# Patient Record
Sex: FEMALE | Race: WHITE | NOT HISPANIC OR LATINO | Employment: UNEMPLOYED | URBAN - METROPOLITAN AREA
[De-identification: names, ages, dates, MRNs, and addresses within clinical notes are randomized per-mention and may not be internally consistent; named-entity substitution may affect disease eponyms.]

---

## 2017-01-27 ENCOUNTER — CONVERSION ENCOUNTER (OUTPATIENT)
Dept: MAMMOGRAPHY | Facility: HOSPITAL | Age: 47
End: 2017-01-27

## 2018-06-06 ENCOUNTER — OFFICE VISIT CONVERTED (OUTPATIENT)
Dept: FAMILY MEDICINE CLINIC | Facility: CLINIC | Age: 48
End: 2018-06-06
Attending: NURSE PRACTITIONER

## 2018-10-17 ENCOUNTER — OFFICE VISIT CONVERTED (OUTPATIENT)
Dept: FAMILY MEDICINE CLINIC | Facility: CLINIC | Age: 48
End: 2018-10-17
Attending: NURSE PRACTITIONER

## 2018-10-24 ENCOUNTER — CONVERSION ENCOUNTER (OUTPATIENT)
Dept: GENERAL RADIOLOGY | Facility: HOSPITAL | Age: 48
End: 2018-10-24

## 2018-10-24 ENCOUNTER — OFFICE VISIT CONVERTED (OUTPATIENT)
Dept: FAMILY MEDICINE CLINIC | Facility: CLINIC | Age: 48
End: 2018-10-24
Attending: NURSE PRACTITIONER

## 2018-10-24 ENCOUNTER — OFFICE VISIT CONVERTED (OUTPATIENT)
Dept: UROLOGY | Facility: CLINIC | Age: 48
End: 2018-10-24
Attending: UROLOGY

## 2018-11-07 ENCOUNTER — OFFICE VISIT CONVERTED (OUTPATIENT)
Dept: FAMILY MEDICINE CLINIC | Facility: CLINIC | Age: 48
End: 2018-11-07
Attending: NURSE PRACTITIONER

## 2018-12-04 ENCOUNTER — OFFICE VISIT CONVERTED (OUTPATIENT)
Dept: FAMILY MEDICINE CLINIC | Facility: CLINIC | Age: 48
End: 2018-12-04
Attending: NURSE PRACTITIONER

## 2018-12-10 ENCOUNTER — CONVERSION ENCOUNTER (OUTPATIENT)
Dept: ULTRASOUND IMAGING | Facility: HOSPITAL | Age: 48
End: 2018-12-10

## 2019-01-03 ENCOUNTER — CONVERSION ENCOUNTER (OUTPATIENT)
Dept: SURGERY | Facility: CLINIC | Age: 49
End: 2019-01-03

## 2019-01-03 ENCOUNTER — OFFICE VISIT CONVERTED (OUTPATIENT)
Dept: UROLOGY | Facility: CLINIC | Age: 49
End: 2019-01-03
Attending: UROLOGY

## 2019-01-04 ENCOUNTER — HOSPITAL ENCOUNTER (OUTPATIENT)
Dept: FAMILY MEDICINE CLINIC | Facility: CLINIC | Age: 49
Discharge: HOME | End: 2019-01-04
Attending: NURSE PRACTITIONER

## 2019-01-04 ENCOUNTER — OFFICE VISIT CONVERTED (OUTPATIENT)
Dept: FAMILY MEDICINE CLINIC | Facility: CLINIC | Age: 49
End: 2019-01-04
Attending: NURSE PRACTITIONER

## 2019-01-04 LAB
ALBUMIN SERPL-MCNC: 4.2 G/DL (ref 3.5–5)
ALBUMIN/GLOB SERPL: 1.4 {RATIO} (ref 1.4–2.6)
ALP SERPL-CCNC: 125 U/L (ref 42–98)
ALT SERPL-CCNC: 31 U/L (ref 10–40)
ANION GAP SERPL CALC-SCNC: 19 MMOL/L (ref 8–19)
AST SERPL-CCNC: 27 U/L (ref 15–50)
BILIRUB SERPL-MCNC: 0.26 MG/DL (ref 0.2–1.3)
BUN SERPL-MCNC: 13 MG/DL (ref 5–25)
BUN/CREAT SERPL: 21 {RATIO} (ref 6–20)
CALCIUM SERPL-MCNC: 9 MG/DL (ref 8.7–10.4)
CHLORIDE SERPL-SCNC: 101 MMOL/L (ref 99–111)
CHOLEST SERPL-MCNC: 222 MG/DL (ref 107–200)
CHOLEST/HDLC SERPL: 6.3 {RATIO} (ref 3–6)
CONV CO2: 25 MMOL/L (ref 22–32)
CONV TOTAL PROTEIN: 7.2 G/DL (ref 6.3–8.2)
CREAT UR-MCNC: 0.63 MG/DL (ref 0.5–0.9)
GFR SERPLBLD BASED ON 1.73 SQ M-ARVRAT: >60 ML/MIN/{1.73_M2}
GLOBULIN UR ELPH-MCNC: 3 G/DL (ref 2–3.5)
GLUCOSE SERPL-MCNC: 122 MG/DL (ref 65–99)
HDLC SERPL-MCNC: 35 MG/DL (ref 40–60)
LDLC SERPL CALC-MCNC: 114 MG/DL (ref 70–100)
OSMOLALITY SERPL CALC.SUM OF ELEC: 293 MOSM/KG (ref 273–304)
POTASSIUM SERPL-SCNC: 3.9 MMOL/L (ref 3.5–5.3)
SODIUM SERPL-SCNC: 141 MMOL/L (ref 135–147)
T4 FREE SERPL-MCNC: 1.1 NG/DL (ref 0.9–1.8)
TRIGL SERPL-MCNC: 364 MG/DL (ref 40–150)
TSH SERPL-ACNC: 2.85 M[IU]/L (ref 0.27–4.2)
VLDLC SERPL-MCNC: 73 MG/DL (ref 5–37)

## 2019-01-14 ENCOUNTER — HOSPITAL ENCOUNTER (OUTPATIENT)
Dept: SLEEP MEDICINE | Facility: HOSPITAL | Age: 49
Discharge: HOME | End: 2019-01-14
Attending: INTERNAL MEDICINE

## 2019-01-18 ENCOUNTER — HOSPITAL ENCOUNTER (OUTPATIENT)
Dept: FAMILY MEDICINE CLINIC | Facility: CLINIC | Age: 49
Discharge: HOME | End: 2019-01-18
Attending: NURSE PRACTITIONER

## 2019-01-18 LAB
ALBUMIN SERPL-MCNC: 4.1 G/DL (ref 3.5–5)
ALBUMIN/GLOB SERPL: 1.2 {RATIO} (ref 1.4–2.6)
ALP SERPL-CCNC: 116 U/L (ref 42–98)
ALT SERPL-CCNC: 42 U/L (ref 10–40)
ANION GAP SERPL CALC-SCNC: 18 MMOL/L (ref 8–19)
AST SERPL-CCNC: 42 U/L (ref 15–50)
BILIRUB SERPL-MCNC: 0.31 MG/DL (ref 0.2–1.3)
BUN SERPL-MCNC: 13 MG/DL (ref 5–25)
BUN/CREAT SERPL: 20 {RATIO} (ref 6–20)
CALCIUM SERPL-MCNC: 9.2 MG/DL (ref 8.7–10.4)
CHLORIDE SERPL-SCNC: 99 MMOL/L (ref 99–111)
CONV CO2: 27 MMOL/L (ref 22–32)
CONV TOTAL PROTEIN: 7.6 G/DL (ref 6.3–8.2)
CREAT UR-MCNC: 0.66 MG/DL (ref 0.5–0.9)
GFR SERPLBLD BASED ON 1.73 SQ M-ARVRAT: >60 ML/MIN/{1.73_M2}
GLOBULIN UR ELPH-MCNC: 3.5 G/DL (ref 2–3.5)
GLUCOSE SERPL-MCNC: 143 MG/DL (ref 65–99)
OSMOLALITY SERPL CALC.SUM OF ELEC: 293 MOSM/KG (ref 273–304)
POTASSIUM SERPL-SCNC: 3.8 MMOL/L (ref 3.5–5.3)
SODIUM SERPL-SCNC: 140 MMOL/L (ref 135–147)

## 2019-01-20 LAB — BACTERIA UR CULT: NORMAL

## 2019-01-29 ENCOUNTER — HOSPITAL ENCOUNTER (OUTPATIENT)
Dept: SLEEP MEDICINE | Facility: HOSPITAL | Age: 49
Discharge: HOME | End: 2019-01-29
Attending: INTERNAL MEDICINE

## 2019-02-01 ENCOUNTER — PROCEDURE VISIT CONVERTED (OUTPATIENT)
Dept: UROLOGY | Facility: CLINIC | Age: 49
End: 2019-02-01
Attending: UROLOGY

## 2019-02-05 ENCOUNTER — CONVERSION ENCOUNTER (OUTPATIENT)
Dept: CARDIOLOGY | Facility: CLINIC | Age: 49
End: 2019-02-05

## 2019-02-05 ENCOUNTER — OFFICE VISIT CONVERTED (OUTPATIENT)
Dept: CARDIOLOGY | Facility: CLINIC | Age: 49
End: 2019-02-05
Attending: SPECIALIST

## 2019-02-20 ENCOUNTER — CONVERSION ENCOUNTER (OUTPATIENT)
Dept: SURGERY | Facility: CLINIC | Age: 49
End: 2019-02-20

## 2019-02-20 ENCOUNTER — OFFICE VISIT CONVERTED (OUTPATIENT)
Dept: UROLOGY | Facility: CLINIC | Age: 49
End: 2019-02-20
Attending: UROLOGY

## 2019-02-28 ENCOUNTER — OFFICE VISIT CONVERTED (OUTPATIENT)
Dept: CARDIOLOGY | Facility: CLINIC | Age: 49
End: 2019-02-28
Attending: SPECIALIST

## 2019-05-14 ENCOUNTER — HOSPITAL ENCOUNTER (OUTPATIENT)
Dept: SLEEP MEDICINE | Facility: HOSPITAL | Age: 49
Discharge: HOME | End: 2019-05-14
Attending: PSYCHIATRY & NEUROLOGY

## 2019-05-24 ENCOUNTER — CONVERSION ENCOUNTER (OUTPATIENT)
Dept: CARDIOLOGY | Facility: CLINIC | Age: 49
End: 2019-05-24
Attending: SPECIALIST

## 2019-08-05 ENCOUNTER — OFFICE VISIT CONVERTED (OUTPATIENT)
Dept: FAMILY MEDICINE CLINIC | Facility: CLINIC | Age: 49
End: 2019-08-05
Attending: NURSE PRACTITIONER

## 2019-08-05 ENCOUNTER — HOSPITAL ENCOUNTER (OUTPATIENT)
Dept: FAMILY MEDICINE CLINIC | Facility: CLINIC | Age: 49
Discharge: HOME | End: 2019-08-05
Attending: NURSE PRACTITIONER

## 2019-08-05 LAB
ALBUMIN SERPL-MCNC: 4.3 G/DL (ref 3.5–5)
ALBUMIN/GLOB SERPL: 1.5 {RATIO} (ref 1.4–2.6)
ALP SERPL-CCNC: 131 U/L (ref 42–98)
ALT SERPL-CCNC: 23 U/L (ref 10–40)
ANION GAP SERPL CALC-SCNC: 18 MMOL/L (ref 8–19)
AST SERPL-CCNC: 18 U/L (ref 15–50)
BASOPHILS # BLD AUTO: 0.04 10*3/UL (ref 0–0.2)
BASOPHILS NFR BLD AUTO: 0.7 % (ref 0–3)
BILIRUB SERPL-MCNC: 0.26 MG/DL (ref 0.2–1.3)
BUN SERPL-MCNC: 12 MG/DL (ref 5–25)
BUN/CREAT SERPL: 20 {RATIO} (ref 6–20)
CALCIUM SERPL-MCNC: 9 MG/DL (ref 8.7–10.4)
CHLORIDE SERPL-SCNC: 102 MMOL/L (ref 99–111)
CONV ABS IMM GRAN: 0.04 10*3/UL (ref 0–0.2)
CONV CO2: 25 MMOL/L (ref 22–32)
CONV IMMATURE GRAN: 0.7 % (ref 0–1.8)
CONV TOTAL PROTEIN: 7.2 G/DL (ref 6.3–8.2)
CREAT UR-MCNC: 0.6 MG/DL (ref 0.5–0.9)
DEPRECATED RDW RBC AUTO: 43.5 FL (ref 36.4–46.3)
EOSINOPHIL # BLD AUTO: 0.13 10*3/UL (ref 0–0.7)
EOSINOPHIL # BLD AUTO: 2.3 % (ref 0–7)
ERYTHROCYTE [DISTWIDTH] IN BLOOD BY AUTOMATED COUNT: 14.3 % (ref 11.7–14.4)
FOLATE SERPL-MCNC: 9.6 NG/ML (ref 4.8–20)
GFR SERPLBLD BASED ON 1.73 SQ M-ARVRAT: >60 ML/MIN/{1.73_M2}
GLOBULIN UR ELPH-MCNC: 2.9 G/DL (ref 2–3.5)
GLUCOSE SERPL-MCNC: 134 MG/DL (ref 65–99)
HCT VFR BLD AUTO: 42.2 % (ref 37–47)
HGB BLD-MCNC: 13.8 G/DL (ref 12–16)
IRON SATN MFR SERPL: 14 % (ref 20–55)
IRON SERPL-MCNC: 54 UG/DL (ref 60–170)
LYMPHOCYTES # BLD AUTO: 1.52 10*3/UL (ref 1–5)
LYMPHOCYTES NFR BLD AUTO: 27.3 % (ref 20–45)
MCH RBC QN AUTO: 27.7 PG (ref 27–31)
MCHC RBC AUTO-ENTMCNC: 32.7 G/DL (ref 33–37)
MCV RBC AUTO: 84.7 FL (ref 81–99)
MONOCYTES # BLD AUTO: 0.85 10*3/UL (ref 0.2–1.2)
MONOCYTES NFR BLD AUTO: 15.3 % (ref 3–10)
NEUTROPHILS # BLD AUTO: 2.98 10*3/UL (ref 2–8)
NEUTROPHILS NFR BLD AUTO: 53.7 % (ref 30–85)
NRBC CBCN: 0 % (ref 0–0.7)
OSMOLALITY SERPL CALC.SUM OF ELEC: 294 MOSM/KG (ref 273–304)
PLATELET # BLD AUTO: 244 10*3/UL (ref 130–400)
PMV BLD AUTO: 9.9 FL (ref 9.4–12.3)
POTASSIUM SERPL-SCNC: 3.8 MMOL/L (ref 3.5–5.3)
RBC # BLD AUTO: 4.98 10*6/UL (ref 4.2–5.4)
SODIUM SERPL-SCNC: 141 MMOL/L (ref 135–147)
T4 FREE SERPL-MCNC: 0.9 NG/DL (ref 0.9–1.8)
TIBC SERPL-MCNC: 386 UG/DL (ref 245–450)
TRANSFERRIN SERPL-MCNC: 270 MG/DL (ref 250–380)
TSH SERPL-ACNC: 4.45 M[IU]/L (ref 0.27–4.2)
VIT B12 SERPL-MCNC: 489 PG/ML (ref 211–911)
WBC # BLD AUTO: 5.56 10*3/UL (ref 4.8–10.8)

## 2019-08-28 ENCOUNTER — OFFICE VISIT CONVERTED (OUTPATIENT)
Dept: SURGERY | Facility: CLINIC | Age: 49
End: 2019-08-28
Attending: NURSE PRACTITIONER

## 2019-10-21 ENCOUNTER — HOSPITAL ENCOUNTER (OUTPATIENT)
Dept: GASTROENTEROLOGY | Facility: HOSPITAL | Age: 49
Setting detail: HOSPITAL OUTPATIENT SURGERY
Discharge: HOME | End: 2019-10-21
Attending: SURGERY

## 2019-11-04 ENCOUNTER — OFFICE VISIT CONVERTED (OUTPATIENT)
Dept: SURGERY | Facility: CLINIC | Age: 49
End: 2019-11-04
Attending: NURSE PRACTITIONER

## 2019-11-08 ENCOUNTER — PROCEDURE VISIT CONVERTED (OUTPATIENT)
Dept: UROLOGY | Facility: CLINIC | Age: 49
End: 2019-11-08
Attending: UROLOGY

## 2019-11-21 ENCOUNTER — OFFICE VISIT CONVERTED (OUTPATIENT)
Dept: FAMILY MEDICINE CLINIC | Facility: CLINIC | Age: 49
End: 2019-11-21
Attending: NURSE PRACTITIONER

## 2019-11-21 ENCOUNTER — HOSPITAL ENCOUNTER (OUTPATIENT)
Dept: FAMILY MEDICINE CLINIC | Facility: CLINIC | Age: 49
Discharge: HOME | End: 2019-11-21
Attending: NURSE PRACTITIONER

## 2019-11-21 LAB
25(OH)D3 SERPL-MCNC: 38.3 NG/ML (ref 30–100)
ALBUMIN SERPL-MCNC: 4.3 G/DL (ref 3.5–5)
ALBUMIN/GLOB SERPL: 1.4 {RATIO} (ref 1.4–2.6)
ALP SERPL-CCNC: 123 U/L (ref 42–98)
ALT SERPL-CCNC: 26 U/L (ref 10–40)
ANION GAP SERPL CALC-SCNC: 18 MMOL/L (ref 8–19)
AST SERPL-CCNC: 26 U/L (ref 15–50)
BASOPHILS # BLD AUTO: 0.03 10*3/UL (ref 0–0.2)
BASOPHILS NFR BLD AUTO: 0.5 % (ref 0–3)
BILIRUB SERPL-MCNC: 0.39 MG/DL (ref 0.2–1.3)
BUN SERPL-MCNC: 14 MG/DL (ref 5–25)
BUN/CREAT SERPL: 23 {RATIO} (ref 6–20)
CALCIUM SERPL-MCNC: 9.7 MG/DL (ref 8.7–10.4)
CHLORIDE SERPL-SCNC: 99 MMOL/L (ref 99–111)
CHOLEST SERPL-MCNC: 222 MG/DL (ref 107–200)
CHOLEST/HDLC SERPL: 5.6 {RATIO} (ref 3–6)
CONV ABS IMM GRAN: 0.03 10*3/UL (ref 0–0.2)
CONV CO2: 24 MMOL/L (ref 22–32)
CONV IMMATURE GRAN: 0.5 % (ref 0–1.8)
CONV TOTAL PROTEIN: 7.3 G/DL (ref 6.3–8.2)
CREAT UR-MCNC: 0.6 MG/DL (ref 0.5–0.9)
DEPRECATED RDW RBC AUTO: 44.2 FL (ref 36.4–46.3)
EOSINOPHIL # BLD AUTO: 0.16 10*3/UL (ref 0–0.7)
EOSINOPHIL # BLD AUTO: 2.9 % (ref 0–7)
ERYTHROCYTE [DISTWIDTH] IN BLOOD BY AUTOMATED COUNT: 14.3 % (ref 11.7–14.4)
EST. AVERAGE GLUCOSE BLD GHB EST-MCNC: 157 MG/DL
GFR SERPLBLD BASED ON 1.73 SQ M-ARVRAT: >60 ML/MIN/{1.73_M2}
GLOBULIN UR ELPH-MCNC: 3 G/DL (ref 2–3.5)
GLUCOSE SERPL-MCNC: 122 MG/DL (ref 65–99)
HBA1C MFR BLD: 7.1 % (ref 3.5–5.7)
HCT VFR BLD AUTO: 42.3 % (ref 37–47)
HDLC SERPL-MCNC: 40 MG/DL (ref 40–60)
HGB BLD-MCNC: 13.9 G/DL (ref 12–16)
LDLC SERPL CALC-MCNC: 144 MG/DL (ref 70–100)
LYMPHOCYTES # BLD AUTO: 2.14 10*3/UL (ref 1–5)
LYMPHOCYTES NFR BLD AUTO: 38.1 % (ref 20–45)
MCH RBC QN AUTO: 28 PG (ref 27–31)
MCHC RBC AUTO-ENTMCNC: 32.9 G/DL (ref 33–37)
MCV RBC AUTO: 85.1 FL (ref 81–99)
MONOCYTES # BLD AUTO: 0.57 10*3/UL (ref 0.2–1.2)
MONOCYTES NFR BLD AUTO: 10.2 % (ref 3–10)
NEUTROPHILS # BLD AUTO: 2.68 10*3/UL (ref 2–8)
NEUTROPHILS NFR BLD AUTO: 47.8 % (ref 30–85)
NRBC CBCN: 0 % (ref 0–0.7)
OSMOLALITY SERPL CALC.SUM OF ELEC: 288 MOSM/KG (ref 273–304)
PLATELET # BLD AUTO: 260 10*3/UL (ref 130–400)
PMV BLD AUTO: 10 FL (ref 9.4–12.3)
POTASSIUM SERPL-SCNC: 3.4 MMOL/L (ref 3.5–5.3)
RBC # BLD AUTO: 4.97 10*6/UL (ref 4.2–5.4)
SODIUM SERPL-SCNC: 138 MMOL/L (ref 135–147)
T4 FREE SERPL-MCNC: 1.2 NG/DL (ref 0.9–1.8)
TRIGL SERPL-MCNC: 190 MG/DL (ref 40–150)
TSH SERPL-ACNC: 1.85 M[IU]/L (ref 0.27–4.2)
VLDLC SERPL-MCNC: 38 MG/DL (ref 5–37)
WBC # BLD AUTO: 5.61 10*3/UL (ref 4.8–10.8)

## 2019-11-23 LAB — CONV ANTI MICROSOMAL AB: 12 IU/ML (ref 0–34)

## 2020-01-29 ENCOUNTER — HOSPITAL ENCOUNTER (OUTPATIENT)
Dept: GENERAL RADIOLOGY | Facility: HOSPITAL | Age: 50
Discharge: HOME | End: 2020-01-29
Attending: NURSE PRACTITIONER

## 2020-02-25 ENCOUNTER — CONVERSION ENCOUNTER (OUTPATIENT)
Dept: FAMILY MEDICINE CLINIC | Facility: CLINIC | Age: 50
End: 2020-02-25

## 2020-02-25 ENCOUNTER — OFFICE VISIT CONVERTED (OUTPATIENT)
Dept: FAMILY MEDICINE CLINIC | Facility: CLINIC | Age: 50
End: 2020-02-25
Attending: NURSE PRACTITIONER

## 2020-04-13 ENCOUNTER — TELEMEDICINE CONVERTED (OUTPATIENT)
Dept: PLASTIC SURGERY | Facility: CLINIC | Age: 50
End: 2020-04-13
Attending: PLASTIC SURGERY

## 2020-06-08 ENCOUNTER — TELEMEDICINE CONVERTED (OUTPATIENT)
Dept: FAMILY MEDICINE CLINIC | Facility: CLINIC | Age: 50
End: 2020-06-08
Attending: NURSE PRACTITIONER

## 2020-06-16 ENCOUNTER — HOSPITAL ENCOUNTER (OUTPATIENT)
Dept: FAMILY MEDICINE CLINIC | Facility: CLINIC | Age: 50
Discharge: HOME | End: 2020-06-16
Attending: NURSE PRACTITIONER

## 2020-06-16 ENCOUNTER — CONVERSION ENCOUNTER (OUTPATIENT)
Dept: FAMILY MEDICINE CLINIC | Facility: CLINIC | Age: 50
End: 2020-06-16

## 2020-06-16 ENCOUNTER — OFFICE VISIT CONVERTED (OUTPATIENT)
Dept: FAMILY MEDICINE CLINIC | Facility: CLINIC | Age: 50
End: 2020-06-16
Attending: NURSE PRACTITIONER

## 2020-06-16 LAB
25(OH)D3 SERPL-MCNC: 38.5 NG/ML (ref 30–100)
ALBUMIN SERPL-MCNC: 4.3 G/DL (ref 3.5–5)
ALBUMIN/GLOB SERPL: 1.3 {RATIO} (ref 1.4–2.6)
ALP SERPL-CCNC: 112 U/L (ref 42–98)
ALT SERPL-CCNC: 34 U/L (ref 10–40)
ANION GAP SERPL CALC-SCNC: 18 MMOL/L (ref 8–19)
AST SERPL-CCNC: 33 U/L (ref 15–50)
BASOPHILS # BLD AUTO: 0.06 10*3/UL (ref 0–0.2)
BASOPHILS NFR BLD AUTO: 0.9 % (ref 0–3)
BILIRUB SERPL-MCNC: 0.35 MG/DL (ref 0.2–1.3)
BUN SERPL-MCNC: 14 MG/DL (ref 5–25)
BUN/CREAT SERPL: 20 {RATIO} (ref 6–20)
CALCIUM SERPL-MCNC: 9.3 MG/DL (ref 8.7–10.4)
CHLORIDE SERPL-SCNC: 102 MMOL/L (ref 99–111)
CHOLEST SERPL-MCNC: 250 MG/DL (ref 107–200)
CHOLEST/HDLC SERPL: 6.8 {RATIO} (ref 3–6)
CONV ABS IMM GRAN: 0.03 10*3/UL (ref 0–0.2)
CONV CO2: 23 MMOL/L (ref 22–32)
CONV IMMATURE GRAN: 0.5 % (ref 0–1.8)
CONV TOTAL PROTEIN: 7.6 G/DL (ref 6.3–8.2)
CREAT UR-MCNC: 0.71 MG/DL (ref 0.5–0.9)
DEPRECATED RDW RBC AUTO: 44.9 FL (ref 36.4–46.3)
EOSINOPHIL # BLD AUTO: 0.16 10*3/UL (ref 0–0.7)
EOSINOPHIL # BLD AUTO: 2.4 % (ref 0–7)
ERYTHROCYTE [DISTWIDTH] IN BLOOD BY AUTOMATED COUNT: 14.1 % (ref 11.7–14.4)
EST. AVERAGE GLUCOSE BLD GHB EST-MCNC: 183 MG/DL
FOLATE SERPL-MCNC: 15.8 NG/ML (ref 4.8–20)
GFR SERPLBLD BASED ON 1.73 SQ M-ARVRAT: >60 ML/MIN/{1.73_M2}
GLOBULIN UR ELPH-MCNC: 3.3 G/DL (ref 2–3.5)
GLUCOSE SERPL-MCNC: 134 MG/DL (ref 65–99)
HBA1C MFR BLD: 8 % (ref 3.5–5.7)
HCT VFR BLD AUTO: 46.6 % (ref 37–47)
HDLC SERPL-MCNC: 37 MG/DL (ref 40–60)
HGB BLD-MCNC: 14.9 G/DL (ref 12–16)
IRON SATN MFR SERPL: 15 % (ref 20–55)
IRON SERPL-MCNC: 67 UG/DL (ref 60–170)
LDLC SERPL CALC-MCNC: 161 MG/DL (ref 70–100)
LYMPHOCYTES # BLD AUTO: 2.36 10*3/UL (ref 1–5)
LYMPHOCYTES NFR BLD AUTO: 35.6 % (ref 20–45)
MCH RBC QN AUTO: 27.8 PG (ref 27–31)
MCHC RBC AUTO-ENTMCNC: 32 G/DL (ref 33–37)
MCV RBC AUTO: 86.9 FL (ref 81–99)
MONOCYTES # BLD AUTO: 0.65 10*3/UL (ref 0.2–1.2)
MONOCYTES NFR BLD AUTO: 9.8 % (ref 3–10)
NEUTROPHILS # BLD AUTO: 3.37 10*3/UL (ref 2–8)
NEUTROPHILS NFR BLD AUTO: 50.8 % (ref 30–85)
NRBC CBCN: 0 % (ref 0–0.7)
OSMOLALITY SERPL CALC.SUM OF ELEC: 290 MOSM/KG (ref 273–304)
PLATELET # BLD AUTO: 262 10*3/UL (ref 130–400)
PMV BLD AUTO: 10.8 FL (ref 9.4–12.3)
POTASSIUM SERPL-SCNC: 3.7 MMOL/L (ref 3.5–5.3)
RBC # BLD AUTO: 5.36 10*6/UL (ref 4.2–5.4)
SODIUM SERPL-SCNC: 139 MMOL/L (ref 135–147)
TIBC SERPL-MCNC: 449 UG/DL (ref 245–450)
TRANSFERRIN SERPL-MCNC: 314 MG/DL (ref 250–380)
TRIGL SERPL-MCNC: 261 MG/DL (ref 40–150)
TSH SERPL-ACNC: 2.79 M[IU]/L (ref 0.27–4.2)
VIT B12 SERPL-MCNC: 1124 PG/ML (ref 211–911)
VLDLC SERPL-MCNC: 52 MG/DL (ref 5–37)
WBC # BLD AUTO: 6.63 10*3/UL (ref 4.8–10.8)

## 2020-06-18 LAB — BACTERIA UR CULT: NORMAL

## 2020-12-03 ENCOUNTER — CONVERSION ENCOUNTER (OUTPATIENT)
Dept: FAMILY MEDICINE CLINIC | Facility: CLINIC | Age: 50
End: 2020-12-03

## 2020-12-03 ENCOUNTER — TELEMEDICINE CONVERTED (OUTPATIENT)
Dept: FAMILY MEDICINE CLINIC | Facility: CLINIC | Age: 50
End: 2020-12-03
Attending: NURSE PRACTITIONER

## 2020-12-14 ENCOUNTER — HOSPITAL ENCOUNTER (OUTPATIENT)
Dept: OTHER | Facility: HOSPITAL | Age: 50
Discharge: HOME | End: 2020-12-14
Attending: NURSE PRACTITIONER

## 2020-12-14 LAB
25(OH)D3 SERPL-MCNC: 32.5 NG/ML (ref 30–100)
ALBUMIN SERPL-MCNC: 4.3 G/DL (ref 3.5–5)
ALBUMIN/GLOB SERPL: 1.3 {RATIO} (ref 1.4–2.6)
ALP SERPL-CCNC: 107 U/L (ref 53–141)
ALT SERPL-CCNC: 33 U/L (ref 10–40)
ANION GAP SERPL CALC-SCNC: 16 MMOL/L (ref 8–19)
AST SERPL-CCNC: 21 U/L (ref 15–50)
BASOPHILS # BLD AUTO: 0.07 10*3/UL (ref 0–0.2)
BASOPHILS NFR BLD AUTO: 0.8 % (ref 0–3)
BILIRUB SERPL-MCNC: 0.29 MG/DL (ref 0.2–1.3)
BUN SERPL-MCNC: 13 MG/DL (ref 5–25)
BUN/CREAT SERPL: 19 {RATIO} (ref 6–20)
CALCIUM SERPL-MCNC: 9.5 MG/DL (ref 8.7–10.4)
CHLORIDE SERPL-SCNC: 100 MMOL/L (ref 99–111)
CHOLEST SERPL-MCNC: 241 MG/DL (ref 107–200)
CHOLEST/HDLC SERPL: 5.7 {RATIO} (ref 3–6)
CONV ABS IMM GRAN: 0.06 10*3/UL (ref 0–0.2)
CONV CO2: 27 MMOL/L (ref 22–32)
CONV CREATININE URINE, RANDOM: 150.2 MG/DL (ref 10–300)
CONV IMMATURE GRAN: 0.7 % (ref 0–1.8)
CONV MICROALBUM.,U,RANDOM: 22.8 MG/L (ref 0–20)
CONV TOTAL PROTEIN: 7.6 G/DL (ref 6.3–8.2)
CREAT UR-MCNC: 0.69 MG/DL (ref 0.5–0.9)
DEPRECATED RDW RBC AUTO: 43 FL (ref 36.4–46.3)
EOSINOPHIL # BLD AUTO: 0.18 10*3/UL (ref 0–0.7)
EOSINOPHIL # BLD AUTO: 2 % (ref 0–7)
ERYTHROCYTE [DISTWIDTH] IN BLOOD BY AUTOMATED COUNT: 13.5 % (ref 11.7–14.4)
EST. AVERAGE GLUCOSE BLD GHB EST-MCNC: 143 MG/DL
FOLATE SERPL-MCNC: 12.7 NG/ML (ref 4.8–20)
GFR SERPLBLD BASED ON 1.73 SQ M-ARVRAT: >60 ML/MIN/{1.73_M2}
GLOBULIN UR ELPH-MCNC: 3.3 G/DL (ref 2–3.5)
GLUCOSE SERPL-MCNC: 120 MG/DL (ref 65–99)
HBA1C MFR BLD: 6.6 % (ref 3.5–5.7)
HCT VFR BLD AUTO: 45.9 % (ref 37–47)
HDLC SERPL-MCNC: 42 MG/DL (ref 40–60)
HGB BLD-MCNC: 15.1 G/DL (ref 12–16)
IRON SATN MFR SERPL: 16 % (ref 20–55)
IRON SERPL-MCNC: 69 UG/DL (ref 60–170)
LDLC SERPL CALC-MCNC: 132 MG/DL (ref 70–100)
LYMPHOCYTES # BLD AUTO: 3.09 10*3/UL (ref 1–5)
LYMPHOCYTES NFR BLD AUTO: 35.2 % (ref 20–45)
MCH RBC QN AUTO: 28.4 PG (ref 27–31)
MCHC RBC AUTO-ENTMCNC: 32.9 G/DL (ref 33–37)
MCV RBC AUTO: 86.3 FL (ref 81–99)
MICROALBUMIN/CREAT UR: 15.2 MG/G{CRE} (ref 0–35)
MONOCYTES # BLD AUTO: 0.74 10*3/UL (ref 0.2–1.2)
MONOCYTES NFR BLD AUTO: 8.4 % (ref 3–10)
NEUTROPHILS # BLD AUTO: 4.65 10*3/UL (ref 2–8)
NEUTROPHILS NFR BLD AUTO: 52.9 % (ref 30–85)
NRBC CBCN: 0 % (ref 0–0.7)
OSMOLALITY SERPL CALC.SUM OF ELEC: 289 MOSM/KG (ref 273–304)
PLATELET # BLD AUTO: 310 10*3/UL (ref 130–400)
PMV BLD AUTO: 9.9 FL (ref 9.4–12.3)
POTASSIUM SERPL-SCNC: 3.9 MMOL/L (ref 3.5–5.3)
RBC # BLD AUTO: 5.32 10*6/UL (ref 4.2–5.4)
SODIUM SERPL-SCNC: 139 MMOL/L (ref 135–147)
TIBC SERPL-MCNC: 436 UG/DL (ref 245–450)
TRANSFERRIN SERPL-MCNC: 305 MG/DL (ref 250–380)
TRIGL SERPL-MCNC: 333 MG/DL (ref 40–150)
TSH SERPL-ACNC: 2.28 M[IU]/L (ref 0.27–4.2)
VIT B12 SERPL-MCNC: 522 PG/ML (ref 211–911)
VLDLC SERPL-MCNC: 67 MG/DL (ref 5–37)
WBC # BLD AUTO: 8.79 10*3/UL (ref 4.8–10.8)

## 2021-04-15 ENCOUNTER — HOSPITAL ENCOUNTER (OUTPATIENT)
Dept: FAMILY MEDICINE CLINIC | Facility: CLINIC | Age: 51
Discharge: HOME | End: 2021-04-15
Attending: NURSE PRACTITIONER

## 2021-04-15 ENCOUNTER — OFFICE VISIT CONVERTED (OUTPATIENT)
Dept: FAMILY MEDICINE CLINIC | Facility: CLINIC | Age: 51
End: 2021-04-15
Attending: NURSE PRACTITIONER

## 2021-04-15 LAB
25(OH)D3 SERPL-MCNC: 30.5 NG/ML (ref 30–100)
ALBUMIN SERPL-MCNC: 4.3 G/DL (ref 3.5–5)
ALBUMIN/GLOB SERPL: 1.3 {RATIO} (ref 1.4–2.6)
ALP SERPL-CCNC: 89 U/L (ref 53–141)
ALT SERPL-CCNC: 33 U/L (ref 10–40)
ANION GAP SERPL CALC-SCNC: 21 MMOL/L (ref 8–19)
AST SERPL-CCNC: 31 U/L (ref 15–50)
BASOPHILS # BLD AUTO: 0.07 10*3/UL (ref 0–0.2)
BASOPHILS NFR BLD AUTO: 1 % (ref 0–3)
BILIRUB SERPL-MCNC: 0.29 MG/DL (ref 0.2–1.3)
BUN SERPL-MCNC: 13 MG/DL (ref 5–25)
BUN/CREAT SERPL: 18 {RATIO} (ref 6–20)
CALCIUM SERPL-MCNC: 9.3 MG/DL (ref 8.7–10.4)
CHLORIDE SERPL-SCNC: 99 MMOL/L (ref 99–111)
CHOLEST SERPL-MCNC: 251 MG/DL (ref 107–200)
CHOLEST/HDLC SERPL: 6.3 {RATIO} (ref 3–6)
CONV ABS IMM GRAN: 0.03 10*3/UL (ref 0–0.2)
CONV CO2: 24 MMOL/L (ref 22–32)
CONV IMMATURE GRAN: 0.4 % (ref 0–1.8)
CONV TOTAL PROTEIN: 7.6 G/DL (ref 6.3–8.2)
CREAT UR-MCNC: 0.72 MG/DL (ref 0.5–0.9)
DEPRECATED RDW RBC AUTO: 45 FL (ref 36.4–46.3)
EOSINOPHIL # BLD AUTO: 0.14 10*3/UL (ref 0–0.7)
EOSINOPHIL # BLD AUTO: 2 % (ref 0–7)
ERYTHROCYTE [DISTWIDTH] IN BLOOD BY AUTOMATED COUNT: 14 % (ref 11.7–14.4)
EST. AVERAGE GLUCOSE BLD GHB EST-MCNC: 148 MG/DL
FOLATE SERPL-MCNC: 17.9 NG/ML (ref 4.8–20)
GFR SERPLBLD BASED ON 1.73 SQ M-ARVRAT: >60 ML/MIN/{1.73_M2}
GLOBULIN UR ELPH-MCNC: 3.3 G/DL (ref 2–3.5)
GLUCOSE SERPL-MCNC: 138 MG/DL (ref 65–99)
HBA1C MFR BLD: 6.8 % (ref 3.5–5.7)
HCT VFR BLD AUTO: 46.6 % (ref 37–47)
HDLC SERPL-MCNC: 40 MG/DL (ref 40–60)
HGB BLD-MCNC: 14.8 G/DL (ref 12–16)
IRON SATN MFR SERPL: 14 % (ref 20–55)
IRON SERPL-MCNC: 66 UG/DL (ref 60–170)
LDLC SERPL CALC-MCNC: 162 MG/DL (ref 70–100)
LYMPHOCYTES # BLD AUTO: 2.33 10*3/UL (ref 1–5)
LYMPHOCYTES NFR BLD AUTO: 33.3 % (ref 20–45)
MCH RBC QN AUTO: 27.9 PG (ref 27–31)
MCHC RBC AUTO-ENTMCNC: 31.8 G/DL (ref 33–37)
MCV RBC AUTO: 87.8 FL (ref 81–99)
MONOCYTES # BLD AUTO: 0.6 10*3/UL (ref 0.2–1.2)
MONOCYTES NFR BLD AUTO: 8.6 % (ref 3–10)
NEUTROPHILS # BLD AUTO: 3.83 10*3/UL (ref 2–8)
NEUTROPHILS NFR BLD AUTO: 54.7 % (ref 30–85)
NRBC CBCN: 0 % (ref 0–0.7)
OSMOLALITY SERPL CALC.SUM OF ELEC: 292 MOSM/KG (ref 273–304)
PLATELET # BLD AUTO: 301 10*3/UL (ref 130–400)
PMV BLD AUTO: 10.1 FL (ref 9.4–12.3)
POTASSIUM SERPL-SCNC: 4 MMOL/L (ref 3.5–5.3)
RBC # BLD AUTO: 5.31 10*6/UL (ref 4.2–5.4)
SODIUM SERPL-SCNC: 140 MMOL/L (ref 135–147)
TIBC SERPL-MCNC: 463 UG/DL (ref 245–450)
TRANSFERRIN SERPL-MCNC: 324 MG/DL (ref 250–380)
TRIGL SERPL-MCNC: 421 MG/DL (ref 40–150)
TSH SERPL-ACNC: 2.35 M[IU]/L (ref 0.27–4.2)
VIT B12 SERPL-MCNC: 464 PG/ML (ref 211–911)
WBC # BLD AUTO: 7 10*3/UL (ref 4.8–10.8)

## 2021-05-07 ENCOUNTER — HOSPITAL ENCOUNTER (OUTPATIENT)
Dept: GENERAL RADIOLOGY | Facility: HOSPITAL | Age: 51
Discharge: HOME | End: 2021-05-07
Attending: NURSE PRACTITIONER

## 2021-05-10 NOTE — H&P
"   History and Physical      Patient Name: Carlota Robin   Patient ID: [id number]   Sex: Female   YOB: 1970    Primary Care Provider: Audra MACIEL   Referring Provider: Ciarra MACIEL    Visit Date: April 13, 2020    Provider: Viktoriya Melissa MD   Location: [facility]   Location Address: [address] [place] [address]  [id number]   Location Phone: [phone]          Chief Complaint  · \"My breasts are too big\"  · \"I'm having shoulder and back pain\"  · \"My bra straps are cutting into my shoulders\"  · \"I have rashes under my breasts\"  · \"I can't exercise because of my breasts\"      History Of Present Illness  Carlota Robin is a 49 year old /White female who presents to the office today as a consult from Ciarra MACIEL.   Carlota Robin is a 49 year old /White female who presents to the office today as a consult from Ciarra MACIEL and would like to discuss breast reduction. Was seen Nov 2017 but  had heart transplant surgery. Current bra size 42 H , would like to be a C cup. Neck, shoulders, and upper back pain present for 20 years. Conservative treatments of chiropractic care and physical therapy , with little or temporary relief. Experiences rashes, treats with hydrocortisone cream and baby powder. Trouble sleeping, cannot get comfortable. Trouble exercising , cannot do ACTIVITIES that she would like to do. Last mammo was 1/29/20 and was benign.   Video Conferencing Visit  Carlota Robin is a 49 year old /White female who is presenting for evaluation via video conferencing. Verbal consent obtained before beginning visit.   The following staff were present during this visit: JON Sexton       Review of Systems  · Cardiovascular  o Denies  o : chest pain, lower extremity edema, Heart Attack, Abnormal EKG  · Respiratory  o Denies  o : shortness of breath, wheezing, " cough  · Neurologic  o Denies  o : muscular weakness, incoordination, tingling or numbness, headaches  · Psychiatric  o Denies  o : anxiety, depression, difficulty sleeping      Physical Examination  · Constitutional  o Appearance  o : well developed, well-nourished, Alert and oriented X3  · Eyes  o Sclerae  o : sclerae white  · Respiratory  o Respiratory Effort  o : breathing unlabored   · Psychiatric  o Judgement and Insight  o : judgment and insight intact  o Mood and Affect  o : mood normal, affect appropriate  · Schnur Scale  o Schnur Scale Score  o : Was 482 on Nov, 2017 visit  · BSA  o BSA  o : 1.86 per Nov. 2017 consult              Assessment  · Macromastia       Hypertrophy of breast     611.1/N62    Problems Reconciled  Plan  · Orders  o Plastics reconstructive visit ([facility]) - - 04/13/2020  · Medications  o Medications have been Reconciled  o Transition of Care or Provider Policy  · Instructions  o We discussed the procedure for bilateral breast reduction under general anesthesia as well as the postoperative recovery time and the need for limitation of activities. The risks of surgery were discussed including bleeding, infection, scarring (for which diagrams were drawn), pain, poor healing, loss of skin and or nipple, change in nipple sensation, inability to breast feed, asymmetry, no guarantee of postoperative cup size.  o Get clearance from PCP and add to list for surgery. Will call with pre-op appointment.            Electronically Signed by: Viktoriya Melissa MD -Author on April 14, 2020 10:51:09 AM

## 2021-05-13 NOTE — PROGRESS NOTES
Progress Note      Patient Name: Carlota Robin   Patient ID: [id number]   Sex: Female   YOB: 1970    Primary Care Provider: Audra MACIEL   Referring Provider: Ciarra MACIEL    Visit Date: June 8, 2020    Provider: RAHEEM Munoz   Location: [facility]   Location Address: [address] [place] [address] MARE Andersen  [id number]   Location Phone: [phone]          Chief Complaint     Med refills  Discuss weight       History Of Present Illness  Video Conferencing Visit  Carlota Robin is a 49 year old /White female who is presenting for evaluation via video conferencing via [employer] . Verbal consent obtained before beginning visit. Patient alone on call   The following staff were present during this visit: AT/CMA   Carlota Robin is a 49 year old /White female who presents for evaluation and treatment of:      HTN- has been running good, she checks it when she is at her moms    DANY- she hasn't been wearing her cpap. It slips off her face.     obesity- she says she has ballooned up recently. No weight gain by our record since feb. She had an apt with Dr Hudson that was canceled and she hasn't rescheduled    fatigue- worse than normal lately    mray d def- on weekly supplements    allergies- on antihistamines and singular, doing ok    rad- she is short of breath, coughing and wheezing, she thinks it's weight related    depression, doing good with Cymbalta and abilify    macromastia, she is needing a preop physical for surgery clearance now    gerd, on protonix       Past Medical History  Disease Name Date Onset Notes   Acne --  --    Allergic rhinitis, chronic --  --    Anxiety --  --    Depression --  --    Diabetes mellitus, type 2 06/08/2020 --    Essential hypertension 06/08/2020 --    Fibromyalgia --  --    GERD --  --    High cholesterol --  --    Hypertension --  --    Inguinal hernia --  --    Insomina --  --    Macromastia --  --     Migraine --  --    Obesity 02/25/2020 --    DANY (obstructive sleep apnea) 06/08/2020 --    Seasonal allergies --  --    Sinus trouble --  --    Stomach pain 2016 --    Stress 2016 --    Thyroid Problems --  --    Urinary incontinence --  --    Vitamin D deficiency 06/08/2020 --          Past Surgical History  Procedure Name Date Notes   Breast biopsy, right breast --  --    Cesarian Section --  --    Cholecystectomy --  --    Colonoscopy --  --    Cysto with Bladder Botox 2-1-19 2/1/19 Cystoscopy, Bladder Botox 100 units with  11/8/19 Cystoscopy, Bladder Botox 100 units with    Endoscopy --  --    Hernia --  --    Hysterectomy 05/2017 --    [place] Tooth Extraction --  --          Medication List  Name Date Started Instructions   Abilify 5 mg oral tablet 06/08/2020 take 1 tablet (5 mg) by oral route once daily for 30 days   amlodipine 10 mg oral tablet 06/08/2020 take 1 tablet (10 mg) by oral route once daily for 30 days   bilateral cock up splints 10/31/2018 wear daily as tolerated   Calcium 600 600 mg calcium (1,500 mg) oral tablet 03/30/2020 take 1 tablet by oral route daily for 30 days   Cymbalta 60 mg oral capsule,delayed release(/EC) 06/08/2020 take 1 capsule (60 mg) by oral route once daily for 30 days   estradiol 0.05 mg/24 hr transdermal patch weekly  apply 1 patch by transdermal route twice week   estradiol 10 mcg vaginal tablet  insert 1 tablet (10 mcg) by vaginal route twice weekly   famotidine 20 mg oral tablet 06/08/2020 take 1 tablet (20 mg) by oral route once daily at bedtime for 30 days   hydrochlorothiazide 12.5 mg oral tablet 06/08/2020 take 1 tablet (12.5 mg) by oral route once daily for 30 days   Iron 65 mg oral  1 QD   melatonin 5 mg oral tablet  take 1 tablet by oral route daily   meloxicam 7.5 mg oral tablet 06/08/2020 take 1 tablet (7.5 mg) by oral route once daily   metformin 500 mg oral tablet 06/08/2020 take 1 tablet by oral route daily for 90 days   ProAir RespiClick  90 mcg/actuation inhalation aerosol powdr breath activated 11/21/2019 inhale 1 - 2 puffs (90 - 180 mcg) by inhalation route every 4-6 hours as needed   Probiotic oral  50 billon daily   Protonix 20 mg oral tablet,delayed release (DR/EC) 06/08/2020 take 1 tablet (20 mg) by oral route once daily for 30 days   Singulair 10 mg oral tablet 06/08/2020 take 1 tablet (10 mg) by oral route once daily in the evening for 30 days   Synthroid 25 mcg oral tablet 06/08/2020 take 1 tablet (25 mcg) by oral route once daily   trazodone 50 mg oral tablet 06/08/2020 take 1 tablet (50 mg) by oral route once daily at bedtime   Vitamin B-12 1,000 mcg oral tablet  take 1 tablet by oral route daily   Vitamin D2 1,250 mcg (50,000 unit) oral capsule 06/08/2020 take 1 capsule by oral route Weekly   vitamin E oral  --    Zyrtec 10 mg oral tablet 06/08/2020 take 1 tablet (10 mg) by oral route once daily         Allergy List  Allergen Name Date Reaction Notes   hydrocodone-acetaminophen --  --  --    lisinopril --  cough --    pravastatin --  nausea --        Allergies Reconciled  Family Medical History  Disease Name Relative/Age Notes   Stroke Mother/   --    Diabetes, unspecified type  Uncle (maternal)   Renal Calculus Father/   Father   Family history of colon cancer Grandfather (paternal)/70s  Grandmother (maternal)/50s   Grandmother (maternal)/50s; Grandfather (paternal)/70s   Family history of breast cancer  Aunt/20s         Social History  Finding Status Start/Stop Quantity Notes   Alcohol Never 0/0 --  drinks no   Assessed for Abuse/Neglect --  --/-- --  Denies abuse   Caffeine Current some day 0/0 --  drinks occasionally; tea; 1-2 times per day    --  --/-- --  --    Other Substance Use Never --/-- --  --    Second hand smoke exposure Never 0/0 --  no   Tobacco Never 0/0 --  never a smoker         Immunizations  NameDate Admin Mfg Trade Name Lot Number Route Inj VIS Given VIS Publication   Eozdbzwnt50/25/2019 NE Not Entered  NE NE  11/25/2019    Comments: [facility]   Pwlzudjsr14/04/2019 NE Not Entered [id number] IM NE 01/04/2019    Comments: [facility]   Tdap11/25/2019 NE Not Entered  NE NE 11/25/2019    Comments: [facility]         Review of Systems  · Constitutional  o Admits  o : fatigue, weight gain  o Denies  o : fever, weight loss  · HENT  o Admits  o : nasal congestion  · Cardiovascular  o Admits  o : lower extremity edema  o Denies  o : claudication, chest pressure, palpitations  · Respiratory  o Admits  o : shortness of breath and wheezing  · Gastrointestinal  o Admits  o : acid reflux, bloating  o Denies  o : nausea, vomiting, diarrhea, constipation  · Psychiatric  o Admits  o : anxiety, depression  o Denies  o : suicidal ideation, homicidal ideation      Vitals  Date Time BP Position Site L\R Cuff Size HR RR TEMP (F) WT  HT  BMI kg/m2 BSA m2 O2 Sat HC       06/08/2020 01:42 /71 Sitting       217lbs 0oz 5'   42.38 2.04           Physical Examination  · Constitutional  o Appearance  o : well developed, well-nourished, no acute distress  · Respiratory  o Respiratory Effort  o : breathing unlabored  o Inspection of Chest  o : chest rise symmetric bilaterally  · Cardiovascular  o Peripheral Vascular System  o :   § Extremities  § : no edema  · Psychiatric  o Mood and Affect  o : mood normal, affect appropriate          Assessment  · Allergic rhinitis due to allergen     477.9/J30.9  · Diabetes mellitus, type 2     250.00/E11.9  · Essential hypertension     401.9/I10  · Hypothyroidism     244.9/E03.9  · Obesity     278.00/E66.9  · Vitamin D deficiency     268.9/E55.9  · Anxiety     300.02/F41.1  · Macromastia     611.1/N62  · Seasonal allergies     477.9/J30.2  · Fibromyalgia     729.1/M79.7  · DANY (obstructive sleep apnea)     327.23/G47.33    Problems Reconciled  Plan  · Orders  o Diabetes 2 Panel (Urine Microalbumin, CMP, Lipid, A1c, ) [facility] (14287, 77245, 82303, 49710) - 250.00/E11.9 - 06/08/2020  o CBC with Auto Diff  [facility] (92784) - - 06/08/2020  o TSH [facility] (25539) - - 06/08/2020  o Vitamin D (25-Hydroxy) Level (59467) - - 06/08/2020  o ACO-39: Current medications updated and reviewed () - - 06/08/2020  o ACO-14: Influenza immunization administered or previously received () - - 06/08/2020  · Medications  o Abilify 5 mg oral tablet   SIG: take 1 tablet (5 mg) by oral route once daily for 30 days   DISP: (30) tablets with 5 refills  Refilled on 06/08/2020     o amlodipine 10 mg oral tablet   SIG: take 1 tablet (10 mg) by oral route once daily for 30 days   DISP: (30) tablet with 5 refills  Refilled on 06/08/2020     o Cymbalta 60 mg oral capsule,delayed release(DR/EC)   SIG: take 1 capsule (60 mg) by oral route once daily for 30 days   DISP: (30) capsules with 5 refills  Refilled on 06/08/2020     o famotidine 20 mg oral tablet   SIG: take 1 tablet (20 mg) by oral route once daily at bedtime for 30 days   DISP: (30) tablets with 5 refills  Refilled on 06/08/2020     o hydrochlorothiazide 12.5 mg oral tablet   SIG: take 1 tablet (12.5 mg) by oral route once daily for 30 days   DISP: (30) tablets with 5 refills  Refilled on 06/08/2020     o meloxicam 7.5 mg oral tablet   SIG: take 1 tablet (7.5 mg) by oral route once daily   DISP: (30) tablets with 5 refills  Refilled on 06/08/2020     o metformin 500 mg oral tablet   SIG: take 1 tablet by oral route daily for 90 days   DISP: (90) tablets with 1 refills  Refilled on 06/08/2020     o Protonix 20 mg oral tablet,delayed release (DR/EC)   SIG: take 1 tablet (20 mg) by oral route once daily for 30 days   DISP: (30) tablets with 5 refills  Refilled on 06/08/2020     o Singulair 10 mg oral tablet   SIG: take 1 tablet (10 mg) by oral route once daily in the evening for 30 days   DISP: (30) tablets with 5 refills  Refilled on 06/08/2020     o Synthroid 25 mcg oral tablet   SIG: take 1 tablet (25 mcg) by oral route once daily   DISP: (30) tablets with 5 refills  Refilled on 06/08/2020      o trazodone 50 mg oral tablet   SIG: take 1 tablet (50 mg) by oral route once daily at bedtime   DISP: (30) tablets with 2 refills  Refilled on 06/08/2020     o Vitamin D2 1,250 mcg (50,000 unit) oral capsule   SIG: take 1 capsule by oral route Weekly   DISP: (13) Unspecified with 2 refills  Refilled on 06/08/2020     o Zyrtec 10 mg oral tablet   SIG: take 1 tablet (10 mg) by oral route once daily   DISP: (30) tablets with 5 refills  Refilled on 06/08/2020     o Medications have been Reconciled  o Transition of Care or Provider Policy  · Instructions  o Take all medications as prescribed/directed.  o Patient was educated/instructed on their diagnosis, treatment and medications prior to discharge from the clinic today.  o she is going to return for a preop physical with labs, ekg and ua. refilled all meds. She is going to call for the consult with dr Hudson  · Disposition  o Call or Return if symptoms worsen or persist.  o F/u appt in 6 months            Electronically Signed by: RAHEEM Munoz -Author on June 8, 2020 02:08:38 PM

## 2021-05-13 NOTE — PROGRESS NOTES
"   Progress Note      Patient Name: Carlota Robin   Patient ID: [id number]   Sex: Female   YOB: 1970    Primary Care Provider: Audra MACIEL   Referring Provider: Ciarra MACIEL    Visit Date: December 3, 2020    Provider: RAHEEM Munoz   Location: [facility]   Location Address: [address] [place] [address]  [id number]   Location Phone: [phone]          Chief Complaint  · L foot pain after sitting  · \"shakiness\" all over 5-6 times a day x 3 mths  · discuss meds      History Of Present Illness  TELEHEALTH TELEPHONE VISIT  Carlota Robin is a 50 year old /White female who is presenting for evaluation via telehealth telephone visit. Verbal consent obtained before beginning visit.   Provider spent 22 minutes with patient during telehealth visit.   The following staff were present during this visit: gm   Past Medical History/Overview of Patient Symptoms  Carlota Robin is a 50 year old /White female who presents for evaluation and treatment of:      heel pain, left foot, hurts worse when she first steps down on it and it feels like the tendons are tight.     weight loss- she is down to 203, she has been doing low carb    shakiness - happens several times a day, she can't correlate to anything. has been going on for about 3 months. She has been doing different diets for the last 3 months, but she thinks it might be one of her meds causing her symptoms    type 2 dm- She is doing low carb, not checking sugars, due for labs, on metformin    htn- BP has been \"good.\" was 130/79 for the phone visit    hlp- controlled with statin    obesity- losing weight with diet    anxiety and depression- she says it's doing really well right now, she is on abilify and cymbalta    arthritis- on mobic    anemia- taking iron, due for labs    insomnia- trazodone adn melatonin, works well    b12 def- taking a supplement    vit d def- taking a " suppleemnt    gerd- controlled with PPI       Past Medical History  Disease Name Date Onset Notes   Acne --  --    Allergic rhinitis, chronic --  --    Anxiety --  --    B12 deficiency 06/16/2020 --    Depression --  --    Diabetes mellitus, type 2 06/08/2020 --    Essential hypertension 06/08/2020 --    Fibromyalgia --  --    GERD --  --    High cholesterol --  --    Hypertension --  --    Hypothyroidism 06/16/2020 --    Inguinal hernia --  --    Insomina --  --    Macromastia --  --    Migraine --  --    Obesity 02/25/2020 --    DANY (obstructive sleep apnea) 06/08/2020 --    Right bundle branch block 06/16/2020 --    Seasonal allergies --  --    Sinus trouble --  --    Stomach pain 2016 --    Stress 2016 --    Thyroid Problems --  --    Urinary Incontinence --  --    Vitamin D deficiency 06/08/2020 --          Past Surgical History  Procedure Name Date Notes   Breast biopsy, right breast --  --    Cesarian Section --  --    Cholecystectomy --  --    Colonoscopy --  --    Cysto with Bladder Botox 2-1-19 2/1/19 Cystoscopy, Bladder Botox 100 units with  11/8/19 Cystoscopy, Bladder Botox 100 units with    Endoscopy --  --    Hernia --  --    Hysterectomy 05/2017 --    [place] Tooth Extraction --  --          Medication List  Name Date Started Instructions   Abilify 5 mg oral tablet 06/08/2020 take 1 tablet (5 mg) by oral route once daily for 30 days   amlodipine 10 mg oral tablet 06/08/2020 take 1 tablet (10 mg) by oral route once daily for 30 days   bilateral cock up splints 10/31/2018 wear daily as tolerated   Calcium 600 600 mg calcium (1,500 mg) oral tablet 03/30/2020 take 1 tablet by oral route daily for 30 days   cimetidine 200 mg oral tablet 06/09/2020 take 1 tablet (200 mg) by oral route 2 times per day 30 minutes before meals   Cymbalta 60 mg oral capsule,delayed release(/EC) 06/08/2020 take 1 capsule (60 mg) by oral route once daily for 30 days   estradiol 0.05 mg/24 hr transdermal patch  weekly  apply 1 patch by transdermal route twice week   famotidine 20 mg oral tablet 06/08/2020 take 1 tablet (20 mg) by oral route once daily at bedtime for 30 days   hydrochlorothiazide 12.5 mg oral tablet 06/08/2020 take 1 tablet (12.5 mg) by oral route once daily for 30 days   Iron 65 mg oral  1 QD   Lipitor 20 mg oral tablet 06/22/2020 take 1 tablet (20 mg) by oral route once daily for 90 days   melatonin 5 mg oral tablet  take 1 tablet by oral route daily   meloxicam 7.5 mg oral tablet 06/08/2020 take 1 tablet (7.5 mg) by oral route once daily   metformin 1,000 mg oral tablet 06/22/2020 take 1 tablet (1,000 mg) by oral route 2 times per day with morning and evening meals for 90 days   ProAir RespiClick 90 mcg/actuation inhalation aerosol powdr breath activated 11/21/2019 inhale 1 - 2 puffs (90 - 180 mcg) by inhalation route every 4-6 hours as needed   Probiotic oral  50 billon daily   Protonix 40 mg oral tablet,delayed release (DR/EC) 06/16/2020 take 1 tablet (40 mg) by oral route once daily for 30 days   Singulair 10 mg oral tablet 06/08/2020 take 1 tablet (10 mg) by oral route once daily in the evening for 30 days   Synthroid 25 mcg oral tablet 06/08/2020 take 1 tablet (25 mcg) by oral route once daily   trazodone 50 mg oral tablet 06/08/2020 take 1 tablet (50 mg) by oral route once daily at bedtime   Vitamin B-12 1,000 mcg oral tablet  take 1 tablet by oral route daily   Vitamin D2 1,250 mcg (50,000 unit) oral capsule 06/08/2020 take 1 capsule by oral route Weekly   vitamin E oral  --    Zyrtec 10 mg oral tablet 06/08/2020 take 1 tablet (10 mg) by oral route once daily         Allergy List  Allergen Name Date Reaction Notes   hydrocodone-acetaminophen --  --  --    lisinopril --  cough --    pravastatin --  nausea --          Family Medical History  Disease Name Relative/Age Notes   Stroke Mother/   --    Diabetes, unspecified type  Uncle (maternal)   Renal Calculus Father/   Father   Family history of  colon cancer Grandfather (paternal)/70s  Grandmother (maternal)/50s   Grandmother (maternal)/50s; Grandfather (paternal)/70s   Family history of breast cancer  Aunt/20s         Social History  Finding Status Start/Stop Quantity Notes   Alcohol Never 0/0 --  drinks no   Assessed for Abuse/Neglect --  --/-- --  Denies abuse   Caffeine Current some day 0/0 --  drinks occasionally; tea; 1-2 times per day    --  --/-- --  --    Other Substance Use Never --/-- --  --    Second hand smoke exposure Never 0/0 --  no   Tobacco Never 0/0 --  never a smoker         Immunizations  NameDate Admin Mfg Trade Name Lot Number Route Inj VIS Given VIS Publication   [id number]/25/2019 NE Not Entered  NE NE 11/25/2019    Comments: [facility]   Tdap11/25/2019 NE Not Entered  NE NE 11/25/2019    Comments: [facility]         Review of Systems  · Constitutional  o Admits  o : fatigue, weight loss  o Denies  o : fever, weight gain  · HENT  o Denies  o : headaches, vertigo, lightheadedness  · Cardiovascular  o Denies  o : lower extremity edema, claudication, chest pressure, palpitations  · Respiratory  o Denies  o : shortness of breath, wheezing, cough, hemoptysis, dyspnea on exertion  · Gastrointestinal  o Denies  o : nausea, vomiting, diarrhea, constipation, abdominal pain  · Musculoskeletal  o Admits  o : foot pain, heel pain  · Psychiatric  o Admits  o : anxiety, depression  o Denies  o : suicidal ideation, homicidal ideation      Vitals  Date Time BP Position Site L\R Cuff Size HR RR TEMP (F) WT  HT  BMI kg/m2 BSA m2 O2 Sat FR L/min FiO2 HC       12/03/2020 11:32 /79 Sitting    79 - R                       Assessment  · Anemia     285.9/D64.9  · Diabetes mellitus, type 2     250.00/E11.9  · Essential hypertension     401.9/I10  · Fatigue     780.79/R53.83  · GERD (gastroesophageal reflux disease)     530.81/K21.9  · Hyperlipidemia     272.4/E78.5  · Hypothyroidism     244.9/E03.9  · Major depressive  disorder     296.20/F32.2  · Obesity     278.00/E66.9  · Osteoarthritis     715.90/M19.90  · Vitamin D deficiency     268.9/E55.9  · B12 deficiency     266.2/E53.8  · DANY (obstructive sleep apnea)     327.23/G47.33  · Allergic rhinitis, chronic     477.9  · Seasonal allergies     477.9/J30.2  · Fibromyalgia     729.1/M79.7  · Plantar fasciitis of left foot     728.71/M72.2  · Shakiness     781.0/R25.1  · Insomnia     780.52/G47.00      Plan  · Orders  o TSH [facility] (89977) - 244.9/E03.9 - 01/14/2021   fax all labs to hospital please  o Hgb A1c [facility] (86398) - 268.9/E55.9 - 12/03/2020  o Physical, Primary Care Panel (CBC, CMP, Lipid, TSH) [facility] (54824, 15122, 52595, 27244) - 401.9/I10, 272.4/E78.5 - 12/03/2020  o Vitamin D (25-Hydroxy) Level (75654) - 268.9/E55.9 - 12/03/2020  o ACO-39: Current medications updated and reviewed (1159F, ) - - 12/03/2020  o Physician Telephone Evaluation, 21-30 minutes (67512) - - 12/03/2020  o Iron Profile (Iron 31319 TIBC 31684 and Transferrin 72277) (IRONP) - 285.9/D64.9 - 12/03/2020  o Urine microalbumin (33407) - 250.00/E11.9 - 12/03/2020  o B12 Folate levels (B12FO) - 266.2/E53.8 - 12/03/2020  · Medications  o Abilify 5 mg oral tablet   SIG: take 1 tablet (5 mg) by oral route once daily for 30 days   DISP: (30) Tablet with 5 refills  Refilled on 12/03/2020     o amlodipine 10 mg oral tablet   SIG: take 1 tablet (10 mg) by oral route once daily for 30 days   DISP: (30) Tablet with 5 refills  Refilled on 12/03/2020     o Calcium 600 600 mg calcium (1,500 mg) oral tablet   SIG: take 1 tablet by oral route daily for 30 days   DISP: (30) Tablet with 5 refills  Refilled on 12/03/2020     o cimetidine 200 mg oral tablet   SIG: take 1 tablet (200 mg) by oral route 2 times per day 30 minutes before meals   DISP: (60) Tablet with 5 refills  Refilled on 12/03/2020     o Cymbalta 60 mg oral capsule,delayed release(DR/EC)   SIG: take 1 capsule (60 mg) by oral route once daily for 30 days   DISP:  (30) Capsule with 5 refills  Refilled on 12/03/2020     o hydrochlorothiazide 12.5 mg oral tablet   SIG: take 1 tablet (12.5 mg) by oral route once daily for 30 days   DISP: (30) Tablet with 5 refills  Refilled on 12/03/2020     o Lipitor 20 mg oral tablet   SIG: take 1 tablet (20 mg) by oral route once daily for 90 days   DISP: (90) Tablet with 1 refills  Refilled on 12/03/2020     o meloxicam 7.5 mg oral tablet   SIG: take 1 tablet (7.5 mg) by oral route once daily   DISP: (30) Tablet with 5 refills  Refilled on 12/03/2020     o metformin 1,000 mg oral tablet   SIG: take 1 tablet (1,000 mg) by oral route 2 times per day with morning and evening meals for 90 days   DISP: (180) Tablet with 0 refills  Refilled on 12/03/2020     o Protonix 40 mg oral tablet,delayed release (DR/EC)   SIG: take 1 tablet (40 mg) by oral route once daily for 30 days   DISP: (30) Tablet with 5 refills  Refilled on 12/03/2020     o Singulair 10 mg oral tablet   SIG: take 1 tablet (10 mg) by oral route once daily in the evening for 30 days   DISP: (30) Tablet with 5 refills  Refilled on 12/03/2020     o Synthroid 25 mcg oral tablet   SIG: take 1 tablet (25 mcg) by oral route once daily   DISP: (30) Tablet with 5 refills  Refilled on 12/03/2020     o trazodone 50 mg oral tablet   SIG: take 1 tablet (50 mg) by oral route once daily at bedtime   DISP: (30) Tablet with 2 refills  Refilled on 12/03/2020     o Vitamin D2 1,250 mcg (50,000 unit) oral capsule   SIG: take 1 capsule by oral route Weekly   DISP: (13) Tablet with 2 refills  Refilled on 12/03/2020     o famotidine 20 mg oral tablet   SIG: take 1 tablet (20 mg) by oral route once daily at bedtime for 30 days   DISP: (30) tablets with 5 refills  Discontinued on 12/03/2020     o Medications have been Reconciled  o Transition of Care or Provider Policy  · Instructions  o Daily foot care. Avoid walking barefoot. Annual Dilated Eye Exam.  o Discussed with patient blood pressure monitoring,  hemoglobin A1C levels need to be below 7.0, and LDL (Lipid) goals below 70.  o Patient advised to monitor blood pressure (B/P) at home and journal readings. Patient informed that a B/P reading at home of more than 130/80 is considered hypertension. For readings greater aaof535/90 or higher patient is advised to follow up in the office with readings for management. Patient advised to limit sodium intake.  o Maintain a healthy weight. Avoid tight fitting clothes. Avoid fried, fatty foods, tomato sauce, chocolate, mint, garlic, onion, alcohol. caffeine. Eat smaller meals, dont lie down after a meal, dont smoke. Elevate the head of your bed 6-9 inches.  o Advised that cheeses and other sources of dairy fats, animal fats, fast food, and the extras (candy, pastries, pies, doughnuts and cookies) all contain LDL raising nutrients. Advised to increase fruits, vegetables, whole grains, and to monitor portion sizes.   o Plan Of Care: try eating protein or somethign sweet when she feels shaky, she may be having low blood sugar, will check labs and call with results.   o Chronic conditions reviewed and taken into consideration for today's treatment plan.  o Patient instructed to seek medical attention urgently for new or worsening symptoms.  o Patient was educated/instructed on their diagnosis, treatment and medications prior to discharge from the clinic today.  o Take all medications as prescribed/directed.  o Call the office with any concerns or questions.  o Discussed Covid-19 precautions including, but not limited to, social distancing, avoid touching your face, and hand washing.   o for lyly PF, she is going to do icing, stretches, anti inflammatories and a PF sock, let me know if she would like to try PT or podiatry consult  o keep working on weight loss, praised for her efforts  o refill meds, and she will go for labs  · Disposition  o Call or Return if symptoms worsen or persist.  o F/u appt in 6  months            Electronically Signed by: RAHEEM Munoz -Author on December 3, 2020 11:48:20 AM

## 2021-05-13 NOTE — PROGRESS NOTES
Progress Note      Patient Name: Carlota Robin   Patient ID: [id number]   Sex: Female   YOB: 1970    Primary Care Provider: Audra MACIEL   Referring Provider: Ciarra MACIEL    Visit Date: June 16, 2020    Provider: RAHEEM Munoz   Location: [facility]   Location Address: [address] [place]  [id number]   Location Phone: [phone]          Chief Complaint     Pre-op clearance       History Of Present Illness     pre op physical before breast reduction    HTN- has been running good, was elevated this morning    type 2 dm, she is on metformin needs blood checked today    DANY- she hasn't been wearing her cpap. It slips off her face.  She is going to have it adjusted, she just hasn't made the appt.     obesity- She had an apt with Dr Hudson that was canceled and she hasn't rescheduled, she hasn't been able to lose weight on her own.     mary d def- on weekly supplements    allergies- on antihistamines and singular, doing ok    rad- she is short of breath, coughing and wheezing, she thinks it's weight related    depression, doing good with Cymbalta and abilify    macromastia, she is needing a preop physical for surgery clearance now    gerd, on protonix but the 20mg dose is not helping    she has been having urinary symptoms for the last week with burning.       Past Medical History  Disease Name Date Onset Notes   Acne --  --    Allergic rhinitis, chronic --  --    Anxiety --  --    B12 deficiency 06/16/2020 --    Depression --  --    Diabetes mellitus, type 2 06/08/2020 --    Essential hypertension 06/08/2020 --    Fibromyalgia --  --    GERD --  --    High cholesterol --  --    Hypertension --  --    Hypothyroidism 06/16/2020 --    Inguinal hernia --  --    Insomina --  --    Macromastia --  --    Migraine --  --    Obesity 02/25/2020 --    DANY (obstructive sleep apnea) 06/08/2020 --    Right bundle branch block 06/16/2020 --    Seasonal  allergies --  --    Sinus trouble --  --    Stomach pain 2016 --    Stress 2016 --    Thyroid Problems --  --    Urinary incontinence --  --    Vitamin D deficiency 06/08/2020 --          Past Surgical History  Procedure Name Date Notes   Breast biopsy, right breast --  --    Cesarian Section --  --    Cholecystectomy --  --    Colonoscopy --  --    Cysto with Bladder Botox 2-1-19 2/1/19 Cystoscopy, Bladder Botox 100 units with  11/8/19 Cystoscopy, Bladder Botox 100 units with    Endoscopy --  --    Hernia --  --    Hysterectomy 05/2017 --    [place] Tooth Extraction --  --          Medication List  Name Date Started Instructions   Abilify 5 mg oral tablet 06/08/2020 take 1 tablet (5 mg) by oral route once daily for 30 days   amlodipine 10 mg oral tablet 06/08/2020 take 1 tablet (10 mg) by oral route once daily for 30 days   bilateral cock up splints 10/31/2018 wear daily as tolerated   Calcium 600 600 mg calcium (1,500 mg) oral tablet 03/30/2020 take 1 tablet by oral route daily for 30 days   cimetidine 200 mg oral tablet 06/09/2020 take 1 tablet (200 mg) by oral route 2 times per day 30 minutes before meals   Cymbalta 60 mg oral capsule,delayed release(/EC) 06/08/2020 take 1 capsule (60 mg) by oral route once daily for 30 days   estradiol 0.05 mg/24 hr transdermal patch weekly  apply 1 patch by transdermal route twice week   estradiol 10 mcg vaginal tablet  insert 1 tablet (10 mcg) by vaginal route twice weekly   famotidine 20 mg oral tablet 06/08/2020 take 1 tablet (20 mg) by oral route once daily at bedtime for 30 days   hydrochlorothiazide 12.5 mg oral tablet 06/08/2020 take 1 tablet (12.5 mg) by oral route once daily for 30 days   Iron 65 mg oral  1 QD   melatonin 5 mg oral tablet  take 1 tablet by oral route daily   meloxicam 7.5 mg oral tablet 06/08/2020 take 1 tablet (7.5 mg) by oral route once daily   metformin 500 mg oral tablet 06/08/2020 take 1 tablet by oral route daily for 90 days    ProAir RespiClick 90 mcg/actuation inhalation aerosol powdr breath activated 11/21/2019 inhale 1 - 2 puffs (90 - 180 mcg) by inhalation route every 4-6 hours as needed   Probiotic oral  50 billon daily   Protonix 20 mg oral tablet,delayed release (DR/EC) 06/08/2020 take 1 tablet (20 mg) by oral route once daily for 30 days   Singulair 10 mg oral tablet 06/08/2020 take 1 tablet (10 mg) by oral route once daily in the evening for 30 days   Synthroid 25 mcg oral tablet 06/08/2020 take 1 tablet (25 mcg) by oral route once daily   trazodone 50 mg oral tablet 06/08/2020 take 1 tablet (50 mg) by oral route once daily at bedtime   Vitamin B-12 1,000 mcg oral tablet  take 1 tablet by oral route daily   Vitamin D2 1,250 mcg (50,000 unit) oral capsule 06/08/2020 take 1 capsule by oral route Weekly   vitamin E oral  --    Zyrtec 10 mg oral tablet 06/08/2020 take 1 tablet (10 mg) by oral route once daily         Allergy List  Allergen Name Date Reaction Notes   hydrocodone-acetaminophen --  --  --    lisinopril --  cough --    pravastatin --  nausea --        Allergies Reconciled  Family Medical History  Disease Name Relative/Age Notes   Stroke Mother/   --    Diabetes, unspecified type  Uncle (maternal)   Renal Calculus Father/   Father   Family history of colon cancer Grandfather (paternal)/70s  Grandmother (maternal)/50s   Grandmother (maternal)/50s; Grandfather (paternal)/70s   Family history of breast cancer  Aunt/20s         Social History  Finding Status Start/Stop Quantity Notes   Alcohol Never 0/0 --  drinks no   Assessed for Abuse/Neglect --  --/-- --  Denies abuse   Caffeine Current some day 0/0 --  drinks occasionally; tea; 1-2 times per day    --  --/-- --  --    Other Substance Use Never --/-- --  --    Second hand smoke exposure Never 0/0 --  no   Tobacco Never 0/0 --  never a smoker         Immunizations  NameDate Admin Mfg Trade Name Lot Number Route Inj VIS Given VIS Publication   Afxetoxjk38/25/2019  NE Not Entered  NE NE 11/25/2019    Comments: [facility]   Swzhonqsy65/04/2019 NE Not Entered [id number] IM NE 01/04/2019    Comments: [facility]   Tdap11/25/2019 NE Not Entered  NE NE 11/25/2019    Comments: [facility]         Review of Systems  · Constitutional  o Admits  o : fatigue, weight gain  o Denies  o : fever, weight loss  · HENT  o Admits  o : nasal congestion  · Cardiovascular  o Admits  o : lower extremity edema  o Denies  o : claudication, chest pressure, palpitations  · Respiratory  o Admits  o : shortness of breath and wheezing  · Gastrointestinal  o Admits  o : acid reflux, bloating  o Denies  o : nausea, vomiting, diarrhea, constipation  · Genitourinary  o Admits  o : urgency, dysuria  · Neurologic  o Denies  o : altered mental status  · Psychiatric  o Admits  o : anxiety, depression  o Denies  o : suicidal ideation, homicidal ideation      Vitals  Date Time BP Position Site L\R Cuff Size HR RR TEMP (F) WT  HT  BMI kg/m2 BSA m2 O2 Sat        02/25/2020 02:20 /84 Sitting       216lbs 0oz 5'   42.18 2.04     06/08/2020 01:42 /71 Sitting       217lbs 0oz 5'   42.38 2.04     06/16/2020 09:58 /98 Sitting    88 - R 20 98.2 211lbs 7oz 5'   41.29 2.01 95 %          Physical Examination  · Constitutional  o Appearance  o : well developed, well-nourished, no acute distress  · Head and Face  o Head  o : normocephalic, atraumatic  · Ears, Nose, Mouth and Throat  o Nose  o : nasal mucosa edematous  o Throat  o : tonsils normal without exudate  · Respiratory  o Respiratory Effort  o : breathing unlabored  o Inspection of Chest  o : chest rise symmetric bilaterally  o Auscultation of Lungs  o : normal breath sounds  · Cardiovascular  o Heart  o :   § Auscultation of Heart  § : regular rate, normal rhythm, no murmurs present  o Peripheral Vascular System  o :   § Extremities  § : no edema  · Lymphatic  o Neck  o : no lymphadenopathy present, normal size  · Psychiatric  o Mood and  Affect  o : mood normal, affect appropriate     EKG shows a right bundle branch block but this is unchanged since 2017, EKG in [place]. Pt has had a f/u with cardiology. She is asymptomatic.           Results  · In-Office Procedures  o Lab procedure  § Automated Dipstick Urinalysis (Surg Spec) WITHOUT Micro [facility] (37035)   § Color Ur: Yellow   § Clarity Ur: Clear   § Glucose Ur Ql Strip: Negative   § Bilirub Ur Ql Strip: Negative   § Ketones Ur Ql Strip: Negative   § Sp Gr Ur Qn: 1.030   § Hgb Ur Ql Strip: Trace-Intact   § pH Ur-LsCnc: 5.5   § Prot Ur Ql Strip: 30 mg/dL   § Urobilinogen Ur Strip-mCnc: 0.2 E.U./dL   § Nitrite Ur Ql Strip: Negative   § WBC Est Ur Ql Strip: Small       Assessment  · Allergic rhinitis due to allergen     477.9/J30.9  · Diabetes mellitus, type 2     250.00/E11.9  · Essential hypertension     401.9/I10  · Hypothyroidism     244.9/E03.9  · Obesity     278.00/E66.9  · Vitamin D deficiency     268.9/E55.9  · Anxiety     300.02/F41.1  · Macromastia     611.1/N62  · Seasonal allergies     477.9/J30.2  · Fibromyalgia     729.1/M79.7  · DANY (obstructive sleep apnea)     327.23/G47.33  · B12 deficiency     266.2/E53.8  · UTI (urinary tract infection)     599.0/N39.0  · Right bundle branch block     426.4/I45.10      Plan  · Orders  o Hgb A1c [facility] (41316) - - 06/16/2020  o Physical, Primary Care Panel (CBC, CMP, Lipid, TSH) [facility] (58084, 10449, 96420, 30518) - - 06/16/2020  o Urine culture (45276, 80730) - 599.0/N39.0 - 06/16/2020  o Vitamin D (25-Hydroxy) Level (79870) - - 06/16/2020  o ACO-39: Current medications updated and reviewed () - - 06/16/2020  o ACO-14: Influenza immunization administered or previously received () - - 06/16/2020  o B12 Folate levels (B12FO) - - 06/16/2020  o Iron Profile (Iron 83378 TIBC 37504 and Transferrin 56882) (IRONP) - - 06/16/2020  o Estab. Pt. 15 Min Low/Moderate (31338) - - 06/16/2020  · Medications  o Macrobid 100 mg oral capsule   SIG: take 1 capsule  (100 mg) by oral route every 12 hours with food for 7 days   DISP: (14) capsules with 0 refills  Prescribed on 06/16/2020     o Protonix 40 mg oral tablet,delayed release (DR/EC)   SIG: take 1 tablet (40 mg) by oral route once daily for 30 days   DISP: (30) tablets with 5 refills  Adjusted on 06/16/2020     o Medications have been Reconciled  o Transition of Care or Provider Policy  · Instructions  o Patient advised to monitor blood pressure (B/P) at home and journal readings. Patient informed that a B/P reading at home of more than 130/80 is considered hypertension. For readings greater [id number]/90 or higher patient is advised to follow up in the office with readings for management. Patient advised to limit sodium intake.  o Take all medications as prescribed/directed.  o Patient was educated/instructed on their diagnosis, treatment and medications prior to discharge from the clinic today.  o will check labs, as long as those are unremarkable, will clear for surgery.   · Disposition  o Call or Return if symptoms worsen or persist.            Electronically Signed by: RAHEEM Munoz -Author on June 16, 2020 11:03:34 AM

## 2021-05-14 VITALS
BODY MASS INDEX: 41.46 KG/M2 | TEMPERATURE: 97.8 F | SYSTOLIC BLOOD PRESSURE: 137 MMHG | RESPIRATION RATE: 16 BRPM | HEART RATE: 96 BPM | WEIGHT: 211.19 LBS | OXYGEN SATURATION: 96 % | DIASTOLIC BLOOD PRESSURE: 86 MMHG | HEIGHT: 60 IN

## 2021-05-14 VITALS — SYSTOLIC BLOOD PRESSURE: 130 MMHG | DIASTOLIC BLOOD PRESSURE: 79 MMHG | HEART RATE: 79 BPM

## 2021-05-14 NOTE — PROGRESS NOTES
Progress Note      Patient Name: Carlota Robin   Patient ID: [id number]   Sex: Female   YOB: 1970    Primary Care Provider: Audra MACIEL   Referring Provider: Ciarra MACIEL    Visit Date: April 15, 2021    Provider: RAHEEM Munoz   Location: [facility]   Location Address: [address] [place] [address]  [id number]   Location Phone: [phone]          Chief Complaint  · discuss weight  · discuss thyroid  · labs      History Of Present Illness  Carlota Robin is a 50 year old /White female who presents for evaluation and treatment of:      physical/check up/refills    wants consult for weight loss management, she has cut way  back on her eating and carbs, stays active, feels short of breath when she lays down from the pressure of her breasts, went for a consult for breast reduction but didn't follow back up.     hypothyroid- on synthroid, can't tell a differnece, due for labs    HLP- She has been stable with Lipitor    Type 2 diabetes, on Metformin and tries to watch her sugars, she does not check her blood sugar at home.    Hypertension, doing well with amlodipine HCTZ lisinopril    MABLE/depression, she is on Cymbalta would like to go up on the dose, she also takes Abilify but really does not have anxiety any longer.  She is worried about her weight gain with the Abilify.    Arthritis, on meloxicam    bladder incontinence, was receiving botox injections but Dr Yuan and would like a referral to go back for that    DANY- non compliant wtih the CPAP    fatigue, tired all the time    Insomnia melatonin and trazodone.    Allergies Singulair and Zyrtec    Vitamin D deficiency currently on a weekly supplement    B12 deficiency on a daily supplement    GERD controlled with Protonix and cimetidine    Iron deficiency anemia, on iron supplements and tolerating well.    Due for mammogram, she reports that she had colon screening less  than 10 years ago we need to request those records.    She just had eye exam we will request those records  Has not yet had covid vaccine       Past Medical History  Disease Name Date Onset Notes   Acne --  --    Allergic rhinitis, chronic --  --    Anemia 12/03/2020 --    Anxiety --  --    B12 deficiency 06/16/2020 --    Depression --  --    Diabetes mellitus, type 2 06/08/2020 --    Essential hypertension 06/08/2020 --    Fibromyalgia --  --    GERD --  --    GERD (gastroesophageal reflux disease) 12/03/2020 --    High cholesterol --  --    Hyperlipidemia 12/03/2020 --    Hypertension --  --    Hypothyroidism 06/16/2020 --    Inguinal hernia --  --    Insomina --  --    Macromastia --  --    Migraine --  --    Obesity 02/25/2020 --    DANY (obstructive sleep apnea) 06/08/2020 --    Right bundle branch block 06/16/2020 --    Seasonal allergies --  --    Sinus trouble --  --    Stomach pain 2016 --    Stress 2016 --    Thyroid Problems --  --    Urinary incontinence --  --    Vitamin D deficiency 06/08/2020 --          Past Surgical History  Procedure Name Date Notes   Breast biopsy, right breast --  --    Cesarian Section --  --    Cholecystectomy --  --    Colonoscopy --  --    Cysto with Bladder Botox 2-1-19 2/1/19 Cystoscopy, Bladder Botox 100 units with  11/8/19 Cystoscopy, Bladder Botox 100 units with    Endoscopy --  --    Hernia --  --    Hysterectomy 05/2017 --    [place] Tooth Extraction --  --          Medication List  Name Date Started Instructions   Abilify 5 mg oral tablet 12/03/2020 take 1 tablet (5 mg) by oral route once daily for 30 days   amlodipine 10 mg oral tablet 12/03/2020 take 1 tablet (10 mg) by oral route once daily for 30 days   bilateral cock up splints 10/31/2018 wear daily as tolerated   Calcium 600 600 mg calcium (1,500 mg) oral tablet 12/03/2020 take 1 tablet by oral route daily for 30 days   cimetidine 200 mg oral tablet 12/03/2020 take 1 tablet (200 mg) by oral route  2 times per day 30 minutes before meals   Cymbalta 60 mg oral capsule,delayed release(DR/EC) 12/03/2020 take 1 capsule (60 mg) by oral route once daily for 30 days   estradiol 0.05 mg/24 hr transdermal patch weekly  apply 1 patch by transdermal route twice week   hydrochlorothiazide 12.5 mg oral tablet 12/03/2020 take 1 tablet (12.5 mg) by oral route once daily for 30 days   Iron 65 mg oral  1 QD   Lipitor 20 mg oral tablet 12/03/2020 take 1 tablet (20 mg) by oral route once daily for 90 days   lisinopril 5 mg oral tablet 12/18/2020 take 1 tablet (5 mg) by oral route once daily   melatonin 5 mg oral tablet  take 1 tablet by oral route daily   meloxicam 7.5 mg oral tablet 12/03/2020 take 1 tablet (7.5 mg) by oral route once daily   metformin 1,000 mg oral tablet 12/03/2020 take 1 tablet (1,000 mg) by oral route 2 times per day with morning and evening meals for 90 days   ProAir RespiClick 90 mcg/actuation inhalation aerosol powdr breath activated 11/21/2019 inhale 1 - 2 puffs (90 - 180 mcg) by inhalation route every 4-6 hours as needed   Probiotic oral  50 billon daily   Protonix 40 mg oral tablet,delayed release (DR/EC) 12/03/2020 take 1 tablet (40 mg) by oral route once daily for 30 days   Singulair 10 mg oral tablet 12/03/2020 take 1 tablet (10 mg) by oral route once daily in the evening for 30 days   Synthroid 25 mcg oral tablet 12/03/2020 take 1 tablet (25 mcg) by oral route once daily   trazodone 50 mg oral tablet 12/03/2020 take 1 tablet (50 mg) by oral route once daily at bedtime   Vitamin B-12 1,000 mcg oral tablet  take 1 tablet by oral route daily   Vitamin D2 1,250 mcg (50,000 unit) oral capsule 12/03/2020 take 1 capsule by oral route Weekly   vitamin E oral  --    Zyrtec 10 mg oral tablet 06/08/2020 take 1 tablet (10 mg) by oral route once daily         Allergy List  Allergen Name Date Reaction Notes   hydrocodone-acetaminophen --  --  --    lisinopril --  cough --    pravastatin --  nausea --         Allergies Reconciled  Family Medical History  Disease Name Relative/Age Notes   Stroke Mother/   --    Diabetes, unspecified type  Uncle (maternal)   Renal Calculus Father/   Father   Family history of colon cancer Grandfather (paternal)/70s  Grandmother (maternal)/50s   Grandmother (maternal)/50s; Grandfather (paternal)/70s   Family history of breast cancer  Aunt/20s         Social History  Finding Status Start/Stop Quantity Notes   Alcohol Never 0/0 --  drinks no   Assessed for Abuse/Neglect --  --/-- --  Denies abuse   Caffeine Current some day 0/0 --  drinks occasionally; tea; 1-2 times per day    --  --/-- --  --    Other Substance Use Never --/-- --  --    Second hand smoke exposure Never 0/0 --  no   Tobacco Never 0/0 --  never a smoker         Immunizations  NameDate Admin Mfg Trade Name Lot Number Route Inj VIS Given VIS Publication   Dvswhuezb36/25/2019 NE Not Entered  NE NE 11/25/2019    Comments: [facility]   Tdap11/25/2019 NE Not Entered  NE NE 11/25/2019    Comments: [facility]         Review of Systems  · Constitutional  o Admits  o : fatigue  o Denies  o : fever  · HENT  o Denies  o : headaches, vertigo, lightheadedness  · Breasts  o Denies  o : lumps, tenderness, swelling  · Cardiovascular  o Denies  o : lower extremity edema, claudication, chest pressure, palpitations  · Respiratory  o Admits  o : shortness of breath  o Denies  o : wheezing, cough, hemoptysis  · Gastrointestinal  o Admits  o : gerd  o Denies  o : nausea, vomiting, diarrhea, constipation, abdominal pain  · Genitourinary  o Admits  o : incontinence  o Denies  o : urgency, frequency  · Musculoskeletal  o Admits  o : joint pain  · Psychiatric  o Admits  o : depression  o Denies  o : anxiety, SI/HI      Vitals  Date Time BP Position Site L\R Cuff Size HR RR TEMP (F) WT  HT  BMI kg/m2 BSA m2 O2 Sat FR L/min FiO2 HC       04/15/2021 08:32 /86 Sitting    96 - R 16 97.8 211lbs 3oz 5'   41.24 2.01 96 %             Physical Examination  · Constitutional  o Appearance  o : well-nourished, well developed, alert, in no acute distress  · Neck  o Thyroid  o : gland size normal, nontender, no nodules or masses present on palpation  · Respiratory  o Respiratory Effort  o : breathing unlabored  o Auscultation of Lungs  o : normal breath sounds throughout  · Cardiovascular  o Heart  o :   § Auscultation of Heart  § : regular rate and rhythm, no murmurs, gallops or rubs  o Peripheral Vascular System  o :   § Carotid Arteries  § : normal pulses bilaterally, no bruits present  § Pedal Pulses  § : pulses 2 bilaterally  § Extremities  § : no cyanosis, clubbing or edema; less than 2 second refill noted  · Gastrointestinal  o Abdominal Examination  o : abdomen nontender to palpation, tone normal without rigidity or guarding, no masses present, abdominal contour scaphoid  o Liver and spleen  o : no hepatomegaly present, liver nontender to palpation, spleen not palpable  · Neurologic  o Mental Status Examination  o :   § Orientation  § : grossly oriented to person, place and time  o Cranial Nerves  o : cranial nerves intact and symmetric throughout  · Psychiatric  o Mood and Affect  o : mood normal, affect appropriate, denies any SI/HI              Assessment  · Annual physical exam     V70.0/Z00.00  · Allergic rhinitis due to allergen     477.9/J30.9  · Anemia     285.9/D64.9  · Anxiety disorder     300.00/F41.9  · Diabetes mellitus, type 2     250.00/E11.9  · Essential hypertension     401.9/I10  · Fatigue     780.79/R53.83  · Hyperlipidemia     272.4/E78.5  · Hypothyroidism     244.9/E03.9  · Major depressive disorder     296.20/F32.2  · Obesity     278.00/E66.9  · Vitamin D deficiency     268.9/E55.9  · Screening for depression     V79.0/Z13.89  · Visit for screening mammogram     V76.12/Z12.31  · DANY (obstructive sleep apnea)     327.23/G47.33  · B12 deficiency     266.2/E53.8  · Macromastia     611.1/N62  · Urinary  incontinence     788.30/R32  · Fibromyalgia     729.1/M79.7      Plan  · Orders  o ACO-41: Dilated Diabetic eye exam completed this year and results in chart/reviewed (2022F) - 250.00/E11.9 - 04/15/2021   vision works 2021  o Annual depression screening, 15 minutes (, 66869) - V79.0/Z13.89 - 04/15/2021  o ACO-18: Positive screen for clinical depression using a standardized tool and a follow-up plan documented () - V79.0/Z13.89 - 04/15/2021  o Screening Mammography; Bilateral 3D (51432, , 92800) - V76.12/Z12.31 - 04/15/2021  o Hgb A1c [facility] (53537) - - 04/15/2021  o Physical, Primary Care Panel (CBC, CMP, Lipid, TSH) [facility] (23805, 93053, 48852, 71318) - - 04/15/2021  o Vitamin D (25-Hydroxy) Level (40659) - - 04/15/2021  o ACO-19: Colorectal cancer screening results documented and reviewed (3017F) - - 04/15/2021   2019  o ACO-39: Current medications updated and reviewed (, 1159F) - - 04/15/2021  o B12 Folate levels (B12FO) - - 04/15/2021  o Iron Profile (Iron 57627 TIBC 34793 and Transferrin 58858) (IRONP) - - 04/15/2021  o Medical Wt Mgmt Consult (MWMCO) - 278.00/E66.9 - 04/15/2021  o UROLOGY CONSULTATION (UROLO) - 788.30/R32 - 04/15/2021   has seen dr mcknight for this, wants botox injections again  o Estab. Pt. 15 Min Low/Moderate (55917) - - 04/15/2021  · Medications  o Wellbutrin  mg oral tablet extended release 24 hr   SIG: take 1 tablet (150 mg) by oral route once daily   DISP: (30) Tablet with 1 refills  Prescribed on 04/15/2021     o amlodipine 10 mg oral tablet   SIG: take 1 tablet (10 mg) by oral route once daily for 30 days   DISP: (30) Tablet with 5 refills  Refilled on 04/15/2021     o Calcium 600 600 mg calcium (1,500 mg) oral tablet   SIG: take 1 tablet by oral route daily for 30 days   DISP: (30) Tablet with 5 refills  Refilled on 04/15/2021     o cimetidine 200 mg oral tablet   SIG: take 1 tablet (200 mg) by oral route 2 times per day 30 minutes before meals   DISP: (60) Tablet  with 5 refills  Refilled on 04/15/2021     o Cymbalta 60 mg oral capsule,delayed release(DR/EC)   SIG: take 1 capsule (60 mg) by oral route once daily for 30 days   DISP: (30) Capsule with 5 refills  Refilled on 04/15/2021     o hydrochlorothiazide 12.5 mg oral tablet   SIG: take 1 tablet (12.5 mg) by oral route once daily for 30 days   DISP: (30) Tablet with 5 refills  Refilled on 04/15/2021     o Lipitor 20 mg oral tablet   SIG: take 1 tablet (20 mg) by oral route once daily for 90 days   DISP: (90) Tablet with 1 refills  Refilled on 04/15/2021     o lisinopril 5 mg oral tablet   SIG: take 1 tablet (5 mg) by oral route once daily   DISP: (30) Tablet with 5 refills  Refilled on 04/15/2021     o meloxicam 7.5 mg oral tablet   SIG: take 1 tablet (7.5 mg) by oral route once daily   DISP: (30) Tablet with 5 refills  Refilled on 04/15/2021     o metformin 1,000 mg oral tablet   SIG: take 1 tablet (1,000 mg) by oral route 2 times per day with morning and evening meals for 90 days   DISP: (180) Tablet with 1 refills  Refilled on 04/15/2021     o Protonix 40 mg oral tablet,delayed release (DR/EC)   SIG: take 1 tablet (40 mg) by oral route once daily for 30 days   DISP: (30) Tablet with 5 refills  Refilled on 04/15/2021     o Singulair 10 mg oral tablet   SIG: take 1 tablet (10 mg) by oral route once daily in the evening for 30 days   DISP: (30) Tablet with 5 refills  Refilled on 04/15/2021     o Synthroid 25 mcg oral tablet   SIG: take 1 tablet (25 mcg) by oral route once daily   DISP: (30) Tablet with 5 refills  Refilled on 04/15/2021     o trazodone 50 mg oral tablet   SIG: take 1 tablet (50 mg) by oral route once daily at bedtime   DISP: (30) Tablet with 2 refills  Refilled on 04/15/2021     o Zyrtec 10 mg oral tablet   SIG: take 1 tablet (10 mg) by oral route once daily   DISP: (30) Tablet with 5 refills  Refilled on 04/15/2021     o Abilify 5 mg oral tablet   SIG: take 1 tablet (5 mg) by oral route once daily for 30  "days   DISP: (30) Tablet with 5 refills  Discontinued on 04/15/2021     o Medications have been Reconciled  o Transition of Care or Provider Policy  · Instructions  o Reviewed health maintenance flowsheet and updated information. Orders were placed and/or patient's response was documented.  o Daily foot care. Avoid walking barefoot. Annual Dilated Eye Exam.  o Discussed with patient blood pressure monitoring, hemoglobin A1C levels need to be below 7.0, and LDL (Lipid) goals below 70.  o Patient advised to monitor blood pressure (B/P) at home and journal readings. Patient informed that a B/P reading at home of more than 130/80 is considered hypertension. For readings greater [id number]/90 or higher patient is advised to follow up in the office with readings for management. Patient advised to limit sodium intake.  o Advised that cheeses and other sources of dairy fats, animal fats, fast food, and the extras (candy, pastries, pies, doughnuts and cookies) all contain LDL raising nutrients. Advised to increase fruits, vegetables, whole grains, and to monitor portion sizes.   o Patient agrees to a \"No Self Harm\" contract. Patient will either call , [facility], ER, [facility], [facility].  o The patient and I discussed the need for therapy, either with a certified counselor, psychologist, and/or family . If no improvement is noted or worsening of their condition, return to office or ER. But also discussed with patient that if they are non-responsive to the type of medication they may need to see a psychiatrist for further evaluation and management.   o Patient was given an SSRI/SSNRI medication and warned of possible side effects of the medication including potential for increase risk of suicidal thoughts and feelings. Patient was instructed that if they begin to exhibit any of these effects they will discontinue the medication immediately and contact our office or the ER ASAP.   o Depression " Screen completed and scanned into the EMR under the designated folder within the patient's documents.  o Today's PHQ-9 result is _20__  o The provider screening met the required time of 15 minutes.  o Take all medications as prescribed/directed.  o Patient was educated/instructed on their diagnosis, treatment and medications prior to discharge from the clinic today.  o Patient counseled to reduce calorie intake.  o Patient was instructed to exercise regularly.  o Patient instructed to seek medical attention urgently for new or worsening symptoms.  o Call the office with any concerns or questions.  o Minutes spent with patient including greater than 50% in Education/Counseling/Care Coordination.  o Time spent with the patient was minutes, more than 50% face to face.  o Chronic conditions reviewed and taken into consideration for today's treatment plan.  o For the anxiety and depression, wean off the Abilify and change to Wellbutrin, call with problems side effects were discussed. For the weight, hopefully weaning off Abilify will help some, will check labs today, and make consultation for weight loss management. We discussed different options and risks and benefits associated with both.  o f/u pulmo on the sleep apnea, would help with the fatigue  · Disposition  o Call or Return if symptoms worsen or persist.  o F/u appt in 3 months            Electronically Signed by: RAHEEM Munoz -Author on April 15, 2021 09:48:24 AM

## 2021-05-15 VITALS
BODY MASS INDEX: 41.51 KG/M2 | RESPIRATION RATE: 20 BRPM | DIASTOLIC BLOOD PRESSURE: 98 MMHG | WEIGHT: 211.44 LBS | HEIGHT: 60 IN | OXYGEN SATURATION: 95 % | HEART RATE: 88 BPM | SYSTOLIC BLOOD PRESSURE: 142 MMHG | TEMPERATURE: 98.2 F

## 2021-05-15 VITALS
HEIGHT: 60 IN | OXYGEN SATURATION: 94 % | SYSTOLIC BLOOD PRESSURE: 137 MMHG | HEART RATE: 89 BPM | RESPIRATION RATE: 12 BRPM | TEMPERATURE: 98.4 F | WEIGHT: 210.56 LBS | DIASTOLIC BLOOD PRESSURE: 89 MMHG | BODY MASS INDEX: 41.34 KG/M2

## 2021-05-15 VITALS — RESPIRATION RATE: 16 BRPM | BODY MASS INDEX: 34.66 KG/M2 | HEIGHT: 64 IN | WEIGHT: 203 LBS

## 2021-05-15 VITALS
OXYGEN SATURATION: 96 % | HEART RATE: 92 BPM | TEMPERATURE: 97.6 F | HEIGHT: 60 IN | RESPIRATION RATE: 16 BRPM | WEIGHT: 203.37 LBS | BODY MASS INDEX: 39.93 KG/M2 | DIASTOLIC BLOOD PRESSURE: 81 MMHG | SYSTOLIC BLOOD PRESSURE: 117 MMHG

## 2021-05-15 VITALS
HEIGHT: 60 IN | WEIGHT: 207 LBS | BODY MASS INDEX: 40.64 KG/M2 | SYSTOLIC BLOOD PRESSURE: 136 MMHG | DIASTOLIC BLOOD PRESSURE: 86 MMHG

## 2021-05-15 VITALS
HEIGHT: 60 IN | BODY MASS INDEX: 42.44 KG/M2 | WEIGHT: 216.19 LBS | BODY MASS INDEX: 42.41 KG/M2 | TEMPERATURE: 98.3 F | DIASTOLIC BLOOD PRESSURE: 99 MMHG | HEART RATE: 86 BPM | SYSTOLIC BLOOD PRESSURE: 126 MMHG | WEIGHT: 216 LBS | RESPIRATION RATE: 20 BRPM | OXYGEN SATURATION: 96 % | SYSTOLIC BLOOD PRESSURE: 138 MMHG | DIASTOLIC BLOOD PRESSURE: 84 MMHG | HEIGHT: 60 IN

## 2021-05-15 VITALS — WEIGHT: 204.37 LBS | HEIGHT: 60 IN | BODY MASS INDEX: 40.12 KG/M2 | RESPIRATION RATE: 16 BRPM

## 2021-05-15 VITALS — DIASTOLIC BLOOD PRESSURE: 78 MMHG | SYSTOLIC BLOOD PRESSURE: 132 MMHG

## 2021-05-15 VITALS
BODY MASS INDEX: 42.6 KG/M2 | SYSTOLIC BLOOD PRESSURE: 115 MMHG | WEIGHT: 217 LBS | DIASTOLIC BLOOD PRESSURE: 71 MMHG | HEIGHT: 60 IN

## 2021-05-16 VITALS
HEIGHT: 60 IN | HEART RATE: 90 BPM | TEMPERATURE: 98.5 F | WEIGHT: 188.06 LBS | BODY MASS INDEX: 36.92 KG/M2 | DIASTOLIC BLOOD PRESSURE: 68 MMHG | SYSTOLIC BLOOD PRESSURE: 105 MMHG | OXYGEN SATURATION: 98 % | RESPIRATION RATE: 16 BRPM

## 2021-05-16 VITALS — WEIGHT: 206 LBS | RESPIRATION RATE: 12 BRPM | HEIGHT: 60 IN | BODY MASS INDEX: 40.44 KG/M2

## 2021-05-16 VITALS
BODY MASS INDEX: 41.67 KG/M2 | WEIGHT: 212.25 LBS | SYSTOLIC BLOOD PRESSURE: 134 MMHG | HEIGHT: 60 IN | DIASTOLIC BLOOD PRESSURE: 72 MMHG

## 2021-05-16 VITALS
WEIGHT: 206 LBS | OXYGEN SATURATION: 96 % | SYSTOLIC BLOOD PRESSURE: 170 MMHG | HEIGHT: 60 IN | RESPIRATION RATE: 12 BRPM | HEART RATE: 110 BPM | DIASTOLIC BLOOD PRESSURE: 104 MMHG | TEMPERATURE: 99.2 F | BODY MASS INDEX: 40.44 KG/M2

## 2021-05-16 VITALS
SYSTOLIC BLOOD PRESSURE: 140 MMHG | WEIGHT: 211 LBS | BODY MASS INDEX: 41.43 KG/M2 | HEIGHT: 60 IN | DIASTOLIC BLOOD PRESSURE: 102 MMHG | HEART RATE: 98 BPM

## 2021-05-16 VITALS
WEIGHT: 207.12 LBS | HEIGHT: 60 IN | RESPIRATION RATE: 14 BRPM | BODY MASS INDEX: 40.66 KG/M2 | OXYGEN SATURATION: 98 % | DIASTOLIC BLOOD PRESSURE: 68 MMHG | HEART RATE: 100 BPM | TEMPERATURE: 98.6 F | SYSTOLIC BLOOD PRESSURE: 119 MMHG

## 2021-05-16 VITALS
HEIGHT: 60 IN | BODY MASS INDEX: 40.84 KG/M2 | SYSTOLIC BLOOD PRESSURE: 140 MMHG | DIASTOLIC BLOOD PRESSURE: 86 MMHG | WEIGHT: 208 LBS

## 2021-05-16 VITALS
SYSTOLIC BLOOD PRESSURE: 132 MMHG | WEIGHT: 207.25 LBS | DIASTOLIC BLOOD PRESSURE: 85 MMHG | OXYGEN SATURATION: 96 % | TEMPERATURE: 97.6 F | BODY MASS INDEX: 40.69 KG/M2 | RESPIRATION RATE: 16 BRPM | HEART RATE: 81 BPM | HEIGHT: 60 IN

## 2021-05-16 VITALS
OXYGEN SATURATION: 98 % | HEART RATE: 98 BPM | SYSTOLIC BLOOD PRESSURE: 138 MMHG | TEMPERATURE: 98 F | RESPIRATION RATE: 16 BRPM | WEIGHT: 211.5 LBS | BODY MASS INDEX: 41.52 KG/M2 | HEIGHT: 60 IN | DIASTOLIC BLOOD PRESSURE: 84 MMHG

## 2021-05-16 VITALS
HEART RATE: 103 BPM | OXYGEN SATURATION: 97 % | RESPIRATION RATE: 16 BRPM | BODY MASS INDEX: 40.85 KG/M2 | TEMPERATURE: 99 F | HEIGHT: 60 IN | WEIGHT: 208.06 LBS | DIASTOLIC BLOOD PRESSURE: 84 MMHG | SYSTOLIC BLOOD PRESSURE: 138 MMHG

## 2021-05-26 PROBLEM — E03.9 HYPOTHYROIDISM: Status: ACTIVE | Noted: 2021-05-26

## 2021-05-26 PROBLEM — E66.01 MORBID (SEVERE) OBESITY DUE TO EXCESS CALORIES: Status: ACTIVE | Noted: 2021-05-26

## 2021-05-26 PROBLEM — G43.909 MIGRAINE: Status: ACTIVE | Noted: 2021-05-26

## 2021-05-26 PROBLEM — F41.9 ANXIETY: Status: ACTIVE | Noted: 2021-05-26

## 2021-05-26 PROBLEM — E53.8 VITAMIN B 12 DEFICIENCY: Status: ACTIVE | Noted: 2021-05-26

## 2021-05-26 PROBLEM — N62 MACROMASTIA: Status: ACTIVE | Noted: 2021-05-26

## 2021-05-26 PROBLEM — G47.00 INSOMNIA: Status: ACTIVE | Noted: 2021-05-26

## 2021-05-26 PROBLEM — F32.A DEPRESSION: Status: ACTIVE | Noted: 2021-05-26

## 2021-05-26 PROBLEM — D64.9 ANEMIA: Status: ACTIVE | Noted: 2021-05-26

## 2021-05-26 PROBLEM — I45.10 BUNDLE BRANCH BLOCK, RIGHT: Status: ACTIVE | Noted: 2021-05-26

## 2021-05-26 PROBLEM — E78.5 HYPERLIPIDEMIA: Status: ACTIVE | Noted: 2021-05-26

## 2021-05-26 PROBLEM — G47.33 OSA (OBSTRUCTIVE SLEEP APNEA): Status: ACTIVE | Noted: 2021-05-26

## 2021-05-26 PROBLEM — R32 URINARY INCONTINENCE: Status: ACTIVE | Noted: 2021-05-26

## 2021-05-26 PROBLEM — J34.9 SINUS TROUBLE: Status: ACTIVE | Noted: 2021-05-26

## 2021-05-26 PROBLEM — M79.7 FIBROMYALGIA: Status: ACTIVE | Noted: 2021-05-26

## 2021-05-26 PROBLEM — E55.9 VITAMIN D DEFICIENCY: Status: ACTIVE | Noted: 2021-05-26

## 2021-05-26 PROBLEM — R53.83 FATIGUE: Status: ACTIVE | Noted: 2021-05-26

## 2021-05-26 PROBLEM — T78.40XA ALLERGIES: Status: ACTIVE | Noted: 2021-05-26

## 2021-05-26 PROBLEM — L70.9 ACNE: Status: ACTIVE | Noted: 2021-05-26

## 2021-05-26 PROBLEM — K21.9 GERD (GASTROESOPHAGEAL REFLUX DISEASE): Status: ACTIVE | Noted: 2021-05-26

## 2021-05-26 PROBLEM — I10 HYPERTENSION: Status: ACTIVE | Noted: 2021-05-26

## 2021-05-26 PROBLEM — E11.9 TYPE 2 DIABETES MELLITUS (HCC): Status: ACTIVE | Noted: 2021-05-26

## 2021-05-26 RX ORDER — PHENOL 1.4 %
600 AEROSOL, SPRAY (ML) MUCOUS MEMBRANE DAILY
COMMUNITY
End: 2021-06-28 | Stop reason: SDUPTHER

## 2021-05-26 RX ORDER — BUPROPION HYDROCHLORIDE 150 MG/1
150 TABLET ORAL DAILY
COMMUNITY

## 2021-05-26 RX ORDER — AMLODIPINE BESYLATE 10 MG/1
10 TABLET ORAL DAILY
COMMUNITY
End: 2021-11-18

## 2021-05-26 RX ORDER — DULOXETIN HYDROCHLORIDE 60 MG/1
60 CAPSULE, DELAYED RELEASE ORAL DAILY
COMMUNITY
End: 2021-11-18

## 2021-05-26 RX ORDER — TRAZODONE HYDROCHLORIDE 50 MG/1
50 TABLET ORAL NIGHTLY
COMMUNITY
End: 2021-12-02 | Stop reason: SDUPTHER

## 2021-05-26 RX ORDER — MONTELUKAST SODIUM 10 MG/1
10 TABLET ORAL NIGHTLY
COMMUNITY
End: 2021-11-18

## 2021-05-26 RX ORDER — CHOLECALCIFEROL (VITAMIN D3) 125 MCG
5 CAPSULE ORAL NIGHTLY PRN
COMMUNITY

## 2021-05-26 RX ORDER — ESTRADIOL 0.05 MG/D
1 PATCH TRANSDERMAL WEEKLY
COMMUNITY

## 2021-05-26 RX ORDER — LISINOPRIL 5 MG/1
5 TABLET ORAL DAILY
COMMUNITY
End: 2021-11-18

## 2021-05-26 RX ORDER — CETIRIZINE HYDROCHLORIDE 10 MG/1
10 TABLET ORAL DAILY
COMMUNITY

## 2021-05-26 RX ORDER — PANTOPRAZOLE SODIUM 40 MG/1
40 TABLET, DELAYED RELEASE ORAL DAILY
COMMUNITY
End: 2021-11-18

## 2021-05-26 RX ORDER — SACCHAROMYCES BOULARDII 250 MG
250 CAPSULE ORAL 2 TIMES DAILY
COMMUNITY

## 2021-05-26 RX ORDER — LEVOTHYROXINE SODIUM 0.03 MG/1
25 TABLET ORAL DAILY
COMMUNITY
End: 2021-11-18

## 2021-05-26 RX ORDER — ERGOCALCIFEROL 1.25 MG/1
50000 CAPSULE ORAL WEEKLY
COMMUNITY
End: 2021-10-21

## 2021-05-26 RX ORDER — ATORVASTATIN CALCIUM 20 MG/1
20 TABLET, FILM COATED ORAL DAILY
COMMUNITY
End: 2021-11-18

## 2021-05-26 RX ORDER — HYDROCHLOROTHIAZIDE 12.5 MG/1
12.5 TABLET ORAL DAILY
COMMUNITY
End: 2021-11-18

## 2021-05-26 RX ORDER — MELOXICAM 7.5 MG/1
7.5 TABLET ORAL DAILY
COMMUNITY
End: 2021-11-18

## 2021-06-16 ENCOUNTER — OFFICE VISIT (OUTPATIENT)
Dept: UROLOGY | Facility: CLINIC | Age: 51
End: 2021-06-16

## 2021-06-16 VITALS
SYSTOLIC BLOOD PRESSURE: 138 MMHG | DIASTOLIC BLOOD PRESSURE: 76 MMHG | HEIGHT: 60 IN | BODY MASS INDEX: 39.07 KG/M2 | WEIGHT: 199 LBS

## 2021-06-16 DIAGNOSIS — N30.00 ACUTE CYSTITIS WITHOUT HEMATURIA: ICD-10-CM

## 2021-06-16 DIAGNOSIS — N39.45 CONTINUOUS LEAKAGE OF URINE: ICD-10-CM

## 2021-06-16 DIAGNOSIS — N32.81 OAB (OVERACTIVE BLADDER): Primary | ICD-10-CM

## 2021-06-16 PROBLEM — K40.90 INGUINAL HERNIA: Status: ACTIVE | Noted: 2021-06-16

## 2021-06-16 PROBLEM — F43.9 STRESS: Status: ACTIVE | Noted: 2021-06-16

## 2021-06-16 PROBLEM — R10.9 STOMACH PAIN: Status: ACTIVE | Noted: 2021-06-16

## 2021-06-16 PROBLEM — F33.0 MILD RECURRENT MAJOR DEPRESSION (HCC): Status: ACTIVE | Noted: 2021-06-16

## 2021-06-16 PROBLEM — J30.2 SEASONAL ALLERGIC RHINITIS: Status: ACTIVE | Noted: 2021-04-15

## 2021-06-16 LAB
BILIRUB BLD-MCNC: NEGATIVE MG/DL
CLARITY, POC: CLEAR
COLOR UR: YELLOW
GLUCOSE UR STRIP-MCNC: NEGATIVE MG/DL
KETONES UR QL: NEGATIVE
LEUKOCYTE EST, POC: ABNORMAL
NITRITE UR-MCNC: NEGATIVE MG/ML
PH UR: 5.5 [PH] (ref 5–8)
PROT UR STRIP-MCNC: NEGATIVE MG/DL
RBC # UR STRIP: NEGATIVE /UL
SP GR UR: 1.02 (ref 1–1.03)
UROBILINOGEN UR QL: NORMAL

## 2021-06-16 PROCEDURE — 99213 OFFICE O/P EST LOW 20 MIN: CPT | Performed by: UROLOGY

## 2021-06-16 PROCEDURE — 87086 URINE CULTURE/COLONY COUNT: CPT | Performed by: UROLOGY

## 2021-06-16 RX ORDER — SOLIFENACIN SUCCINATE 5 MG/1
TABLET, FILM COATED ORAL
COMMUNITY

## 2021-06-16 RX ORDER — PHENTERMINE HYDROCHLORIDE 37.5 MG/1
37.5 TABLET ORAL DAILY
COMMUNITY
Start: 2021-05-28 | End: 2021-12-02

## 2021-06-16 RX ORDER — LEVOFLOXACIN 500 MG/1
500 TABLET, FILM COATED ORAL DAILY
Qty: 7 TABLET | Refills: 0 | Status: SHIPPED | OUTPATIENT
Start: 2021-06-16 | End: 2021-06-23

## 2021-06-16 RX ORDER — PRAVASTATIN SODIUM 20 MG
TABLET ORAL
COMMUNITY
End: 2021-12-02

## 2021-06-16 RX ORDER — ONDANSETRON 4 MG/1
TABLET, ORALLY DISINTEGRATING ORAL
COMMUNITY
End: 2021-12-02

## 2021-06-16 RX ORDER — FLUTICASONE FUROATE 100 UG/1
POWDER RESPIRATORY (INHALATION)
COMMUNITY
End: 2021-12-02

## 2021-06-16 RX ORDER — ACETAMINOPHEN 325 MG/1
TABLET ORAL
COMMUNITY
End: 2022-09-27

## 2021-06-16 RX ORDER — MOMETASONE FUROATE 50 UG/1
SPRAY, METERED NASAL
COMMUNITY
End: 2021-12-02

## 2021-06-16 RX ORDER — ARIPIPRAZOLE 5 MG/1
5 TABLET ORAL DAILY
COMMUNITY
Start: 2021-05-18 | End: 2021-12-02 | Stop reason: SDUPTHER

## 2021-06-16 RX ORDER — FLUOXETINE HYDROCHLORIDE 20 MG/1
CAPSULE ORAL
COMMUNITY
End: 2021-12-02

## 2021-06-16 NOTE — PROGRESS NOTES
"Chief Complaint  Advice Only (BOTOX CONSULT)    Subjective          Carlota Robin presents to [facility] UROLOG[facility]  Patient states that she is ready for another bladder Botox.  It worked well and she was ready for 1 quite a while before this but was unable to do it due to Covid.  Her last bladder Botox was 2019.  She is having increased urgency frequency and urge incontinence.      Objective   Vital Signs:   /76   Ht 152.4 cm (60\")   Wt 90.3 kg (199 lb)   BMI 38.86 kg/m²     Physical Exam  Vitals and nursing note reviewed.   Constitutional:       Appearance: Normal appearance. She is well-developed.   Pulmonary:      Effort: Pulmonary effort is normal.      Breath sounds: Normal air entry.   Neurological:      Mental Status: She is alert and oriented to person, place, and time.      Motor: Motor function is intact.   Psychiatric:         Mood and Affect: Mood normal.         Behavior: Behavior normal.        Result Review :                 Assessment and Plan    Diagnoses and all orders for this visit:    1. OAB (overactive bladder) (Primary)  Assessment & Plan:  Schedule for bladder Botox.    Patient interested in Botox procedures at this point in time.  The risks and benefits of bladder Botox was discussed with the patient in detail.  These risks include urinary retention, infection, hematuria, reaction to the Botox.  It is possible that urinary retention could result in the need for intermittent catheterization by the patient.  Patient understands these risks and is agreeable to proceed.      2. Continuous leakage of urine  -     Cancel: POC Urinalysis Dipstick  -     POC Urinalysis Dipstick, Automated      Follow Up   No follow-ups on file.  Patient was given instructions and counseling regarding her condition or for health maintenance advice. Please see specific information pulled into the AVS if appropriate.       "

## 2021-06-16 NOTE — ASSESSMENT & PLAN NOTE
Schedule for bladder Botox.    Patient interested in Botox procedures at this point in time.  The risks and benefits of bladder Botox was discussed with the patient in detail.  These risks include urinary retention, infection, hematuria, reaction to the Botox.  It is possible that urinary retention could result in the need for intermittent catheterization by the patient.  Patient understands these risks and is agreeable to proceed.

## 2021-06-17 LAB — BACTERIA SPEC AEROBE CULT: NO GROWTH

## 2021-06-18 ENCOUNTER — PREP FOR SURGERY (OUTPATIENT)
Dept: OTHER | Facility: HOSPITAL | Age: 51
End: 2021-06-18

## 2021-06-18 ENCOUNTER — OFFICE VISIT (OUTPATIENT)
Dept: PLASTIC SURGERY | Facility: CLINIC | Age: 51
End: 2021-06-18

## 2021-06-18 VITALS
HEART RATE: 100 BPM | SYSTOLIC BLOOD PRESSURE: 125 MMHG | DIASTOLIC BLOOD PRESSURE: 89 MMHG | BODY MASS INDEX: 38.72 KG/M2 | OXYGEN SATURATION: 95 % | TEMPERATURE: 97.9 F | HEIGHT: 60 IN | WEIGHT: 197.2 LBS

## 2021-06-18 DIAGNOSIS — N62 MACROMASTIA: Primary | ICD-10-CM

## 2021-06-18 PROCEDURE — 99204 OFFICE O/P NEW MOD 45 MIN: CPT | Performed by: NURSE PRACTITIONER

## 2021-06-18 RX ORDER — CEFAZOLIN SODIUM 2 G/100ML
2 INJECTION, SOLUTION INTRAVENOUS ONCE
Status: CANCELLED | OUTPATIENT
Start: 2021-06-18 | End: 2021-06-18

## 2021-06-18 NOTE — PROGRESS NOTES
"Chief Complaint  Follow-up (Breast Reduction)    Subjective          History of Present Illness  Carlota Robin is a 50 y.o. female who presents to [facility] as a consult from PCP Ciarra MACIEL and would like to discuss breast reduction.  Her current bra size is a 44, H cup.  She would like to be a C cup.  She patient does have a personal or family history of breast cancer.  Neck, shoulders, and upper back pain present for 20 years.  Conservative treatments of chiropractor, exercises, massage, tylenol with little or temporary relief.  Experiences rashes, treats with baby powder, cortisone cream.  Trouble sleeping, cannot get comfortable.  Trouble exercising, cannot do activities that she would like to do, generally has to wear many sports bras and has to special order bras.  Recommendations for today of breast reduction.    Paternal cousin had breast cancer. Last mammogram 5/2021. Came back benign. Never has been prescribed medications or creams for rashes. No history of blood clots. Non smoker. Needing PCP surgical clearance.    Allergies: Pravastatin sodium, Hydrocodone-acetaminophen, and Lisinopril  Allergies Reconciled.    Review of Systems     Objective     /89 (BP Location: Left arm, Patient Position: Sitting, Cuff Size: Large Adult)   Pulse 100   Temp 97.9 °F (36.6 °C) (Temporal)   Ht 152.4 cm (60\")   Wt 89.4 kg (197 lb 3.2 oz)   SpO2 95%   BMI 38.51 kg/m²     Body mass index is 38.51 kg/m².    Physical Exam   Cardiovascular: Normal rate.     Pulmonary/Chest  Effort normal.     Breast  Her Body surface area is 1.86 meters squared.        Masses: No masses  Nipple Discharge: No nipple discharge  Breast Symmetry:  Axillary Lymphadenopathy: No axillary lymphadenopathy  SN-N (Right Breast): 34  SN-N (Left Breast): 34  SN-IMF (Right Breast): 21  SN-IMF (Left Breast): 21  N-IMF (Right Breast): 13  N-IMF (Left Breast): 13    Bilateral large " dense breast, bilateral IMFs with moisture and hyperpigmentation, mild erythema, grade 3 nipple ptosis, bilateral shoulder grooving    No masses or tenderness. Redness and moisture underneath bilateral breasts. Irritation underneath bilateral breasts.      Schnur Scale Score:  Body surface area is 1.86 meters squared.    Schnur: 482    Result Review :   The following data was reviewed by: RAHEEM Bai on 06/18/2021:             Assessment and Plan      Diagnoses and all orders for this visit:    1. Macromastia (Primary)             Plan:  • The patient was given literature in regards to the procedure and encouraged to visit the websites of ASPS and [employer].  • Pictures obtained.  • We will have the patient obtain pre-operative clearance from PCP.  • We discussed the procedure for bilateral breast reduction under general anesthesia as well as the postoperative recover time and the need for limitation of activities.  The risks of surgery were discussed including bleeding, infection, scarring (for which diagrams were drawn), pain, poor healing, loss of skin and or nipple, change in nipple sensation, inability to breast feed, asymmetry, no guarantee of post-operative cup size.    • I think that this patient is an excellent candidate for a breast reduction.  If feel that this procedure is medically necessary to relieve her symptoms.  I would anticipate resecting at least 482 grams of breast tissue from each breast.  • We will contact the insurance company for pre-authorization.    • This patient has my office number to call with any further questions.     I spent 45 minutes caring for Carlota on this date of service. This time includes time spent by me in the following activities:performing a medically appropriate examination and/or evaluation , counseling and educating the patient/family/caregiver, documenting information in the medical record, and care coordination    Follow Up     Return Pre op.    Patient was  given instructions and counseling regarding her condition or for health maintenance advice. Please see specific information pulled into the AVS if appropriate.     Abiola Sexton, RAHEEM  06/18/2021

## 2021-06-28 ENCOUNTER — OFFICE VISIT (OUTPATIENT)
Dept: UROLOGY | Facility: CLINIC | Age: 51
End: 2021-06-28

## 2021-06-28 VITALS
BODY MASS INDEX: 38.17 KG/M2 | HEIGHT: 60 IN | DIASTOLIC BLOOD PRESSURE: 84 MMHG | SYSTOLIC BLOOD PRESSURE: 142 MMHG | WEIGHT: 194.4 LBS

## 2021-06-28 DIAGNOSIS — N32.81 OAB (OVERACTIVE BLADDER): Primary | ICD-10-CM

## 2021-06-28 DIAGNOSIS — N39.45 CONTINUOUS LEAKAGE OF URINE: ICD-10-CM

## 2021-06-28 LAB
BILIRUB BLD-MCNC: NEGATIVE MG/DL
CLARITY, POC: CLEAR
COLOR UR: YELLOW
GLUCOSE UR STRIP-MCNC: NEGATIVE MG/DL
KETONES UR QL: ABNORMAL
LEUKOCYTE EST, POC: ABNORMAL
NITRITE UR-MCNC: NEGATIVE MG/ML
PH UR: 5.5 [PH] (ref 5–8)
PROT UR STRIP-MCNC: ABNORMAL MG/DL
RBC # UR STRIP: ABNORMAL /UL
SP GR UR: 1.03 (ref 1–1.03)
UROBILINOGEN UR QL: NORMAL

## 2021-06-28 PROCEDURE — 81003 URINALYSIS AUTO W/O SCOPE: CPT | Performed by: UROLOGY

## 2021-06-28 PROCEDURE — 52287 CYSTOSCOPY CHEMODENERVATION: CPT | Performed by: UROLOGY

## 2021-06-28 NOTE — PROGRESS NOTES
Bladder Botox    Date/Time: 6/28/2021 4:44 PM  Performed by: Dyan Yuan MD  Authorized by: Dyan Yuan MD   Preparation: Patient was prepped and draped in the usual sterile fashion.  Local anesthesia used: yes    Anesthesia:  Local anesthesia used: yes  Local Anesthetic: lidocaine 1% without epinephrine  Anesthetic total: 50 mL    Sedation:  Patient sedated: no    Patient tolerance: patient tolerated the procedure well with no immediate complications  Comments: Lane catheter inserted and 30cc of lidocaine was instilled and allowed to remain for 20 minutes.  Lane catheter was removed. The flexible cystoscope was inserted. Bladder was inspected and no abnormalities seen. 20 injection sites on the posterior bladder wall were injected with 0.5cc of botox for a total of 100 units. No complications. The cystoscope was removed. The patient tolerated the procedure well with no bleeding.        Sanna Botox:  NDC 9584-7430-49, lot # [id number], exp 10/2023

## 2021-07-12 ENCOUNTER — TELEPHONE (OUTPATIENT)
Dept: PLASTIC SURGERY | Facility: CLINIC | Age: 51
End: 2021-07-12

## 2021-07-12 NOTE — TELEPHONE ENCOUNTER
Contacted patient, advised that Dr. Melissa would no longer be with [facility] as of 7/31/21. That with this new development all surgical cases are being canceled as appropriate follow up care cannot occur. Patient verbalized understanding.

## 2021-10-21 RX ORDER — ERGOCALCIFEROL 1.25 MG/1
CAPSULE ORAL
Qty: 13 CAPSULE | Refills: 0 | Status: SHIPPED | OUTPATIENT
Start: 2021-10-21 | End: 2021-12-02 | Stop reason: SDUPTHER

## 2021-11-18 RX ORDER — PANTOPRAZOLE SODIUM 40 MG/1
TABLET, DELAYED RELEASE ORAL
Qty: 30 TABLET | Refills: 0 | Status: SHIPPED | OUTPATIENT
Start: 2021-11-18 | End: 2021-12-02 | Stop reason: SDUPTHER

## 2021-11-18 RX ORDER — HYDROCHLOROTHIAZIDE 12.5 MG/1
TABLET ORAL
Qty: 30 TABLET | Refills: 0 | Status: SHIPPED | OUTPATIENT
Start: 2021-11-18 | End: 2021-12-02 | Stop reason: SDUPTHER

## 2021-11-18 RX ORDER — MONTELUKAST SODIUM 10 MG/1
TABLET ORAL
Qty: 30 TABLET | Refills: 0 | Status: SHIPPED | OUTPATIENT
Start: 2021-11-18 | End: 2021-12-02 | Stop reason: SDUPTHER

## 2021-11-18 RX ORDER — ATORVASTATIN CALCIUM 20 MG/1
TABLET, FILM COATED ORAL
Qty: 30 TABLET | Refills: 0 | Status: SHIPPED | OUTPATIENT
Start: 2021-11-18 | End: 2021-12-02 | Stop reason: SDUPTHER

## 2021-11-18 RX ORDER — MELOXICAM 7.5 MG/1
TABLET ORAL
Qty: 30 TABLET | Refills: 0 | Status: SHIPPED | OUTPATIENT
Start: 2021-11-18 | End: 2021-12-02 | Stop reason: SDUPTHER

## 2021-11-18 RX ORDER — DULOXETIN HYDROCHLORIDE 60 MG/1
CAPSULE, DELAYED RELEASE ORAL
Qty: 30 CAPSULE | Refills: 0 | Status: SHIPPED | OUTPATIENT
Start: 2021-11-18 | End: 2021-12-14

## 2021-11-18 RX ORDER — LISINOPRIL 5 MG/1
TABLET ORAL
Qty: 30 TABLET | Refills: 0 | Status: SHIPPED | OUTPATIENT
Start: 2021-11-18 | End: 2021-12-14

## 2021-11-18 RX ORDER — AMLODIPINE BESYLATE 10 MG/1
TABLET ORAL
Qty: 30 TABLET | Refills: 0 | Status: SHIPPED | OUTPATIENT
Start: 2021-11-18 | End: 2021-12-02 | Stop reason: SDUPTHER

## 2021-11-18 RX ORDER — LEVOTHYROXINE SODIUM 0.03 MG/1
TABLET ORAL
Qty: 30 TABLET | Refills: 0 | Status: SHIPPED | OUTPATIENT
Start: 2021-11-18 | End: 2021-12-02 | Stop reason: SDUPTHER

## 2021-12-02 ENCOUNTER — OFFICE VISIT (OUTPATIENT)
Dept: FAMILY MEDICINE CLINIC | Facility: CLINIC | Age: 51
End: 2021-12-02

## 2021-12-02 VITALS — WEIGHT: 193 LBS | BODY MASS INDEX: 37.69 KG/M2

## 2021-12-02 DIAGNOSIS — G47.09 OTHER INSOMNIA: ICD-10-CM

## 2021-12-02 DIAGNOSIS — N32.81 OAB (OVERACTIVE BLADDER): ICD-10-CM

## 2021-12-02 DIAGNOSIS — E11.65 TYPE 2 DIABETES MELLITUS WITH HYPERGLYCEMIA, WITHOUT LONG-TERM CURRENT USE OF INSULIN (HCC): ICD-10-CM

## 2021-12-02 DIAGNOSIS — F41.9 ANXIETY: ICD-10-CM

## 2021-12-02 DIAGNOSIS — F33.0 MILD RECURRENT MAJOR DEPRESSION (HCC): ICD-10-CM

## 2021-12-02 DIAGNOSIS — E78.5 HYPERLIPIDEMIA, UNSPECIFIED HYPERLIPIDEMIA TYPE: ICD-10-CM

## 2021-12-02 DIAGNOSIS — E66.01 MORBID (SEVERE) OBESITY DUE TO EXCESS CALORIES (HCC): ICD-10-CM

## 2021-12-02 DIAGNOSIS — M79.7 FIBROMYALGIA: Primary | ICD-10-CM

## 2021-12-02 DIAGNOSIS — I10 PRIMARY HYPERTENSION: ICD-10-CM

## 2021-12-02 DIAGNOSIS — E53.8 VITAMIN B 12 DEFICIENCY: ICD-10-CM

## 2021-12-02 DIAGNOSIS — D50.9 IRON DEFICIENCY ANEMIA, UNSPECIFIED IRON DEFICIENCY ANEMIA TYPE: ICD-10-CM

## 2021-12-02 DIAGNOSIS — E55.9 VITAMIN D DEFICIENCY: ICD-10-CM

## 2021-12-02 DIAGNOSIS — E03.9 ACQUIRED HYPOTHYROIDISM: ICD-10-CM

## 2021-12-02 DIAGNOSIS — K21.9 GASTROESOPHAGEAL REFLUX DISEASE WITHOUT ESOPHAGITIS: ICD-10-CM

## 2021-12-02 DIAGNOSIS — J30.2 SEASONAL ALLERGIC RHINITIS, UNSPECIFIED TRIGGER: ICD-10-CM

## 2021-12-02 DIAGNOSIS — R53.82 CHRONIC FATIGUE: ICD-10-CM

## 2021-12-02 DIAGNOSIS — G47.33 OSA (OBSTRUCTIVE SLEEP APNEA): ICD-10-CM

## 2021-12-02 PROCEDURE — 99214 OFFICE O/P EST MOD 30 MIN: CPT | Performed by: NURSE PRACTITIONER

## 2021-12-02 RX ORDER — ARIPIPRAZOLE 5 MG/1
5 TABLET ORAL DAILY
Qty: 30 TABLET | Refills: 5 | Status: SHIPPED | OUTPATIENT
Start: 2021-12-02 | End: 2022-04-05 | Stop reason: SDUPTHER

## 2021-12-02 RX ORDER — ERGOCALCIFEROL 1.25 MG/1
50000 CAPSULE ORAL WEEKLY
Qty: 13 CAPSULE | Refills: 1 | Status: SHIPPED | OUTPATIENT
Start: 2021-12-02 | End: 2022-04-05 | Stop reason: SDUPTHER

## 2021-12-02 RX ORDER — AMLODIPINE BESYLATE 10 MG/1
10 TABLET ORAL DAILY
Qty: 30 TABLET | Refills: 5 | Status: SHIPPED | OUTPATIENT
Start: 2021-12-02 | End: 2022-04-05 | Stop reason: SDUPTHER

## 2021-12-02 RX ORDER — MELOXICAM 7.5 MG/1
7.5 TABLET ORAL DAILY
Qty: 30 TABLET | Refills: 5 | Status: SHIPPED | OUTPATIENT
Start: 2021-12-02 | End: 2022-04-05 | Stop reason: SDUPTHER

## 2021-12-02 RX ORDER — LISINOPRIL 5 MG/1
5 TABLET ORAL DAILY
Qty: 30 TABLET | Refills: 0 | Status: CANCELLED | OUTPATIENT
Start: 2021-12-02

## 2021-12-02 RX ORDER — MONTELUKAST SODIUM 10 MG/1
10 TABLET ORAL EVERY EVENING
Qty: 30 TABLET | Refills: 5 | Status: SHIPPED | OUTPATIENT
Start: 2021-12-02 | End: 2022-04-05 | Stop reason: SDUPTHER

## 2021-12-02 RX ORDER — PANTOPRAZOLE SODIUM 40 MG/1
40 TABLET, DELAYED RELEASE ORAL DAILY
Qty: 30 TABLET | Refills: 5 | Status: SHIPPED | OUTPATIENT
Start: 2021-12-02 | End: 2022-04-05 | Stop reason: SDUPTHER

## 2021-12-02 RX ORDER — LEVOTHYROXINE SODIUM 0.03 MG/1
25 TABLET ORAL DAILY
Qty: 30 TABLET | Refills: 5 | Status: SHIPPED | OUTPATIENT
Start: 2021-12-02 | End: 2022-04-05 | Stop reason: SDUPTHER

## 2021-12-02 RX ORDER — TRAZODONE HYDROCHLORIDE 50 MG/1
50 TABLET ORAL NIGHTLY
Qty: 30 TABLET | Refills: 2 | Status: SHIPPED | OUTPATIENT
Start: 2021-12-02 | End: 2022-05-19 | Stop reason: SDUPTHER

## 2021-12-02 RX ORDER — HYDROCHLOROTHIAZIDE 12.5 MG/1
12.5 TABLET ORAL DAILY
Qty: 30 TABLET | Refills: 5 | Status: SHIPPED | OUTPATIENT
Start: 2021-12-02 | End: 2022-04-05 | Stop reason: SDUPTHER

## 2021-12-02 RX ORDER — PRAVASTATIN SODIUM 20 MG
20 TABLET ORAL NIGHTLY
Qty: 30 TABLET | Refills: 5 | Status: CANCELLED | OUTPATIENT
Start: 2021-12-02

## 2021-12-02 RX ORDER — ATORVASTATIN CALCIUM 20 MG/1
20 TABLET, FILM COATED ORAL DAILY
Qty: 30 TABLET | Refills: 5 | Status: SHIPPED | OUTPATIENT
Start: 2021-12-02 | End: 2022-05-31

## 2021-12-02 RX ORDER — FLUOXETINE HYDROCHLORIDE 20 MG/1
20 CAPSULE ORAL DAILY
Qty: 30 CAPSULE | Refills: 5 | Status: CANCELLED | OUTPATIENT
Start: 2021-12-02

## 2021-12-02 NOTE — PROGRESS NOTES
"Chief Complaint  Med Refill (HTN, hyperlipidemia)    Subjective      Patient understanding that this is a telehealth visit.  I cannot perform a full physical exam, therefore something might be missed, or patient may need to come into the office for further evaluation.  Patient is understanding and consents to this type of visit.    This visit was done through [employer] and patient was home alone for the call.       History of Present Illness  Carlota Robin presents to [facility]     Preparing for breast reduction surgery, she is doing well overall. She is due for labs. She reports she has a ocugh with lisinopril but it's not every day.    Obesity, she was on phentermine but stopped because they wouldn't do a breast reduction if she was seeing weight loss management.    Type 2 diabetes, on Metformin and tries to watch her sugars, she does not check her blood sugar at home.    Hypertension, doing well with amlodipine HCTZ     MABLE/depression, she is on Prozac and abilify, says it's doing \"ok.\"     Arthritis, on meloxicam    bladder incontinence, seeing urology, on vesicare    DANY- non compliant wtih the CPAP    fatigue, tired all the time    Insomnia melatonin and trazodone.    Allergies Singulair and Zyrtec    Vitamin D deficiency currently on a weekly supplement    B12 deficiency on a daily supplement    GERD controlled with Protonix and cimetidine    Iron deficiency anemia, on iron supplements and tolerating well.    She refuses the covid shot, she is willing to do a flu shot and plans to.     Carlota Robin  reports that she has never smoked. She has never used smokeless tobacco.         Allergies  Pravastatin sodium, Hydrocodone-acetaminophen, and Lisinopril    Objective     There were no vitals filed for this visit.  Body mass index is 37.69 kg/m².     Review of Systems    Physical Exam     Gen: well-nourished, no acute distress  HENT: atraumatic, normocephalic  Eyes: " extraocular movements intact, no scleral icterus  Lung: breathing comfortably, no cough  Skin: no visible rash, no lesions  Neuro: grossly oriented to person, place, and time. no facial droop   Psych: normal mood and affect        Result Review :    The following data was reviewed by: RAHEEM Munoz on 12/02/2021:       Depression: Not at risk   • PHQ-2 Score: 0       Common labs    Common Labsle 12/14/20 12/14/20 4/15/21 4/15/21 4/15/21    1211 1211 0900 0900 0900   Glucose  120 (A)  138 (A)    BUN  13  13    Creatinine  0.69  0.72    Sodium  139  140    Potassium  3.9  4.0    Chloride  100  99    Calcium  9.5  9.3    Albumin  4.3  4.3    Total Bilirubin  0.29  0.29    Alkaline Phosphatase  107  89    AST (SGOT)  21  31    ALT (SGPT)  33  33    WBC  8.79  7.00    Hemoglobin  15.1  14.8    Hematocrit  45.9  46.6    Platelets  310  301    Total Cholesterol  241 (A)  251 (A)    Triglycerides  333 (A)  421 (A)    HDL Cholesterol  42  40    LDL Cholesterol   132 (A)   162 (A)   Hemoglobin A1C 6.6 (A)  6.8 (A)     (A) Abnormal value       Comments are available for some flowsheets but are not being displayed.                           Assessment and Plan     Diagnoses and all orders for this visit:    1. Fibromyalgia (Primary)  -     meloxicam (MOBIC) 7.5 MG tablet; Take 1 tablet by mouth Daily.  Dispense: 30 tablet; Refill: 5    2. Type 2 diabetes mellitus with hyperglycemia, without long-term current use of insulin (HCC)  Comments:  currently stable with metformin, will eval with lab results.   Orders:  -     metFORMIN (GLUCOPHAGE) 1000 MG tablet; Take 1 tablet by mouth 2 (Two) Times a Day With Meals.  Dispense: 60 tablet; Refill: 5  -     Hemoglobin A1c; Future  -     Comprehensive Metabolic Panel; Future  -     Microalbumin / Creatinine Urine Ratio - Urine, Clean Catch; Future    3. Chronic fatigue    4. Other insomnia  -     traZODone (DESYREL) 50 MG tablet; Take 1 tablet by mouth Every Night.   Dispense: 30 tablet; Refill: 2    5. DANY (obstructive sleep apnea)    6. Mild recurrent major depression (HCC)  Comments:  stable with cymbalta and abilify    7. Anxiety  Comments:  stable with current meds    Orders:  -     ARIPiprazole (ABILIFY) 5 MG tablet; Take 1 tablet by mouth Daily.  Dispense: 30 tablet; Refill: 5    8. Iron deficiency anemia, unspecified iron deficiency anemia type  Comments:  will check labs, she is compliant wi meds  Orders:  -     CBC & Differential; Future  -     Iron and TIBC; Future    9. OAB (overactive bladder)  Comments:  improved with vesicare    10. Morbid (severe) obesity due to excess calories (HCC)    11. Gastroesophageal reflux disease without esophagitis  -     pantoprazole (PROTONIX) 40 MG EC tablet; Take 1 tablet by mouth Daily.  Dispense: 30 tablet; Refill: 5    12. Acquired hypothyroidism  -     levothyroxine (SYNTHROID, LEVOTHROID) 25 MCG tablet; Take 1 tablet by mouth Daily.  Dispense: 30 tablet; Refill: 5  -     TSH; Future    13. Primary hypertension  -     hydroCHLOROthiazide (HYDRODIURIL) 12.5 MG tablet; Take 1 tablet by mouth Daily.  Dispense: 30 tablet; Refill: 5  -     amLODIPine (NORVASC) 10 MG tablet; Take 1 tablet by mouth Daily.  Dispense: 30 tablet; Refill: 5    14. Hyperlipidemia, unspecified hyperlipidemia type  Comments:  stable with medication  Orders:  -     atorvastatin (LIPITOR) 20 MG tablet; Take 1 tablet by mouth Daily for 180 days.  Dispense: 30 tablet; Refill: 5  -     Lipid Panel With / Chol / HDL Ratio; Future    15. Seasonal allergic rhinitis, unspecified trigger  Comments:  improved with medication  Orders:  -     montelukast (SINGULAIR) 10 MG tablet; Take 1 tablet by mouth Every Evening.  Dispense: 30 tablet; Refill: 5    16. Vitamin B 12 deficiency  -     Vitamin B12; Future  -     Folate; Future    17. Vitamin D deficiency  -     vitamin D (ERGOCALCIFEROL) 1.25 MG (66614 UT) capsule capsule; Take 1 capsule by mouth 1 (One) Time Per Week.   Dispense: 13 capsule; Refill: 1  -     Vitamin D 25 Hydroxy; Future    Other orders  -     vitamin E 200 UNIT capsule; Take 1 capsule by mouth Daily.  Dispense: 30 capsule; Refill: 5            Follow Up     Return in about 3 months (around 3/2/2022).    Patient was given instructions and counseling regarding her condition or for health maintenance advice. Please see specific information pulled into the AVS if appropriate.     RAHEEM Munoz  Answers for HPI/ROS submitted by the patient on 11/30/2021  Please describe your symptoms.: n/a.  Have you had these symptoms before?: No  How long have you been having these symptoms?: 1-4 days  Please list any medications you are currently taking for this condition.: n/a.  Please describe any probable cause for these symptoms. : n/a.  What is the primary reason for your visit?: Other

## 2021-12-06 NOTE — PROGRESS NOTES
"Chief Complaint  Follow-up (consult for BBR)    Subjective          History of Present Illness  Carlota Robin is a 51 y.o. female who presents to [facility] as a consult from [unfilled] and would like to discuss breast reduction.  Her current bra size is a 42 H,   She would like to be a C cup.  She patient does have a cousin on dads side. personal or family history of breast cancer.  Neck, shoulders, and upper back pain present for 20 years.  Conservative treatments of chiropractor, massage, with little or temporary relief.  Experiences rashes, treats with cortizone cream and baby powder.  Trouble sleeping, cannot get comfortable.  Trouble exercising, cannot do activities that she would like to do, generally has to wear many sports bras and has to special order bras.  Recommendations for today of breast reduction.  Recommendations for weight loss in order to lower the Schur requirement.     Allergies: Pravastatin sodium, Hydrocodone-acetaminophen, and Lisinopril  Allergies Reconciled.    Review of Systems     Objective     BP (!) 146/101 (BP Location: Left arm, Patient Position: Sitting)   Pulse 97   Temp 97.8 °F (36.6 °C)   Ht 152.4 cm (60\")   Wt 90.3 kg (199 lb)   SpO2 95%   BMI 38.86 kg/m²     Body mass index is 38.86 kg/m².    Physical Exam  Masses: No masses  Nipple Discharge: No nipple discharge  Breast Symmetry:  Axillary Lymphadenopathy: No axillary lymphadenopathy  SN-N (Right Breast): 33.5  SN-N (Left Breast): 33  SN-IMF (Right Breast): 16.5  SN-IMF (Left Breast): 17  N-IMF (Right Breast):   N-IMF (Left Breast):  Base right: 18  Base left: 18    Schnur Scale Score: 482  Body surface area is 1.86 meters squared.      Result Review :                Assessment and Plan      Diagnoses and all orders for this visit:    1. Macromastia (Primary)             Plan:  • The patient was given literature in regards to the procedure and encouraged to visit " the websites of [employer] and [employer].  • Pictures obtained.  • We will have the patient send the report or undergo a pre-operative mammogram.  • We will have the patient obtain pre-operative clearance.  • We discussed the procedure for bilateral breast reduction under general anesthesia as well as the postoperative recover time and the need for limitation of activities.  The risks of surgery were discussed including bleeding, infection, scarring (for which diagrams were drawn), pain, poor healing, loss of skin and or nipple, change in nipple sensation, inability to breast feed, asymmetry, no guarantee of post-operative cup size.    • I think that this patient is an excellent candidate for a breast reduction.  If feel that this procedure is medically necessary to relieve her symptoms.  I would anticipate resecting at least 500  grams of breast tissue from each breast.  • We will contact the insurance company for pre-authorization.    • This patient has my office number to call with any further questions.     I spent 45 minutes caring for Carlota on this date of service. This time includes time spent by me in the following activities:performing a medically appropriate examination and/or evaluation , counseling and educating the patient/family/caregiver, documenting information in the medical record, and care coordination    Follow Up     No follow-ups on file.    Patient was given instructions and counseling regarding her condition. Please see specific information pulled into the AVS if appropriate.     Zeeshan Bone MD  12/09/2021

## 2021-12-09 ENCOUNTER — PREP FOR SURGERY (OUTPATIENT)
Dept: OTHER | Facility: HOSPITAL | Age: 51
End: 2021-12-09

## 2021-12-09 ENCOUNTER — OFFICE VISIT (OUTPATIENT)
Dept: PLASTIC SURGERY | Facility: CLINIC | Age: 51
End: 2021-12-09

## 2021-12-09 VITALS
DIASTOLIC BLOOD PRESSURE: 101 MMHG | OXYGEN SATURATION: 95 % | HEART RATE: 97 BPM | TEMPERATURE: 97.8 F | WEIGHT: 199 LBS | HEIGHT: 60 IN | BODY MASS INDEX: 39.07 KG/M2 | SYSTOLIC BLOOD PRESSURE: 146 MMHG

## 2021-12-09 DIAGNOSIS — N62 MACROMASTIA: Primary | ICD-10-CM

## 2021-12-09 PROCEDURE — 99215 OFFICE O/P EST HI 40 MIN: CPT | Performed by: SURGERY

## 2021-12-09 RX ORDER — CEFAZOLIN SODIUM 2 G/100ML
2 INJECTION, SOLUTION INTRAVENOUS ONCE
Status: CANCELLED | OUTPATIENT
Start: 2021-12-09 | End: 2021-12-09

## 2021-12-14 RX ORDER — DULOXETIN HYDROCHLORIDE 60 MG/1
CAPSULE, DELAYED RELEASE ORAL
Qty: 30 CAPSULE | Refills: 0 | Status: SHIPPED | OUTPATIENT
Start: 2021-12-14 | End: 2022-01-27

## 2021-12-14 RX ORDER — LISINOPRIL 5 MG/1
TABLET ORAL
Qty: 30 TABLET | Refills: 0 | Status: SHIPPED | OUTPATIENT
Start: 2021-12-14 | End: 2022-01-27

## 2021-12-30 ENCOUNTER — OFFICE VISIT (OUTPATIENT)
Dept: PLASTIC SURGERY | Facility: CLINIC | Age: 51
End: 2021-12-30

## 2021-12-30 VITALS
WEIGHT: 200.8 LBS | SYSTOLIC BLOOD PRESSURE: 145 MMHG | TEMPERATURE: 94.6 F | DIASTOLIC BLOOD PRESSURE: 96 MMHG | HEIGHT: 60 IN | HEART RATE: 108 BPM | OXYGEN SATURATION: 96 % | BODY MASS INDEX: 39.42 KG/M2

## 2021-12-30 DIAGNOSIS — N62 MACROMASTIA: Primary | ICD-10-CM

## 2021-12-30 PROCEDURE — 99215 OFFICE O/P EST HI 40 MIN: CPT | Performed by: SURGERY

## 2021-12-30 RX ORDER — LANOLIN ALCOHOL/MO/W.PET/CERES
1 CREAM (GRAM) TOPICAL DAILY
COMMUNITY
Start: 2021-12-02 | End: 2022-01-18

## 2022-01-17 ENCOUNTER — APPOINTMENT (OUTPATIENT)
Dept: PREADMISSION TESTING | Facility: HOSPITAL | Age: 52
End: 2022-01-17

## 2022-01-17 ENCOUNTER — LAB (OUTPATIENT)
Dept: LAB | Facility: HOSPITAL | Age: 52
End: 2022-01-17

## 2022-01-17 DIAGNOSIS — N62 MACROMASTIA: ICD-10-CM

## 2022-01-18 ENCOUNTER — LAB (OUTPATIENT)
Dept: LAB | Facility: HOSPITAL | Age: 52
End: 2022-01-18

## 2022-01-18 ENCOUNTER — PRE-ADMISSION TESTING (OUTPATIENT)
Dept: PREADMISSION TESTING | Facility: HOSPITAL | Age: 52
End: 2022-01-18

## 2022-01-18 VITALS — WEIGHT: 203.48 LBS | HEIGHT: 61 IN | BODY MASS INDEX: 38.42 KG/M2

## 2022-01-18 DIAGNOSIS — Z01.818 PREOP TESTING: Primary | ICD-10-CM

## 2022-01-18 LAB
ANION GAP SERPL CALCULATED.3IONS-SCNC: 11.7 MMOL/L (ref 5–15)
BUN SERPL-MCNC: 13 MG/DL (ref 6–20)
BUN/CREAT SERPL: 18.1 (ref 7–25)
CALCIUM SPEC-SCNC: 10.1 MG/DL (ref 8.6–10.5)
CHLORIDE SERPL-SCNC: 98 MMOL/L (ref 98–107)
CO2 SERPL-SCNC: 28.3 MMOL/L (ref 22–29)
CREAT SERPL-MCNC: 0.72 MG/DL (ref 0.57–1)
GFR SERPL CREATININE-BSD FRML MDRD: 85 ML/MIN/1.73
GLUCOSE SERPL-MCNC: 121 MG/DL (ref 65–99)
POTASSIUM SERPL-SCNC: 4.2 MMOL/L (ref 3.5–5.2)
QT INTERVAL: 369 MS
SODIUM SERPL-SCNC: 138 MMOL/L (ref 136–145)

## 2022-01-18 PROCEDURE — U0004 COV-19 TEST NON-CDC HGH THRU: HCPCS

## 2022-01-18 PROCEDURE — 36415 COLL VENOUS BLD VENIPUNCTURE: CPT

## 2022-01-18 PROCEDURE — 93005 ELECTROCARDIOGRAM TRACING: CPT

## 2022-01-18 PROCEDURE — 80048 BASIC METABOLIC PNL TOTAL CA: CPT

## 2022-01-18 NOTE — PROGRESS NOTES
"Chief Complaint  No chief complaint on file.    Subjective          History of Present Illness  Carlota Rboin is a 51 y.o. female who presents to [facility] as follow up for breast reduction on 1/21/22. Scars are red and tender but doing well. Has trouble get comfortable at night.               Allergies: Pravastatin sodium, Hydrocodone-acetaminophen, Lisinopril, and Pollen extract  Allergies Reconciled.    Review of Systems     Objective     /98 (Patient Position: Sitting)   Pulse 101   Temp 96.2 °F (35.7 °C)   Ht 152.4 cm (60\")   SpO2 98%   BMI 39.35 kg/m²     Body mass index is 39.35 kg/m².    Physical Exam    Incisions healing with edges well approximated, breast soft, good symmetry, resolving expected ecchymosis and edema, nipples viable                                                      Result Review :                Assessment and Plan      Diagnoses and all orders for this visit:    1. Postoperative follow-up (Primary)             Plan:  May wear bra, no heavy lifting, limit use of upper body, may resume driving if not taking pain medications, follow up one month  Follow Up     No follow-ups on file.    Patient was given instructions and counseling regarding her condition. Please see specific information pulled into the AVS if appropriate.     Abiola Sexton, APRN  01/28/2022      "

## 2022-01-18 NOTE — DISCHARGE INSTRUCTIONS
IMPORTANT INSTRUCTIONS - PRE-ADMISSION TESTING  1. DO NOT EAT OR CHEW anything after midnight the night before your procedure.    2. You may have CLEAR liquids up to __2____ hours prior to ARRIVAL time.   3. Take the following medications the morning of your procedure with JUST A SIP OF WATER:          TYLENOL PRN, WELLBUTRIN, TAGAMENT, NORVASC, WELLBUTRIN, CYMBALTA, ESTRADIOL, IRON, VESICARE AND PROTONIX    4. DO NOT BRING your medications to the hospital with you, UNLESS something has changed since your PRE-Admission Testing appointment.  5. Hold all vitamins, supplements, and NSAIDS (Non- steroidal anti-inflammatory meds) for one week prior to surgery (you MAY take Tylenol or Acetaminophen). STOP 1-18-22  6. If you are diabetic, check your blood sugar the morning of your procedure. If it is less than 70 or if you are feeling symptomatic, call the following number for further instructions: [phone].  7. Use your inhalers/nebulizers as usual, the morning of your procedure. BRING YOUR INHALERS with you.   8. Bring your CPAP or BIPAP to hospital, ONLY IF YOU WILL BE SPENDING THE NIGHT.   9. Make sure you have a ride home and have someone who will stay with you the day of your procedure after you go home.  10. If you have any questions, please call your Pre-Admission Testing Nurse, ___MACI at [phone]_.   11. Per anesthesia request, do not smoke for 24 hours before your procedure or as instructed by your surgeon.      SURGERY 1-21-22 [facility] PARKING [address]   WILL CALL 24-48 HR PRIOR TO SURGERY WITH ARRIVAL TIME  USE SURGICAL SOAP 1 TIME AS INSTRUCTED   FOLLOW SURGEON'S INSTRUCTIONS GIVEN AT THE OFFICE      PREOPERATIVE (BEFORE SURGERY)              BATHING INSTRUCTIONS  Instructions:   • You will need to shower  separate times utilizing the soap provided; at the times indicated   below:     -1-20-22 THUR PM  1-21-22 FRI AM     • Wash your hair and face with normal shampoo and soap, rinse it  well before using the surgical soap.     • In the shower, wet the skin completely with water from your neck to your feet. Apply the cleanser to your   body ONLY FROM THE NECK TO YOUR FEET.    • Do NOT USE THE CLEANSER ON YOUR FACE, HEAD, OR GENITAL (PRIVATE) AREAS.   Keep it out of your eyes, ears, and mouth because of the risk of injury to those areas.     • Scrub with a clean washcloth for each bath utilizing the soap provided from the top of your body to the   bottom starting at the neck area.     • Pay close attention to your armpits, groin area, and the site of surgery.     • Wash your body gently for 5 minutes. Stand outside the stream or turn off the water while scrubbing your   body. Do NOT wash with your regular soap after the surgical cleanser is used.     • RINSE THE CLEANSER OFF COMPLETELY with plenty of water. Rinse the area again thoroughly.     • Dry off with a clean towel. The surgical soap can cause dryness; however do NOT APPLY LOTION,   CREAM, POWDER, and/or DEODORANT AFTER SHOWERING.    • Be sure to where clean clothes after showering.     • Ensure CLEAN BED LINENS AFTER FIRST wash with the surgical soap.     • NO PETS ALLOWED IN THE BED with you after utilizing the surgical soap.  ••••••Clear Liquid Diet        Find out when you need to start a clear liquid diet.   Think of “clear liquids” as anything you could read a newspaper through. This includes things like water, broth, sports drinks, or tea WITHOUT any kind of milk or cream.           Once you are told to start a clear liquid diet, only drink these things until 2 hours before arrival to the hospital or when the hospital says to stop. Total volume limitation: 8 oz.       Clear liquids you CAN drink:   ; Water   ; Clear broth: beef, chicken, vegetable, or bone broth with nothing in it   ; Gatorade   ; Lemonade or Ruddy-aid   ; Soda   ; Tea, coffee (NO cream or honey)   ; Jell-O (without fruit)   ; Popsicles (without fruit or cream)    ; Kinyarwanda ices   ; Juice without pulp: apple, white, grape   ; You may use salt, pepper, and sugar   NO RED OR NO NOODLES    Do NOT drink:   ; Milk or cream   ; Soy milk, almond milk, coconut milk, or other non-dairy drinks and   creamers   ; Milkshakes or smoothies   ; Tomato juice   ; Orange juice   ; Grapefruit juice   ; Cream soups or any other than broth         Clear Liquid Diet:  ? Do NOT eat any solid food.  ? Do NOT eat or suck on mints or candy.  ? Do NOT chew gum.  ? Do NOT drink thick liquids like milk or juice with pulp in it.  ? Do NOT add milk, cream, or anything like soy milk or almond milk to coffee or tea.

## 2022-01-19 LAB — SARS-COV-2 RNA PNL SPEC NAA+PROBE: NOT DETECTED

## 2022-01-20 ENCOUNTER — ANESTHESIA EVENT (OUTPATIENT)
Dept: PERIOP | Facility: HOSPITAL | Age: 52
End: 2022-01-20

## 2022-01-21 ENCOUNTER — ANESTHESIA (OUTPATIENT)
Dept: PERIOP | Facility: HOSPITAL | Age: 52
End: 2022-01-21

## 2022-01-21 ENCOUNTER — HOSPITAL ENCOUNTER (OUTPATIENT)
Facility: HOSPITAL | Age: 52
Setting detail: HOSPITAL OUTPATIENT SURGERY
Discharge: HOME | End: 2022-01-21
Attending: SURGERY | Admitting: SURGERY

## 2022-01-21 VITALS
BODY MASS INDEX: 39.56 KG/M2 | WEIGHT: 201.5 LBS | OXYGEN SATURATION: 94 % | SYSTOLIC BLOOD PRESSURE: 134 MMHG | HEART RATE: 86 BPM | HEIGHT: 60 IN | DIASTOLIC BLOOD PRESSURE: 84 MMHG | RESPIRATION RATE: 16 BRPM | TEMPERATURE: 97.9 F

## 2022-01-21 DIAGNOSIS — N62 MACROMASTIA: Primary | ICD-10-CM

## 2022-01-21 LAB
GLUCOSE BLDC GLUCOMTR-MCNC: 141 MG/DL (ref 70–99)
GLUCOSE BLDC GLUCOMTR-MCNC: 153 MG/DL (ref 70–99)

## 2022-01-21 PROCEDURE — 25010000002 MIDAZOLAM PER 1 MG: Performed by: ANESTHESIOLOGY

## 2022-01-21 PROCEDURE — 25010000002 HYDROMORPHONE 1 MG/ML SOLUTION: Performed by: NURSE ANESTHETIST, CERTIFIED REGISTERED

## 2022-01-21 PROCEDURE — 82962 GLUCOSE BLOOD TEST: CPT

## 2022-01-21 PROCEDURE — 25010000002 ONDANSETRON PER 1 MG: Performed by: NURSE ANESTHETIST, CERTIFIED REGISTERED

## 2022-01-21 PROCEDURE — 0 CEFAZOLIN IN DEXTROSE 2-4 GM/100ML-% SOLUTION: Performed by: SURGERY

## 2022-01-21 PROCEDURE — 19318 BREAST REDUCTION: CPT | Performed by: SURGERY

## 2022-01-21 PROCEDURE — 88305 TISSUE EXAM BY PATHOLOGIST: CPT | Performed by: SURGERY

## 2022-01-21 PROCEDURE — 25010000002 METOCLOPRAMIDE PER 10 MG: Performed by: ANESTHESIOLOGY

## 2022-01-21 PROCEDURE — 25010000002 PROPOFOL 10 MG/ML EMULSION: Performed by: NURSE ANESTHETIST, CERTIFIED REGISTERED

## 2022-01-21 PROCEDURE — 25010000002 FENTANYL CITRATE (PF) 50 MCG/ML SOLUTION: Performed by: NURSE ANESTHETIST, CERTIFIED REGISTERED

## 2022-01-21 RX ORDER — OXYCODONE HYDROCHLORIDE 5 MG/1
5 TABLET ORAL
Status: DISCONTINUED | OUTPATIENT
Start: 2022-01-21 | End: 2022-01-21 | Stop reason: HOSPADM

## 2022-01-21 RX ORDER — SCOLOPAMINE TRANSDERMAL SYSTEM 1 MG/1
1 PATCH, EXTENDED RELEASE TRANSDERMAL CONTINUOUS
Status: DISCONTINUED | OUTPATIENT
Start: 2022-01-21 | End: 2022-01-21 | Stop reason: HOSPADM

## 2022-01-21 RX ORDER — ONDANSETRON 2 MG/ML
INJECTION INTRAMUSCULAR; INTRAVENOUS AS NEEDED
Status: DISCONTINUED | OUTPATIENT
Start: 2022-01-21 | End: 2022-01-21 | Stop reason: SURG

## 2022-01-21 RX ORDER — ACETAMINOPHEN 500 MG
TABLET ORAL
Status: DISCONTINUED
Start: 2022-01-21 | End: 2022-01-21 | Stop reason: HOSPADM

## 2022-01-21 RX ORDER — PROPOFOL 10 MG/ML
VIAL (ML) INTRAVENOUS AS NEEDED
Status: DISCONTINUED | OUTPATIENT
Start: 2022-01-21 | End: 2022-01-21 | Stop reason: SURG

## 2022-01-21 RX ORDER — MEPERIDINE HYDROCHLORIDE 25 MG/ML
12.5 INJECTION INTRAMUSCULAR; INTRAVENOUS; SUBCUTANEOUS
Status: DISCONTINUED | OUTPATIENT
Start: 2022-01-21 | End: 2022-01-21 | Stop reason: HOSPADM

## 2022-01-21 RX ORDER — SODIUM CHLORIDE, SODIUM LACTATE, POTASSIUM CHLORIDE, CALCIUM CHLORIDE 600; 310; 30; 20 MG/100ML; MG/100ML; MG/100ML; MG/100ML
9 INJECTION, SOLUTION INTRAVENOUS CONTINUOUS PRN
Status: DISCONTINUED | OUTPATIENT
Start: 2022-01-21 | End: 2022-01-21 | Stop reason: HOSPADM

## 2022-01-21 RX ORDER — PROMETHAZINE HYDROCHLORIDE 12.5 MG/1
25 TABLET ORAL ONCE AS NEEDED
Status: DISCONTINUED | OUTPATIENT
Start: 2022-01-21 | End: 2022-01-21 | Stop reason: HOSPADM

## 2022-01-21 RX ORDER — CEPHALEXIN 500 MG/1
500 CAPSULE ORAL 4 TIMES DAILY
Qty: 12 CAPSULE | Refills: 0 | Status: SHIPPED | OUTPATIENT
Start: 2022-01-21 | End: 2022-01-24

## 2022-01-21 RX ORDER — PROMETHAZINE HYDROCHLORIDE 25 MG/1
25 SUPPOSITORY RECTAL ONCE AS NEEDED
Status: DISCONTINUED | OUTPATIENT
Start: 2022-01-21 | End: 2022-01-21 | Stop reason: HOSPADM

## 2022-01-21 RX ORDER — CEFAZOLIN SODIUM 2 G/100ML
2 INJECTION, SOLUTION INTRAVENOUS ONCE
Status: COMPLETED | OUTPATIENT
Start: 2022-01-21 | End: 2022-01-21

## 2022-01-21 RX ORDER — LIDOCAINE HYDROCHLORIDE 20 MG/ML
INJECTION, SOLUTION INFILTRATION; PERINEURAL AS NEEDED
Status: DISCONTINUED | OUTPATIENT
Start: 2022-01-21 | End: 2022-01-21 | Stop reason: SURG

## 2022-01-21 RX ORDER — ONDANSETRON 2 MG/ML
4 INJECTION INTRAMUSCULAR; INTRAVENOUS ONCE AS NEEDED
Status: DISCONTINUED | OUTPATIENT
Start: 2022-01-21 | End: 2022-01-21 | Stop reason: HOSPADM

## 2022-01-21 RX ORDER — ACETAMINOPHEN 500 MG
1000 TABLET ORAL ONCE
Status: COMPLETED | OUTPATIENT
Start: 2022-01-21 | End: 2022-01-21

## 2022-01-21 RX ORDER — METOCLOPRAMIDE HYDROCHLORIDE 5 MG/ML
10 INJECTION INTRAMUSCULAR; INTRAVENOUS ONCE AS NEEDED
Status: COMPLETED | OUTPATIENT
Start: 2022-01-21 | End: 2022-01-21

## 2022-01-21 RX ORDER — FAMOTIDINE 10 MG/ML
20 INJECTION, SOLUTION INTRAVENOUS
Status: COMPLETED | OUTPATIENT
Start: 2022-01-21 | End: 2022-01-21

## 2022-01-21 RX ORDER — OXYCODONE HYDROCHLORIDE AND ACETAMINOPHEN 5; 325 MG/1; MG/1
1 TABLET ORAL EVERY 6 HOURS PRN
Qty: 30 TABLET | Refills: 0 | Status: SHIPPED | OUTPATIENT
Start: 2022-01-21 | End: 2022-09-27

## 2022-01-21 RX ORDER — DEXMEDETOMIDINE HYDROCHLORIDE 100 UG/ML
INJECTION, SOLUTION INTRAVENOUS AS NEEDED
Status: DISCONTINUED | OUTPATIENT
Start: 2022-01-21 | End: 2022-01-21 | Stop reason: SURG

## 2022-01-21 RX ORDER — FENTANYL CITRATE 50 UG/ML
INJECTION, SOLUTION INTRAMUSCULAR; INTRAVENOUS AS NEEDED
Status: DISCONTINUED | OUTPATIENT
Start: 2022-01-21 | End: 2022-01-21 | Stop reason: SURG

## 2022-01-21 RX ORDER — PROMETHAZINE HYDROCHLORIDE 12.5 MG/1
12.5 TABLET ORAL EVERY 6 HOURS PRN
Qty: 20 TABLET | Refills: 0 | Status: SHIPPED | OUTPATIENT
Start: 2022-01-21

## 2022-01-21 RX ORDER — MIDAZOLAM HYDROCHLORIDE 1 MG/ML
2 INJECTION INTRAMUSCULAR; INTRAVENOUS ONCE
Status: COMPLETED | OUTPATIENT
Start: 2022-01-21 | End: 2022-01-21

## 2022-01-21 RX ORDER — ROCURONIUM BROMIDE 10 MG/ML
INJECTION, SOLUTION INTRAVENOUS AS NEEDED
Status: DISCONTINUED | OUTPATIENT
Start: 2022-01-21 | End: 2022-01-21 | Stop reason: SURG

## 2022-01-21 RX ORDER — PHENYLEPHRINE HCL IN 0.9% NACL 1 MG/10 ML
SYRINGE (ML) INTRAVENOUS AS NEEDED
Status: DISCONTINUED | OUTPATIENT
Start: 2022-01-21 | End: 2022-01-21 | Stop reason: SURG

## 2022-01-21 RX ADMIN — SUGAMMADEX 200 MG: 100 INJECTION, SOLUTION INTRAVENOUS at 10:29

## 2022-01-21 RX ADMIN — SCOPALAMINE 1 PATCH: 1 PATCH, EXTENDED RELEASE TRANSDERMAL at 06:51

## 2022-01-21 RX ADMIN — Medication 100 MCG: at 08:07

## 2022-01-21 RX ADMIN — Medication 100 MCG: at 07:48

## 2022-01-21 RX ADMIN — ROCURONIUM BROMIDE 10 MG: 10 INJECTION INTRAVENOUS at 08:36

## 2022-01-21 RX ADMIN — METOCLOPRAMIDE HYDROCHLORIDE 10 MG: 5 INJECTION INTRAMUSCULAR; INTRAVENOUS at 06:50

## 2022-01-21 RX ADMIN — Medication 100 MCG: at 08:26

## 2022-01-21 RX ADMIN — DEXMEDETOMIDINE 50 MCG: 100 INJECTION, SOLUTION, CONCENTRATE INTRAVENOUS at 07:21

## 2022-01-21 RX ADMIN — SODIUM CHLORIDE, POTASSIUM CHLORIDE, SODIUM LACTATE AND CALCIUM CHLORIDE: 600; 310; 30; 20 INJECTION, SOLUTION INTRAVENOUS at 08:22

## 2022-01-21 RX ADMIN — OXYCODONE HYDROCHLORIDE 5 MG: 5 TABLET ORAL at 11:04

## 2022-01-21 RX ADMIN — ROCURONIUM BROMIDE 50 MG: 10 INJECTION INTRAVENOUS at 07:24

## 2022-01-21 RX ADMIN — MIDAZOLAM 2 MG: 1 INJECTION INTRAMUSCULAR; INTRAVENOUS at 07:16

## 2022-01-21 RX ADMIN — ROCURONIUM BROMIDE 10 MG: 10 INJECTION INTRAVENOUS at 09:03

## 2022-01-21 RX ADMIN — FENTANYL CITRATE 100 MCG: 50 INJECTION, SOLUTION INTRAMUSCULAR; INTRAVENOUS at 07:24

## 2022-01-21 RX ADMIN — ROCURONIUM BROMIDE 10 MG: 10 INJECTION INTRAVENOUS at 09:43

## 2022-01-21 RX ADMIN — FENTANYL CITRATE 50 MCG: 50 INJECTION, SOLUTION INTRAMUSCULAR; INTRAVENOUS at 10:07

## 2022-01-21 RX ADMIN — SODIUM CHLORIDE, POTASSIUM CHLORIDE, SODIUM LACTATE AND CALCIUM CHLORIDE 9 ML/HR: 600; 310; 30; 20 INJECTION, SOLUTION INTRAVENOUS at 06:50

## 2022-01-21 RX ADMIN — CEFAZOLIN SODIUM 2 G: 2 INJECTION, SOLUTION INTRAVENOUS at 07:21

## 2022-01-21 RX ADMIN — PROPOFOL 200 MCG/KG/MIN: 10 INJECTION, EMULSION INTRAVENOUS at 07:24

## 2022-01-21 RX ADMIN — FENTANYL CITRATE 50 MCG: 50 INJECTION, SOLUTION INTRAMUSCULAR; INTRAVENOUS at 10:25

## 2022-01-21 RX ADMIN — ACETAMINOPHEN 1000 MG: 500 TABLET ORAL at 07:15

## 2022-01-21 RX ADMIN — HYDROMORPHONE HYDROCHLORIDE 0.25 MG: 1 INJECTION, SOLUTION INTRAMUSCULAR; INTRAVENOUS; SUBCUTANEOUS at 11:04

## 2022-01-21 RX ADMIN — LIDOCAINE HYDROCHLORIDE 100 MG: 20 INJECTION, SOLUTION INFILTRATION; PERINEURAL at 07:24

## 2022-01-21 RX ADMIN — PROPOFOL 200 MG: 10 INJECTION, EMULSION INTRAVENOUS at 07:24

## 2022-01-21 RX ADMIN — ONDANSETRON 4 MG: 2 INJECTION INTRAMUSCULAR; INTRAVENOUS at 10:05

## 2022-01-21 RX ADMIN — FAMOTIDINE 20 MG: 10 INJECTION INTRAVENOUS at 06:50

## 2022-01-21 RX ADMIN — ROCURONIUM BROMIDE 20 MG: 10 INJECTION INTRAVENOUS at 07:52

## 2022-01-21 NOTE — ANESTHESIA PREPROCEDURE EVALUATION
Anesthesia Evaluation     Patient summary reviewed and Nursing notes reviewed                Airway   Mallampati: II  TM distance: >3 FB  Neck ROM: full  No difficulty expected  Dental      Pulmonary - normal exam    breath sounds clear to auscultation  (+) asthma,sleep apnea,   Cardiovascular - normal exam    Rhythm: regular  Rate: normal    (+) hypertension, dysrhythmias, hyperlipidemia,       Neuro/Psych  (+) headaches, psychiatric history,     GI/Hepatic/Renal/Endo    (+) morbid obesity, GERD,  diabetes mellitus,     Musculoskeletal (-) negative ROS    Abdominal    Substance History - negative use     OB/GYN negative ob/gyn ROS         Other                        Anesthesia Plan    ASA 3     general   (Patient reports that she is slow to waken after anesthesia.)  intravenous induction     Anesthetic plan, all risks, benefits, and alternatives have been provided, discussed and informed consent has been obtained with: patient.        CODE STATUS:

## 2022-01-21 NOTE — DISCHARGE INSTRUCTIONS
Ok for regular diet  Do not drive for the next 2 weeks  Ok to shower tomorrow. It is ok to wet the breat   Do not exercise for the next 2 weeks.   Sleep in upright position   No bra for 1 week. After that, wear comfortable soft bra  Ok to move arms slowly to the shoulder level (brushing hair movement)

## 2022-01-21 NOTE — ANESTHESIA POSTPROCEDURE EVALUATION
Patient: Carlota Robin    Procedure Summary     Date: 01/21/22 Room / Location: [facility] OR  / [facility]    Anesthesia Start: 0721 Anesthesia Stop: 1048    Procedure: BREAST REDUCTION (Bilateral Chest) Diagnosis:       Macromastia      (Macromastia [N62])    Surgeons: Zeeshan Bone MD Provider: Johnny Nichols MD    Anesthesia Type: general ASA Status: 3          Anesthesia Type: general    Vitals  Vitals Value Taken Time   /83 01/21/22 1100   Temp 36.1 °C (97 °F) 01/21/22 1042   Pulse 88 01/21/22 1100   Resp 18 01/21/22 1100   SpO2 91 % 01/21/22 1100           Post Anesthesia Care and Evaluation    Patient location during evaluation: bedside  Patient participation: complete - patient participated  Level of consciousness: awake  Pain management: adequate  Airway patency: patent  Anesthetic complications: No anesthetic complications  PONV Status: none  Cardiovascular status: acceptable  Respiratory status: acceptable  Hydration status: acceptable    Comments: An Anesthesiologist personally participated in the most demanding procedures (including induction and emergence if applicable) in the anesthesia plan, monitored the course of anesthesia administration at frequent intervals and remained physically present and available for immediate diagnosis and treatment of emergencies.

## 2022-01-21 NOTE — OP NOTE
Plastic Surgery Op Note    BREAST REDUCTION BILATERAL    Carlota Robin  1/21/2022    Pre-op Diagnosis:   Macromastia [N62]    Post-Op Diagnosis Codes:     * Macromastia [N62]    Anesthesia: General    Staff:   Circulator: Quita Tello RN  Scrub Person: Charmaine Paz  Assistant: Rosmery Benitez  Other: Teagan Skinner RN    Surgeon: Dr Bone    Estimated Blood Loss: 100ml    Specimens:   Order Name Source Comment Collection Info Order Time   TISSUE PATHOLOGY EXAM Breast, Right  Collected By: Zeeshan Bone MD 1/21/2022  8:34 AM     Release to patient   Immediate              Drains: * No LDAs found *    Findings:     Breast reduction:   R: 880g  L: 934g    Complications: none       Date: 1/21/2022  Time: 10:50 EST  After risks and benefits were discussed with patient and inform consent was signed, patient was taken to the procedure room and positioned supine. She was then anesthetized using general anesthesia and a time out was performed confirming patient’s name, location of the surgery and procedure. The surgical team was introduced.   A wise pattern was used for the  mastopexy and the landmarks were marked in the pre op area. The nipple was marked at 22 cm from the sternal notch at the meridian of the breasts.  Both breasts were done step by step at the same time. Using a 52mm cookie cutter, the nipple was marked and incised. A keyhole wire ring was then used to outline the basic wise pattern with 6-cm lamps inferiorly. This was then carefully checked for symmetry and appeared to be satisfactory. All marks were then completed and lightly incised on both breasts. The basim-nipple site was de-epithelialized superiorly and then the inferior pedicle was de-epithelialized using cutting cautery. After this had been completed, cutting cautery was used to carry down an incision along the inferior medial and lateral aspects of the breast. This was taken down to the prepectoral fashion dissected  for short distance superiorly, and then additional dissection was used to mobilize under  portion of the breast tissues to the lateral edge of the pectoral muscle. There was very little bleeding with this procedure. After this had been completed, attention was directed to the lateral side, and the inferior incision was made and taken down to the serratus. Cautery dissection was then used to carry this up superiorly over the lateral edge of the pectoral muscle to communicate with the previous pocket. After this had been completed, cutting cautery was used to cut around the inferior pedicle completely freeing the superior breast from the inferior breast. Hemostasis was obtained with electrocautery. After this had been completed, cutting cautery was used to cut along the lateral aspect of the areola with a small wedge resection for nipple repositioning. After this had been completed, final hemostasis obtained, and the wound was irrigated and a tagging suture placed to approximate the tissues in the center of the breast. The breast tissue was cleared and the nipple appeared good. Then, the breast tissue was closed and the patient was placed in a near upright position, and symmetry appeared good. The wounds were then closed with interrupted 3-0 Vycril on the deep dermis and running intradermal 3-0 Monocryl on the skin, packing sutures and staples were removed as they were approached. The nipple was sutured with running intradermal 4-0 Monocryl. Vascularity appeared good throughout. The patient tolerated the procedure well. All counts were correct. Estimated blood loss was less than 150 mL, and she was sent to recovery room in good condition.       Zeeshan Bone MD  01/21/22  10:50 EST

## 2022-01-21 NOTE — INTERVAL H&P NOTE
H&P reviewed. The patient was examined and there are no changes to the H&P.    Patient is not tachycardic  Respirations are non elaborated  Vitals:    01/21/22 0633   BP: 139/93   Pulse: 105   Resp: 20   Temp: 98.8 °F (37.1 °C)   SpO2: 95%     Risks and benefits reminded to patient who agrees to proceed

## 2022-01-25 LAB
CYTO UR: NORMAL
LAB AP CASE REPORT: NORMAL
LAB AP CLINICAL INFORMATION: NORMAL
PATH REPORT.FINAL DX SPEC: NORMAL
PATH REPORT.GROSS SPEC: NORMAL

## 2022-01-27 RX ORDER — LISINOPRIL 5 MG/1
TABLET ORAL
Qty: 30 TABLET | Refills: 4 | Status: SHIPPED | OUTPATIENT
Start: 2022-01-27 | End: 2022-09-27 | Stop reason: SDUPTHER

## 2022-01-27 RX ORDER — DULOXETIN HYDROCHLORIDE 60 MG/1
CAPSULE, DELAYED RELEASE ORAL
Qty: 30 CAPSULE | Refills: 4 | Status: SHIPPED | OUTPATIENT
Start: 2022-01-27 | End: 2022-04-05 | Stop reason: SDUPTHER

## 2022-01-28 ENCOUNTER — OFFICE VISIT (OUTPATIENT)
Dept: PLASTIC SURGERY | Facility: CLINIC | Age: 52
End: 2022-01-28

## 2022-01-28 VITALS
BODY MASS INDEX: 39.35 KG/M2 | SYSTOLIC BLOOD PRESSURE: 145 MMHG | HEIGHT: 60 IN | OXYGEN SATURATION: 98 % | DIASTOLIC BLOOD PRESSURE: 98 MMHG | HEART RATE: 101 BPM | TEMPERATURE: 96.2 F

## 2022-01-28 DIAGNOSIS — Z09 POSTOPERATIVE FOLLOW-UP: Primary | ICD-10-CM

## 2022-01-28 PROCEDURE — 99024 POSTOP FOLLOW-UP VISIT: CPT | Performed by: NURSE PRACTITIONER

## 2022-03-02 ENCOUNTER — OFFICE VISIT (OUTPATIENT)
Dept: PLASTIC SURGERY | Facility: CLINIC | Age: 52
End: 2022-03-02

## 2022-03-02 VITALS
HEART RATE: 95 BPM | SYSTOLIC BLOOD PRESSURE: 136 MMHG | OXYGEN SATURATION: 100 % | DIASTOLIC BLOOD PRESSURE: 87 MMHG | BODY MASS INDEX: 39.11 KG/M2 | WEIGHT: 199.2 LBS | HEIGHT: 60 IN | TEMPERATURE: 98.3 F

## 2022-03-02 DIAGNOSIS — Z09 POSTOPERATIVE FOLLOW-UP: Primary | ICD-10-CM

## 2022-03-02 PROCEDURE — 99024 POSTOP FOLLOW-UP VISIT: CPT | Performed by: SURGERY

## 2022-03-29 ENCOUNTER — LAB (OUTPATIENT)
Dept: LAB | Facility: HOSPITAL | Age: 52
End: 2022-03-29

## 2022-03-29 DIAGNOSIS — E53.8 VITAMIN B 12 DEFICIENCY: ICD-10-CM

## 2022-03-29 DIAGNOSIS — D50.9 IRON DEFICIENCY ANEMIA, UNSPECIFIED IRON DEFICIENCY ANEMIA TYPE: ICD-10-CM

## 2022-03-29 DIAGNOSIS — E55.9 VITAMIN D DEFICIENCY: ICD-10-CM

## 2022-03-29 DIAGNOSIS — E11.65 TYPE 2 DIABETES MELLITUS WITH HYPERGLYCEMIA, WITHOUT LONG-TERM CURRENT USE OF INSULIN: ICD-10-CM

## 2022-03-29 DIAGNOSIS — E03.9 ACQUIRED HYPOTHYROIDISM: ICD-10-CM

## 2022-03-29 DIAGNOSIS — E78.5 HYPERLIPIDEMIA, UNSPECIFIED HYPERLIPIDEMIA TYPE: ICD-10-CM

## 2022-03-29 LAB
25(OH)D3 SERPL-MCNC: 61.7 NG/ML (ref 30–100)
ALBUMIN SERPL-MCNC: 4.8 G/DL (ref 3.5–5.2)
ALBUMIN UR-MCNC: 4.4 MG/DL
ALBUMIN/GLOB SERPL: 1.5 G/DL
ALP SERPL-CCNC: 84 U/L (ref 39–117)
ALT SERPL W P-5'-P-CCNC: 49 U/L (ref 1–33)
ANION GAP SERPL CALCULATED.3IONS-SCNC: 14 MMOL/L (ref 5–15)
AST SERPL-CCNC: 36 U/L (ref 1–32)
BASOPHILS # BLD AUTO: 0.05 10*3/MM3 (ref 0–0.2)
BASOPHILS NFR BLD AUTO: 0.7 % (ref 0–1.5)
BILIRUB SERPL-MCNC: 0.2 MG/DL (ref 0–1.2)
BUN SERPL-MCNC: 15 MG/DL (ref 6–20)
BUN/CREAT SERPL: 15.3 (ref 7–25)
CALCIUM SPEC-SCNC: 9.7 MG/DL (ref 8.6–10.5)
CHLORIDE SERPL-SCNC: 102 MMOL/L (ref 98–107)
CHOLEST SERPL-MCNC: 259 MG/DL (ref 0–200)
CO2 SERPL-SCNC: 25 MMOL/L (ref 22–29)
CREAT SERPL-MCNC: 0.98 MG/DL (ref 0.57–1)
CREAT UR-MCNC: 99 MG/DL
DEPRECATED RDW RBC AUTO: 41 FL (ref 37–54)
EGFRCR SERPLBLD CKD-EPI 2021: 69.6 ML/MIN/1.73
EOSINOPHIL # BLD AUTO: 0.16 10*3/MM3 (ref 0–0.4)
EOSINOPHIL NFR BLD AUTO: 2.2 % (ref 0.3–6.2)
ERYTHROCYTE [DISTWIDTH] IN BLOOD BY AUTOMATED COUNT: 13.3 % (ref 12.3–15.4)
FOLATE SERPL-MCNC: >20 NG/ML (ref 4.78–24.2)
GLOBULIN UR ELPH-MCNC: 3.2 GM/DL
GLUCOSE SERPL-MCNC: 108 MG/DL (ref 65–99)
HBA1C MFR BLD: 7.2 % (ref 4.8–5.6)
HCT VFR BLD AUTO: 46.4 % (ref 34–46.6)
HDLC SERPL QL: 6.17
HDLC SERPL-MCNC: 42 MG/DL (ref 40–60)
HGB BLD-MCNC: 15.2 G/DL (ref 12–15.9)
IMM GRANULOCYTES # BLD AUTO: 0.02 10*3/MM3 (ref 0–0.05)
IMM GRANULOCYTES NFR BLD AUTO: 0.3 % (ref 0–0.5)
IRON 24H UR-MRATE: 49 MCG/DL (ref 37–145)
IRON SATN MFR SERPL: 9 % (ref 20–50)
LDLC SERPL CALC-MCNC: 155 MG/DL (ref 0–100)
LYMPHOCYTES # BLD AUTO: 2.78 10*3/MM3 (ref 0.7–3.1)
LYMPHOCYTES NFR BLD AUTO: 37.4 % (ref 19.6–45.3)
MCH RBC QN AUTO: 27.5 PG (ref 26.6–33)
MCHC RBC AUTO-ENTMCNC: 32.8 G/DL (ref 31.5–35.7)
MCV RBC AUTO: 84.1 FL (ref 79–97)
MICROALBUMIN/CREAT UR: 44.4 MG/G
MONOCYTES # BLD AUTO: 0.7 10*3/MM3 (ref 0.1–0.9)
MONOCYTES NFR BLD AUTO: 9.4 % (ref 5–12)
NEUTROPHILS NFR BLD AUTO: 3.73 10*3/MM3 (ref 1.7–7)
NEUTROPHILS NFR BLD AUTO: 50 % (ref 42.7–76)
NRBC BLD AUTO-RTO: 0 /100 WBC (ref 0–0.2)
PLATELET # BLD AUTO: 339 10*3/MM3 (ref 140–450)
PMV BLD AUTO: 10.4 FL (ref 6–12)
POTASSIUM SERPL-SCNC: 3.6 MMOL/L (ref 3.5–5.2)
PROT SERPL-MCNC: 8 G/DL (ref 6–8.5)
RBC # BLD AUTO: 5.52 10*6/MM3 (ref 3.77–5.28)
SODIUM SERPL-SCNC: 141 MMOL/L (ref 136–145)
TIBC SERPL-MCNC: 523 MCG/DL (ref 298–536)
TRANSFERRIN SERPL-MCNC: 351 MG/DL (ref 200–360)
TRIGL SERPL-MCNC: 333 MG/DL (ref 0–150)
TSH SERPL DL<=0.05 MIU/L-ACNC: 2.56 UIU/ML (ref 0.27–4.2)
VIT B12 BLD-MCNC: 1013 PG/ML (ref 211–946)
VLDLC SERPL-MCNC: 62 MG/DL (ref 5–40)
WBC NRBC COR # BLD: 7.44 10*3/MM3 (ref 3.4–10.8)

## 2022-03-29 PROCEDURE — 80053 COMPREHEN METABOLIC PANEL: CPT

## 2022-03-29 PROCEDURE — 84466 ASSAY OF TRANSFERRIN: CPT

## 2022-03-29 PROCEDURE — 83540 ASSAY OF IRON: CPT

## 2022-03-29 PROCEDURE — 85025 COMPLETE CBC W/AUTO DIFF WBC: CPT

## 2022-03-29 PROCEDURE — 83036 HEMOGLOBIN GLYCOSYLATED A1C: CPT

## 2022-03-29 PROCEDURE — 82607 VITAMIN B-12: CPT

## 2022-03-29 PROCEDURE — 82043 UR ALBUMIN QUANTITATIVE: CPT

## 2022-03-29 PROCEDURE — 80061 LIPID PANEL: CPT

## 2022-03-29 PROCEDURE — 82746 ASSAY OF FOLIC ACID SERUM: CPT

## 2022-03-29 PROCEDURE — 84443 ASSAY THYROID STIM HORMONE: CPT

## 2022-03-29 PROCEDURE — 82306 VITAMIN D 25 HYDROXY: CPT

## 2022-03-29 PROCEDURE — 36415 COLL VENOUS BLD VENIPUNCTURE: CPT

## 2022-03-29 PROCEDURE — 82570 ASSAY OF URINE CREATININE: CPT

## 2022-04-05 ENCOUNTER — OFFICE VISIT (OUTPATIENT)
Dept: FAMILY MEDICINE CLINIC | Facility: CLINIC | Age: 52
End: 2022-04-05

## 2022-04-05 VITALS
SYSTOLIC BLOOD PRESSURE: 116 MMHG | DIASTOLIC BLOOD PRESSURE: 82 MMHG | HEIGHT: 60 IN | OXYGEN SATURATION: 95 % | WEIGHT: 186.6 LBS | HEART RATE: 85 BPM | BODY MASS INDEX: 36.63 KG/M2 | TEMPERATURE: 97.4 F | RESPIRATION RATE: 16 BRPM

## 2022-04-05 DIAGNOSIS — E03.9 ACQUIRED HYPOTHYROIDISM: ICD-10-CM

## 2022-04-05 DIAGNOSIS — F41.9 ANXIETY: ICD-10-CM

## 2022-04-05 DIAGNOSIS — D50.9 IRON DEFICIENCY ANEMIA, UNSPECIFIED IRON DEFICIENCY ANEMIA TYPE: Primary | ICD-10-CM

## 2022-04-05 DIAGNOSIS — E11.65 TYPE 2 DIABETES MELLITUS WITH HYPERGLYCEMIA, WITHOUT LONG-TERM CURRENT USE OF INSULIN: ICD-10-CM

## 2022-04-05 DIAGNOSIS — I10 PRIMARY HYPERTENSION: ICD-10-CM

## 2022-04-05 DIAGNOSIS — K21.9 GASTROESOPHAGEAL REFLUX DISEASE WITHOUT ESOPHAGITIS: ICD-10-CM

## 2022-04-05 DIAGNOSIS — M79.7 FIBROMYALGIA: ICD-10-CM

## 2022-04-05 DIAGNOSIS — G47.09 OTHER INSOMNIA: ICD-10-CM

## 2022-04-05 DIAGNOSIS — E78.5 HYPERLIPIDEMIA, UNSPECIFIED HYPERLIPIDEMIA TYPE: ICD-10-CM

## 2022-04-05 DIAGNOSIS — E55.9 VITAMIN D DEFICIENCY: ICD-10-CM

## 2022-04-05 DIAGNOSIS — J30.2 SEASONAL ALLERGIC RHINITIS, UNSPECIFIED TRIGGER: ICD-10-CM

## 2022-04-05 PROCEDURE — 99214 OFFICE O/P EST MOD 30 MIN: CPT | Performed by: NURSE PRACTITIONER

## 2022-04-05 RX ORDER — HYDROCHLOROTHIAZIDE 12.5 MG/1
12.5 TABLET ORAL DAILY
Qty: 30 TABLET | Refills: 5 | Status: SHIPPED | OUTPATIENT
Start: 2022-04-05 | End: 2022-09-27 | Stop reason: SDUPTHER

## 2022-04-05 RX ORDER — ATORVASTATIN CALCIUM 20 MG/1
20 TABLET, FILM COATED ORAL DAILY
Qty: 30 TABLET | Refills: 5 | Status: CANCELLED | OUTPATIENT
Start: 2022-04-05 | End: 2022-10-02

## 2022-04-05 RX ORDER — ERGOCALCIFEROL 1.25 MG/1
50000 CAPSULE ORAL WEEKLY
Qty: 13 CAPSULE | Refills: 1 | Status: SHIPPED | OUTPATIENT
Start: 2022-04-05 | End: 2022-08-30 | Stop reason: SDUPTHER

## 2022-04-05 RX ORDER — LEVOTHYROXINE SODIUM 0.03 MG/1
25 TABLET ORAL DAILY
Qty: 30 TABLET | Refills: 5 | Status: SHIPPED | OUTPATIENT
Start: 2022-04-05 | End: 2022-09-27 | Stop reason: SDUPTHER

## 2022-04-05 RX ORDER — MONTELUKAST SODIUM 10 MG/1
10 TABLET ORAL EVERY EVENING
Qty: 30 TABLET | Refills: 5 | Status: SHIPPED | OUTPATIENT
Start: 2022-04-05 | End: 2022-09-27 | Stop reason: SDUPTHER

## 2022-04-05 RX ORDER — DULOXETIN HYDROCHLORIDE 60 MG/1
60 CAPSULE, DELAYED RELEASE ORAL DAILY
Qty: 30 CAPSULE | Refills: 5 | Status: SHIPPED | OUTPATIENT
Start: 2022-04-05 | End: 2022-09-27 | Stop reason: SDUPTHER

## 2022-04-05 RX ORDER — LISINOPRIL 5 MG/1
5 TABLET ORAL DAILY
Qty: 30 TABLET | Refills: 4 | Status: CANCELLED | OUTPATIENT
Start: 2022-04-05

## 2022-04-05 RX ORDER — MELOXICAM 7.5 MG/1
7.5 TABLET ORAL DAILY
Qty: 30 TABLET | Refills: 5 | Status: SHIPPED | OUTPATIENT
Start: 2022-04-05 | End: 2022-09-27 | Stop reason: SDUPTHER

## 2022-04-05 RX ORDER — PANTOPRAZOLE SODIUM 40 MG/1
40 TABLET, DELAYED RELEASE ORAL DAILY
Qty: 30 TABLET | Refills: 5 | Status: SHIPPED | OUTPATIENT
Start: 2022-04-05 | End: 2022-09-27

## 2022-04-05 RX ORDER — TRAZODONE HYDROCHLORIDE 50 MG/1
50 TABLET ORAL NIGHTLY
Qty: 30 TABLET | Refills: 2 | Status: CANCELLED | OUTPATIENT
Start: 2022-04-05

## 2022-04-05 RX ORDER — AMLODIPINE BESYLATE 10 MG/1
10 TABLET ORAL DAILY
Qty: 30 TABLET | Refills: 5 | Status: SHIPPED | OUTPATIENT
Start: 2022-04-05 | End: 2022-09-27 | Stop reason: SDUPTHER

## 2022-04-05 RX ORDER — IRON POLYSACCHARIDE COMPLEX 150 MG
150 CAPSULE ORAL DAILY
Qty: 90 CAPSULE | Refills: 1 | Status: SHIPPED | OUTPATIENT
Start: 2022-04-05 | End: 2022-09-27 | Stop reason: SDUPTHER

## 2022-04-05 RX ORDER — ARIPIPRAZOLE 5 MG/1
5 TABLET ORAL DAILY
Qty: 30 TABLET | Refills: 5 | Status: SHIPPED | OUTPATIENT
Start: 2022-04-05 | End: 2022-09-27 | Stop reason: SDUPTHER

## 2022-04-05 NOTE — PROGRESS NOTES
Chief Complaint  Abnormal Lab (Discuss results ), Hypertension, and Med Change Request (Metformin-Would like to come off medication)    Subjective          Carlota Robin presents to [facility]  History of Present Illness  She is not taking her Lipitor daily.  She does not take on a daily basis at all.  She has been really watching her diet.  She has lost weight with cutting the sugars and carbs.  She is taking her amlodipine, hydrochlorothiazide daily though she is not taking her lisinopril and has not for the last month.  She is aware that the lisinopril is to help protect the kidneys with diabetes.  She is taking her Metformin but she is wishing to come off the Metformin with her diet changes.  She is taking her vitamin D, iron with vitamin C though her iron levels did not change much.  We will change medication to a different iron tablet.  She does not take her trazodone but when she needs it she does not need a refill currently for her sleep.  Her acid reflux is doing okay with Protonix.  She is taking her Singulair for her allergies.    She is taking her Synthroid for her thyroid daily.      Allergies  Pravastatin sodium, Hydrocodone-acetaminophen, Lisinopril, and Pollen extract    Social History     Tobacco Use   • Smoking status: Never Smoker   • Smokeless tobacco: Never Used   Vaping Use   • Vaping Use: Never used   Substance Use Topics   • Alcohol use: Never   • Drug use: Never       Family History   Problem Relation Age of Onset   • Stroke Mother    • Diabetes Mother    • Nephrolithiasis Father    • Colon cancer Maternal Grandmother         50S   • Colon cancer Paternal Grandfather         70S   • Breast cancer Other         20S   • Diabetes Maternal Uncle         Health Maintenance Due   Topic Date Due   • ANNUAL PHYSICAL  Never done   • HEPATITIS C SCREENING  Never done   • DIABETIC FOOT EXAM  Never done        Immunization History   Administered Date(s) Administered  "  • Influenza, Unspecified 11/25/2019   • Tdap 11/25/2019       Review of Systems   Constitutional: Negative for fatigue.   Respiratory: Negative for cough and shortness of breath.    Cardiovascular: Negative for chest pain.   Gastrointestinal: Negative for diarrhea, nausea and vomiting.        Objective       Vitals:    04/05/22 0819   BP: 116/82   Pulse: 85   Resp: 16   Temp: 97.4 °F (36.3 °C)   SpO2: 95%   Weight: 84.6 kg (186 lb 9.6 oz)   Height: 152.4 cm (60\")       Body mass index is 36.44 kg/m².         Physical Exam  Vitals reviewed.   Constitutional:       Appearance: Normal appearance. She is well-developed.   Cardiovascular:      Rate and Rhythm: Normal rate and regular rhythm.      Heart sounds: Normal heart sounds. No murmur heard.  Pulmonary:      Effort: Pulmonary effort is normal.      Breath sounds: Normal breath sounds.   Neurological:      Mental Status: She is alert and oriented to person, place, and time.      Cranial Nerves: No cranial nerve deficit.      Motor: No weakness.   Psychiatric:         Mood and Affect: Mood and affect normal.             Result Review :{Labs  Result Review  Imaging  Med Tab  Media :23}     The following data was reviewed by: RAHEEM Burgos on 04/05/2022:    Common labs    Common Labsle 4/15/21 4/15/21 4/15/21 1/18/22 3/29/22 3/29/22 3/29/22 3/29/22 3/29/22    0900 0900 0900  1006 1009 1009 1009 1009   Glucose  138 (A)  121 (A)     108 (A)   BUN  13  13     15   Creatinine  0.72  0.72     0.98   eGFR Non African Am    85        Sodium  140  138     141   Potassium  4.0  4.2     3.6   Chloride  99  98     102   Calcium  9.3  10.1     9.7   Albumin  4.3       4.80   Total Bilirubin  0.29       0.2   Alkaline Phosphatase  89       84   AST (SGOT)  31       36 (A)   ALT (SGPT)  33       49 (A)   WBC  7.00    7.44      Hemoglobin  14.8    15.2      Hematocrit  46.6    46.4      Platelets  301    339      Total Cholesterol        259 (A)    Total " Cholesterol  251 (A)          Triglycerides  421 (A)      333 (A)    HDL Cholesterol  40      42    LDL Cholesterol    162 (A)     155 (A)    Hemoglobin A1C 6.8 (A)      7.20 (A)     Microalbumin, Urine     4.4       (A) Abnormal value       Comments are available for some flowsheets but are not being displayed.           Most Recent A1C    HGBA1C Most Recent 3/29/22   Hemoglobin A1C 7.20 (A)   (A) Abnormal value                           Assessment and Plan      Diagnoses and all orders for this visit:    1. Iron deficiency anemia, unspecified iron deficiency anemia type (Primary)  -     iron polysaccharides (Ferrex 150) 150 MG capsule; Take 1 capsule by mouth Daily.  Dispense: 90 capsule; Refill: 1  -     Iron Profile; Future  -     Ferritin; Future  -     Vitamin B12 & Folate; Future    2. Primary hypertension  -     amLODIPine (NORVASC) 10 MG tablet; Take 1 tablet by mouth Daily.  Dispense: 30 tablet; Refill: 5  -     hydroCHLOROthiazide (HYDRODIURIL) 12.5 MG tablet; Take 1 tablet by mouth Daily.  Dispense: 30 tablet; Refill: 5  -     CBC & Differential; Future  -     Comprehensive Metabolic Panel; Future    3. Anxiety  -     ARIPiprazole (ABILIFY) 5 MG tablet; Take 1 tablet by mouth Daily.  Dispense: 30 tablet; Refill: 5  -     DULoxetine (CYMBALTA) 60 MG capsule; Take 1 capsule by mouth Daily.  Dispense: 30 capsule; Refill: 5    4. Hyperlipidemia, unspecified hyperlipidemia type  -     CBC & Differential; Future  -     Comprehensive Metabolic Panel; Future  -     Lipid Panel; Future    5. Acquired hypothyroidism  -     levothyroxine (SYNTHROID, LEVOTHROID) 25 MCG tablet; Take 1 tablet by mouth Daily.  Dispense: 30 tablet; Refill: 5  -     TSH; Future  -     T4, Free; Future    6. Fibromyalgia  -     DULoxetine (CYMBALTA) 60 MG capsule; Take 1 capsule by mouth Daily.  Dispense: 30 capsule; Refill: 5  -     meloxicam (MOBIC) 7.5 MG tablet; Take 1 tablet by mouth Daily.  Dispense: 30 tablet; Refill: 5    7. Type  2 diabetes mellitus with hyperglycemia, without long-term current use of insulin (HCC)  -     metFORMIN (GLUCOPHAGE) 1000 MG tablet; Take 1 tablet by mouth 2 (Two) Times a Day With Meals.  Dispense: 60 tablet; Refill: 5  -     Hemoglobin A1c; Future  -     MicroAlbumin, Urine, Random - Urine, Clean Catch; Future  -     Hemoglobin A1c; Future    8. Seasonal allergic rhinitis, unspecified trigger  -     montelukast (SINGULAIR) 10 MG tablet; Take 1 tablet by mouth Every Evening.  Dispense: 30 tablet; Refill: 5    9. Gastroesophageal reflux disease without esophagitis  -     pantoprazole (PROTONIX) 40 MG EC tablet; Take 1 tablet by mouth Daily.  Dispense: 30 tablet; Refill: 5    10. Other insomnia    11. Vitamin D deficiency  -     vitamin D (ERGOCALCIFEROL) 1.25 MG (38108 UT) capsule capsule; Take 1 capsule by mouth 1 (One) Time Per Week.  Dispense: 13 capsule; Refill: 1  -     Vitamin D 25 Hydroxy; Future            Follow Up     Return in about 6 months (around 10/5/2022).  She will do A1c in 3 months and if the sugar is trending down with wt loss then we will cut back on the metformin.  If she wants she can check sugar if she is feeling the lows and we may need to decrease before the 3 mths we will also change the over-the-counter iron to prescription iron to see how she is tolerating.  We will recheck those labs in 6 months.  Patient was given instructions and counseling regarding her condition or for health maintenance advice. Please see specific information pulled into the AVS if appropriate.         Audra Tran, RAHEEM  04/05/2022

## 2022-04-18 ENCOUNTER — TELEPHONE (OUTPATIENT)
Dept: FAMILY MEDICINE CLINIC | Facility: CLINIC | Age: 52
End: 2022-04-18

## 2022-04-18 NOTE — TELEPHONE ENCOUNTER
Faxed Rx request for trazodone. Contacted patient because her last visit in office she stated she did not need refills. Patient stated Rx request was auto generated and she did not currently need refill.

## 2022-05-05 ENCOUNTER — TELEPHONE (OUTPATIENT)
Dept: FAMILY MEDICINE CLINIC | Facility: CLINIC | Age: 52
End: 2022-05-05

## 2022-05-05 NOTE — TELEPHONE ENCOUNTER
Caller: Carlota Robin    Relationship to patient: Self    Best call back number: [phone]    Chief complaint: CHEST CONGESTION, SINUS DRAINAGE, COUGH     Type of visit: SAME DAY APT     Requested date: TODAY 05/15/2022      Additional notes: PATIENT STATES IF THERE IS NO AVAILABLE APPOINTMENTS TODAY SHE WILL BE OKAY WITH SEEING PROVIDER 05/06/2022        HUB ATTEMPTED TO SCHEDULE SAME DAY APPOINTMENT AND HAD NONE AVAILABLE

## 2022-05-19 DIAGNOSIS — G47.09 OTHER INSOMNIA: ICD-10-CM

## 2022-05-19 RX ORDER — TRAZODONE HYDROCHLORIDE 50 MG/1
50 TABLET ORAL NIGHTLY
Qty: 30 TABLET | Refills: 2 | Status: SHIPPED | OUTPATIENT
Start: 2022-05-19

## 2022-07-21 DIAGNOSIS — N32.81 OAB (OVERACTIVE BLADDER): Primary | ICD-10-CM

## 2022-07-21 RX ORDER — LEVOFLOXACIN 500 MG/1
500 TABLET, FILM COATED ORAL DAILY
Qty: 7 TABLET | Refills: 0 | Status: SHIPPED | OUTPATIENT
Start: 2022-08-02 | End: 2022-08-09

## 2022-08-02 ENCOUNTER — LAB (OUTPATIENT)
Dept: LAB | Facility: HOSPITAL | Age: 52
End: 2022-08-02

## 2022-08-02 DIAGNOSIS — N32.81 OAB (OVERACTIVE BLADDER): ICD-10-CM

## 2022-08-02 PROCEDURE — 87086 URINE CULTURE/COLONY COUNT: CPT

## 2022-08-03 LAB — BACTERIA SPEC AEROBE CULT: NO GROWTH

## 2022-08-05 ENCOUNTER — PROCEDURE VISIT (OUTPATIENT)
Dept: UROLOGY | Facility: CLINIC | Age: 52
End: 2022-08-05

## 2022-08-05 VITALS — WEIGHT: 178.2 LBS | BODY MASS INDEX: 34.99 KG/M2 | HEIGHT: 60 IN

## 2022-08-05 DIAGNOSIS — N32.81 OAB (OVERACTIVE BLADDER): Primary | ICD-10-CM

## 2022-08-05 LAB
BILIRUB BLD-MCNC: NEGATIVE MG/DL
CLARITY, POC: CLEAR
COLOR UR: YELLOW
EXPIRATION DATE: 823
GLUCOSE UR STRIP-MCNC: NEGATIVE MG/DL
KETONES UR QL: NEGATIVE
LEUKOCYTE EST, POC: NEGATIVE
Lab: NORMAL
NITRITE UR-MCNC: NEGATIVE MG/ML
PH UR: 5.5 [PH] (ref 5–8)
PROT UR STRIP-MCNC: NEGATIVE MG/DL
RBC # UR STRIP: NEGATIVE /UL
SP GR UR: 1.03 (ref 1–1.03)
UROBILINOGEN UR QL: NORMAL

## 2022-08-05 PROCEDURE — 51700 IRRIGATION OF BLADDER: CPT | Performed by: UROLOGY

## 2022-08-05 PROCEDURE — 52287 CYSTOSCOPY CHEMODENERVATION: CPT | Performed by: UROLOGY

## 2022-08-05 NOTE — PROGRESS NOTES
Bladder Botox    Date/Time: 8/5/2022 1:18 PM  Performed by: Dyan Yuan MD  Authorized by: Dyan Yuan MD   Preparation: Patient was prepped and draped in the usual sterile fashion.  Local anesthesia used: no    Anesthesia:  Local anesthesia used: no    Sedation:  Patient sedated: no    Patient tolerance: patient tolerated the procedure well with no immediate complications  Comments: Dx:  OAB    Lane catheter inserted and 50cc of lidocaine was instilled and allowed to remain for 20 minutes.  Lane catheter was removed. The flexible cystoscope was inserted. Bladder was inspected and no abnormalities seen. 20 injection sites on the posterior bladder wall were injected with 0.5cc of botox for a total of 100 units. No complications. The cystoscope was removed. The patient tolerated the procedure well with no bleeding.

## 2022-08-29 NOTE — TELEPHONE ENCOUNTER
For Vit E. Patient scheduled 9/27/22 for refills. 30 day supply sent to last until next scheduled apt.

## 2022-08-30 DIAGNOSIS — E55.9 VITAMIN D DEFICIENCY: ICD-10-CM

## 2022-08-30 RX ORDER — ERGOCALCIFEROL 1.25 MG/1
50000 CAPSULE ORAL WEEKLY
Qty: 5 CAPSULE | Refills: 0 | Status: SHIPPED | OUTPATIENT
Start: 2022-08-30 | End: 2022-09-27 | Stop reason: SDUPTHER

## 2022-08-30 NOTE — TELEPHONE ENCOUNTER
For Vit D. Patient scheduled 9/27/22 for refills. 30 day supply sent to last until next scheduled apt.

## 2022-09-20 ENCOUNTER — TELEPHONE (OUTPATIENT)
Dept: FAMILY MEDICINE CLINIC | Facility: CLINIC | Age: 52
End: 2022-09-20

## 2022-09-20 NOTE — TELEPHONE ENCOUNTER
Caller: Carlota Robin    Relationship to patient: Self    Best call back number: [phone]    Patient is needing:PATIENT CALLED IN STATING THE OFFICE HAD CALLED HER ABOUT HER traZODone (DESYREL) 50 MG tablet. PATIENT STATES SHE DOES NOT NEED THIS REFILLED AT THIS TIME.

## 2022-09-27 ENCOUNTER — OFFICE VISIT (OUTPATIENT)
Dept: FAMILY MEDICINE CLINIC | Facility: CLINIC | Age: 52
End: 2022-09-27

## 2022-09-27 VITALS
HEIGHT: 60 IN | DIASTOLIC BLOOD PRESSURE: 82 MMHG | OXYGEN SATURATION: 98 % | TEMPERATURE: 98.2 F | SYSTOLIC BLOOD PRESSURE: 128 MMHG | WEIGHT: 179.9 LBS | BODY MASS INDEX: 35.32 KG/M2 | HEART RATE: 94 BPM | RESPIRATION RATE: 18 BRPM

## 2022-09-27 DIAGNOSIS — D50.9 IRON DEFICIENCY ANEMIA, UNSPECIFIED IRON DEFICIENCY ANEMIA TYPE: ICD-10-CM

## 2022-09-27 DIAGNOSIS — G47.09 OTHER INSOMNIA: ICD-10-CM

## 2022-09-27 DIAGNOSIS — K21.9 GASTROESOPHAGEAL REFLUX DISEASE WITHOUT ESOPHAGITIS: ICD-10-CM

## 2022-09-27 DIAGNOSIS — J30.2 SEASONAL ALLERGIC RHINITIS, UNSPECIFIED TRIGGER: ICD-10-CM

## 2022-09-27 DIAGNOSIS — E55.9 VITAMIN D DEFICIENCY: ICD-10-CM

## 2022-09-27 DIAGNOSIS — Z11.59 NEED FOR HEPATITIS C SCREENING TEST: ICD-10-CM

## 2022-09-27 DIAGNOSIS — E11.65 TYPE 2 DIABETES MELLITUS WITH HYPERGLYCEMIA, WITHOUT LONG-TERM CURRENT USE OF INSULIN: ICD-10-CM

## 2022-09-27 DIAGNOSIS — I10 PRIMARY HYPERTENSION: Primary | ICD-10-CM

## 2022-09-27 DIAGNOSIS — M79.7 FIBROMYALGIA: ICD-10-CM

## 2022-09-27 DIAGNOSIS — E03.9 ACQUIRED HYPOTHYROIDISM: ICD-10-CM

## 2022-09-27 DIAGNOSIS — E53.8 VITAMIN B 12 DEFICIENCY: ICD-10-CM

## 2022-09-27 DIAGNOSIS — F41.9 ANXIETY: ICD-10-CM

## 2022-09-27 DIAGNOSIS — Z12.31 SCREENING MAMMOGRAM FOR BREAST CANCER: ICD-10-CM

## 2022-09-27 LAB
25(OH)D3 SERPL-MCNC: 68.5 NG/ML (ref 30–100)
ALBUMIN SERPL-MCNC: 4.2 G/DL (ref 3.5–5.2)
ALBUMIN UR-MCNC: 1.2 MG/DL
ALBUMIN/GLOB SERPL: 1.4 G/DL
ALP SERPL-CCNC: 90 U/L (ref 39–117)
ALT SERPL W P-5'-P-CCNC: 15 U/L (ref 1–33)
ANION GAP SERPL CALCULATED.3IONS-SCNC: 9 MMOL/L (ref 5–15)
AST SERPL-CCNC: 17 U/L (ref 1–32)
BASOPHILS # BLD AUTO: 0.05 10*3/MM3 (ref 0–0.2)
BASOPHILS NFR BLD AUTO: 0.8 % (ref 0–1.5)
BILIRUB SERPL-MCNC: <0.2 MG/DL (ref 0–1.2)
BUN SERPL-MCNC: 14 MG/DL (ref 6–20)
BUN/CREAT SERPL: 21.9 (ref 7–25)
CALCIUM SPEC-SCNC: 9.1 MG/DL (ref 8.6–10.5)
CHLORIDE SERPL-SCNC: 107 MMOL/L (ref 98–107)
CHOLEST SERPL-MCNC: 248 MG/DL (ref 0–200)
CO2 SERPL-SCNC: 26 MMOL/L (ref 22–29)
CREAT SERPL-MCNC: 0.64 MG/DL (ref 0.57–1)
DEPRECATED RDW RBC AUTO: 39.4 FL (ref 37–54)
EGFRCR SERPLBLD CKD-EPI 2021: 105.8 ML/MIN/1.73
EOSINOPHIL # BLD AUTO: 0.11 10*3/MM3 (ref 0–0.4)
EOSINOPHIL NFR BLD AUTO: 1.8 % (ref 0.3–6.2)
ERYTHROCYTE [DISTWIDTH] IN BLOOD BY AUTOMATED COUNT: 13.1 % (ref 12.3–15.4)
FERRITIN SERPL-MCNC: 164 NG/ML (ref 13–150)
FOLATE SERPL-MCNC: 18.4 NG/ML (ref 4.78–24.2)
GLOBULIN UR ELPH-MCNC: 3 GM/DL
GLUCOSE SERPL-MCNC: 138 MG/DL (ref 65–99)
HBA1C MFR BLD: 6.4 % (ref 4.8–5.6)
HCT VFR BLD AUTO: 40 % (ref 34–46.6)
HCV AB SER DONR QL: NORMAL
HDLC SERPL-MCNC: 44 MG/DL (ref 40–60)
HGB BLD-MCNC: 13.9 G/DL (ref 12–15.9)
HOLD SPECIMEN: NORMAL
HOLD SPECIMEN: NORMAL
IMM GRANULOCYTES # BLD AUTO: 0.03 10*3/MM3 (ref 0–0.05)
IMM GRANULOCYTES NFR BLD AUTO: 0.5 % (ref 0–0.5)
IRON 24H UR-MRATE: 45 MCG/DL (ref 37–145)
IRON SATN MFR SERPL: 11 % (ref 20–50)
LDLC SERPL CALC-MCNC: 140 MG/DL (ref 0–100)
LDLC/HDLC SERPL: 3.03 {RATIO}
LYMPHOCYTES # BLD AUTO: 1.86 10*3/MM3 (ref 0.7–3.1)
LYMPHOCYTES NFR BLD AUTO: 30.5 % (ref 19.6–45.3)
MCH RBC QN AUTO: 28.8 PG (ref 26.6–33)
MCHC RBC AUTO-ENTMCNC: 34.8 G/DL (ref 31.5–35.7)
MCV RBC AUTO: 82.8 FL (ref 79–97)
MONOCYTES # BLD AUTO: 0.54 10*3/MM3 (ref 0.1–0.9)
MONOCYTES NFR BLD AUTO: 8.9 % (ref 5–12)
NEUTROPHILS NFR BLD AUTO: 3.5 10*3/MM3 (ref 1.7–7)
NEUTROPHILS NFR BLD AUTO: 57.5 % (ref 42.7–76)
NRBC BLD AUTO-RTO: 0 /100 WBC (ref 0–0.2)
PLATELET # BLD AUTO: 310 10*3/MM3 (ref 140–450)
PMV BLD AUTO: 10.2 FL (ref 6–12)
POTASSIUM SERPL-SCNC: 4.2 MMOL/L (ref 3.5–5.2)
PROT SERPL-MCNC: 7.2 G/DL (ref 6–8.5)
RBC # BLD AUTO: 4.83 10*6/MM3 (ref 3.77–5.28)
SODIUM SERPL-SCNC: 142 MMOL/L (ref 136–145)
T4 FREE SERPL-MCNC: 1.07 NG/DL (ref 0.93–1.7)
TIBC SERPL-MCNC: 405 MCG/DL (ref 298–536)
TRANSFERRIN SERPL-MCNC: 272 MG/DL (ref 200–360)
TRIGL SERPL-MCNC: 353 MG/DL (ref 0–150)
TSH SERPL DL<=0.05 MIU/L-ACNC: 3.74 UIU/ML (ref 0.27–4.2)
VIT B12 BLD-MCNC: 1316 PG/ML (ref 211–946)
VLDLC SERPL-MCNC: 64 MG/DL (ref 5–40)
WBC NRBC COR # BLD: 6.09 10*3/MM3 (ref 3.4–10.8)

## 2022-09-27 PROCEDURE — 82306 VITAMIN D 25 HYDROXY: CPT | Performed by: NURSE PRACTITIONER

## 2022-09-27 PROCEDURE — 86803 HEPATITIS C AB TEST: CPT | Performed by: NURSE PRACTITIONER

## 2022-09-27 PROCEDURE — 80053 COMPREHEN METABOLIC PANEL: CPT | Performed by: NURSE PRACTITIONER

## 2022-09-27 PROCEDURE — 82043 UR ALBUMIN QUANTITATIVE: CPT | Performed by: NURSE PRACTITIONER

## 2022-09-27 PROCEDURE — 84443 ASSAY THYROID STIM HORMONE: CPT | Performed by: NURSE PRACTITIONER

## 2022-09-27 PROCEDURE — 84439 ASSAY OF FREE THYROXINE: CPT | Performed by: NURSE PRACTITIONER

## 2022-09-27 PROCEDURE — 80061 LIPID PANEL: CPT | Performed by: NURSE PRACTITIONER

## 2022-09-27 PROCEDURE — 82607 VITAMIN B-12: CPT | Performed by: NURSE PRACTITIONER

## 2022-09-27 PROCEDURE — 83036 HEMOGLOBIN GLYCOSYLATED A1C: CPT | Performed by: NURSE PRACTITIONER

## 2022-09-27 PROCEDURE — 99214 OFFICE O/P EST MOD 30 MIN: CPT | Performed by: NURSE PRACTITIONER

## 2022-09-27 PROCEDURE — 85025 COMPLETE CBC W/AUTO DIFF WBC: CPT | Performed by: NURSE PRACTITIONER

## 2022-09-27 PROCEDURE — 83540 ASSAY OF IRON: CPT | Performed by: NURSE PRACTITIONER

## 2022-09-27 PROCEDURE — 82746 ASSAY OF FOLIC ACID SERUM: CPT | Performed by: NURSE PRACTITIONER

## 2022-09-27 PROCEDURE — 36415 COLL VENOUS BLD VENIPUNCTURE: CPT | Performed by: NURSE PRACTITIONER

## 2022-09-27 PROCEDURE — 84466 ASSAY OF TRANSFERRIN: CPT | Performed by: NURSE PRACTITIONER

## 2022-09-27 PROCEDURE — 82728 ASSAY OF FERRITIN: CPT | Performed by: NURSE PRACTITIONER

## 2022-09-27 RX ORDER — LEVOTHYROXINE SODIUM 0.03 MG/1
25 TABLET ORAL DAILY
Qty: 30 TABLET | Refills: 5 | Status: SHIPPED | OUTPATIENT
Start: 2022-09-27

## 2022-09-27 RX ORDER — ARIPIPRAZOLE 5 MG/1
5 TABLET ORAL DAILY
Qty: 30 TABLET | Refills: 5 | Status: SHIPPED | OUTPATIENT
Start: 2022-09-27 | End: 2023-02-09 | Stop reason: SDUPTHER

## 2022-09-27 RX ORDER — AMLODIPINE BESYLATE 10 MG/1
10 TABLET ORAL DAILY
Qty: 30 TABLET | Refills: 5 | Status: SHIPPED | OUTPATIENT
Start: 2022-09-27 | End: 2023-02-09 | Stop reason: SDUPTHER

## 2022-09-27 RX ORDER — MONTELUKAST SODIUM 10 MG/1
10 TABLET ORAL EVERY EVENING
Qty: 30 TABLET | Refills: 5 | Status: SHIPPED | OUTPATIENT
Start: 2022-09-27 | End: 2023-02-09 | Stop reason: SDUPTHER

## 2022-09-27 RX ORDER — DULOXETIN HYDROCHLORIDE 60 MG/1
60 CAPSULE, DELAYED RELEASE ORAL DAILY
Qty: 30 CAPSULE | Refills: 5 | Status: SHIPPED | OUTPATIENT
Start: 2022-09-27 | End: 2023-02-09 | Stop reason: SDUPTHER

## 2022-09-27 RX ORDER — IRON POLYSACCHARIDE COMPLEX 150 MG
150 CAPSULE ORAL DAILY
Qty: 90 CAPSULE | Refills: 1 | Status: SHIPPED | OUTPATIENT
Start: 2022-09-27

## 2022-09-27 RX ORDER — HYDROCHLOROTHIAZIDE 12.5 MG/1
12.5 TABLET ORAL DAILY
Qty: 30 TABLET | Refills: 5 | Status: SHIPPED | OUTPATIENT
Start: 2022-09-27

## 2022-09-27 RX ORDER — ERGOCALCIFEROL 1.25 MG/1
50000 CAPSULE ORAL WEEKLY
Qty: 5 CAPSULE | Refills: 0 | Status: SHIPPED | OUTPATIENT
Start: 2022-09-27 | End: 2022-12-08 | Stop reason: SDUPTHER

## 2022-09-27 RX ORDER — LISINOPRIL 5 MG/1
5 TABLET ORAL DAILY
Qty: 30 TABLET | Refills: 5 | Status: SHIPPED | OUTPATIENT
Start: 2022-09-27

## 2022-09-27 RX ORDER — MELOXICAM 7.5 MG/1
7.5 TABLET ORAL DAILY
Qty: 30 TABLET | Refills: 5 | Status: SHIPPED | OUTPATIENT
Start: 2022-09-27

## 2022-09-27 NOTE — PROGRESS NOTES
Answers for HPI/ROS submitted by the patient on 9/25/2022  Please describe your symptoms.: check up.  Have you had these symptoms before?: No  How long have you been having these symptoms?: 1-4 days  Please describe any probable cause for these symptoms. : na.  What is the primary reason for your visit?: Other    Chief Complaint  Hypothyroidism, Hypertension, Diabetes, and Hyperlipidemia    Subjective          Carlota Robin presents to [facility]  History of Present Illness  Diagnoses and all orders for this visit:  She is here for refills and fasting blood work.  Hypertension: She is well controlled with her lisinopril and hydrochlorothiazide.  She also takes her amlodipine daily.  No chest pain or shortness of breath noted.  Her  did just get over COVID and he is doing better but she did not get it.  Vitamin D deficiency and vitamin B12 deficiency: She is taking her vitamin D on a weekly basis.  She needs her B12 checked today.  Allergies: She is taking her Singulair daily.  She also needs a albuterol inhaler just to have on hand if she needs it for her wheezing/cough at times.  She is aware to rinse her mouth out after each use when needed.  Diabetes: She is due an A1c today.  She also is due her microalbumin.  She is taking her metformin twice a day.  She has lost 20 pounds in 6 months and is feeling good.  She is not checking sugars on a routine basis.  Fibro-: She is taking her Mobic and her Cymbalta on a daily basis.  It is well controlled currently.  Thyroid: She is taking her levothyroxine daily and is due lab work today.  Anemia: She takes her iron on a daily basis and is needing iron profile today along with ferritin to see how her levels are.  Anxiety: She is taking the Cymbalta also for the anxiety and the Abilify is doing well.  No suicidal thoughts or hallucinations.  GERD: She is taking her Tagamet every day morning and night.  No nausea or vomiting.  She is  "due a mammogram.  She is 6 months after her reduction.  She is doing well with her reduction.  No issues noted.      Allergies  Pravastatin sodium, Hydrocodone-acetaminophen, Lisinopril, and Pollen extract    Social History     Tobacco Use   • Smoking status: Never Smoker   • Smokeless tobacco: Never Used   Vaping Use   • Vaping Use: Never used   Substance Use Topics   • Alcohol use: Never   • Drug use: Never       Family History   Problem Relation Age of Onset   • Stroke Mother    • Diabetes Mother    • Nephrolithiasis Father    • Colon cancer Maternal Grandmother         50S   • Colon cancer Paternal Grandfather         70S   • Breast cancer Other         20S   • Diabetes Maternal Uncle         Health Maintenance Due   Topic Date Due   • ANNUAL PHYSICAL  Never done   • ZOSTER VACCINE (1 of 2) Never done   • HEPATITIS C SCREENING  Never done   • DIABETIC EYE EXAM  04/15/2022        Immunization History   Administered Date(s) Administered   • Influenza, Unspecified 11/25/2019   • Tdap 11/25/2019       Review of Systems   Constitutional: Negative for fatigue.   Respiratory: Negative for cough and shortness of breath.    Cardiovascular: Negative for chest pain.   Gastrointestinal: Negative for diarrhea, nausea and vomiting.        Objective       Vitals:    09/27/22 0804   BP: 128/82   Pulse: 94   Resp: 18   Temp: 98.2 °F (36.8 °C)   SpO2: 98%   Weight: 81.6 kg (179 lb 14.4 oz)   Height: 152.4 cm (60\")       Body mass index is 35.13 kg/m².         Physical Exam  Vitals reviewed.   Constitutional:       Appearance: Normal appearance. She is well-developed.   Cardiovascular:      Rate and Rhythm: Normal rate and regular rhythm.      Pulses:           Dorsalis pedis pulses are 2+ on the right side and 2+ on the left side.      Heart sounds: Normal heart sounds. No murmur heard.  Pulmonary:      Effort: Pulmonary effort is normal.      Breath sounds: Normal breath sounds.   Musculoskeletal:      Right lower leg: No edema. "      Left lower leg: No edema.   Feet:      Right foot:      Protective Sensation: 3 sites tested. 3 sites sensed.      Skin integrity: Skin integrity normal. No ulcer, blister or dry skin.      Toenail Condition: Right toenails are normal.      Left foot:      Protective Sensation: 3 sites tested. 3 sites sensed.      Skin integrity: Skin integrity normal. No ulcer, blister or dry skin.      Toenail Condition: Left toenails are normal.      Comments: Diabetic Foot Exam Performed and Monofilament Test Performed     Neurological:      Mental Status: She is alert and oriented to person, place, and time.      Cranial Nerves: No cranial nerve deficit.      Motor: No weakness.   Psychiatric:         Mood and Affect: Mood and affect normal.             Result Review :     The following data was reviewed by: RAHEEM Burgos on 09/27/2022:    Common labs    Common Labs 1/18/22 3/29/22 3/29/22 3/29/22 3/29/22 3/29/22     1006 1009 1009 1009 1009   Glucose 121 (A)     108 (A)   BUN 13     15   Creatinine 0.72     0.98   eGFR Non African Am 85        Sodium 138     141   Potassium 4.2     3.6   Chloride 98     102   Calcium 10.1     9.7   Albumin      4.80   Total Bilirubin      0.2   Alkaline Phosphatase      84   AST (SGOT)      36 (A)   ALT (SGPT)      49 (A)   WBC   7.44      Hemoglobin   15.2      Hematocrit   46.4      Platelets   339      Total Cholesterol     259 (A)    Triglycerides     333 (A)    HDL Cholesterol     42    LDL Cholesterol      155 (A)    Hemoglobin A1C    7.20 (A)     Microalbumin, Urine  4.4       (A) Abnormal value            Most Recent A1C    HGBA1C Most Recent 3/29/22   Hemoglobin A1C 7.20 (A)   (A) Abnormal value                           Assessment and Plan      Diagnoses and all orders for this visit:    1. Primary hypertension (Primary)  -     lisinopril (PRINIVIL,ZESTRIL) 5 MG tablet; Take 1 tablet by mouth Daily.  Dispense: 30 tablet; Refill: 5  -     hydroCHLOROthiazide  (HYDRODIURIL) 12.5 MG tablet; Take 1 tablet by mouth Daily.  Dispense: 30 tablet; Refill: 5  -     amLODIPine (NORVASC) 10 MG tablet; Take 1 tablet by mouth Daily.  Dispense: 30 tablet; Refill: 5  -     TSH+Free T4  -     CBC & Differential  -     Comprehensive Metabolic Panel  -     Lipid Panel    2. Vitamin D deficiency  -     vitamin D (ERGOCALCIFEROL) 1.25 MG (56833 UT) capsule capsule; Take 1 capsule by mouth 1 (One) Time Per Week.  Dispense: 5 capsule; Refill: 0  -     Vitamin D 25 Hydroxy    3. Other insomnia    4. Seasonal allergic rhinitis, unspecified trigger  -     montelukast (SINGULAIR) 10 MG tablet; Take 1 tablet by mouth Every Evening.  Dispense: 30 tablet; Refill: 5  -     albuterol (PROAIR RESPICLICK) 108 (90 Base) MCG/ACT inhaler; Inhale 1 puff Every 4 (Four) Hours As Needed for Wheezing.  Dispense: 1 each; Refill: 0    5. Type 2 diabetes mellitus with hyperglycemia, without long-term current use of insulin (HCC)  -     metFORMIN (GLUCOPHAGE) 1000 MG tablet; Take 1 tablet by mouth 2 (Two) Times a Day With Meals.  Dispense: 60 tablet; Refill: 5  -     Hemoglobin A1c  -     MicroAlbumin, Urine, Random - Urine, Clean Catch    6. Fibromyalgia  -     meloxicam (MOBIC) 7.5 MG tablet; Take 1 tablet by mouth Daily.  Dispense: 30 tablet; Refill: 5  -     DULoxetine (CYMBALTA) 60 MG capsule; Take 1 capsule by mouth Daily.  Dispense: 30 capsule; Refill: 5    7. Acquired hypothyroidism  -     levothyroxine (SYNTHROID, LEVOTHROID) 25 MCG tablet; Take 1 tablet by mouth Daily.  Dispense: 30 tablet; Refill: 5  -     TSH+Free T4  -     CBC & Differential  -     Comprehensive Metabolic Panel  -     Lipid Panel    8. Iron deficiency anemia, unspecified iron deficiency anemia type  -     iron polysaccharides (Ferrex 150) 150 MG capsule; Take 1 capsule by mouth Daily.  Dispense: 90 capsule; Refill: 1  -     Iron Profile  -     Ferritin    9. Anxiety  -     DULoxetine (CYMBALTA) 60 MG capsule; Take 1 capsule by mouth  Daily.  Dispense: 30 capsule; Refill: 5  -     ARIPiprazole (ABILIFY) 5 MG tablet; Take 1 tablet by mouth Daily.  Dispense: 30 tablet; Refill: 5    10. Need for hepatitis C screening test  -     Hepatitis C Antibody    11. Vitamin B 12 deficiency    12. Gastroesophageal reflux disease without esophagitis  -     cimetidine (TAGAMET) 200 MG tablet; Take 1 tablet by mouth 2 (Two) Times a Day Before Meals.  Dispense: 60 tablet; Refill: 2  -     Vitamin B12 & Folate    13. Screening mammogram for breast cancer  -     Mammo Screening Digital Tomosynthesis Bilateral With CAD; Future    Other orders  -     vitamin E 200 UNIT capsule; Take 1 capsule by mouth Daily.  Dispense: 90 capsule; Refill: 1            Follow Up     Return in about 6 months (around 3/27/2023).  Follow-up in 6 months for labs and appt. Call with any concerns or questions that you may have regarding your medications or history.    We did discuss that she needs to get her eyes checked and she just got her reminder card.  We will schedule for mammogram.  Call with questions or concerns.  We will adjust medications according to the labs.  Patient was given instructions and counseling regarding her condition or for health maintenance advice. Please see specific information pulled into the AVS if appropriate.         Audra Tran, APRN  09/27/2022

## 2022-09-28 RX ORDER — EZETIMIBE 10 MG/1
10 TABLET ORAL DAILY
Qty: 90 TABLET | Refills: 1 | Status: SHIPPED | OUTPATIENT
Start: 2022-09-28

## 2022-10-18 ENCOUNTER — LAB (OUTPATIENT)
Dept: FAMILY MEDICINE CLINIC | Facility: CLINIC | Age: 52
End: 2022-10-18

## 2022-11-29 DIAGNOSIS — Z79.890 HORMONE REPLACEMENT THERAPY: Primary | ICD-10-CM

## 2022-11-29 RX ORDER — ESTRADIOL 10 UG/1
INSERT VAGINAL
Qty: 8 TABLET | Refills: 0 | Status: SHIPPED | OUTPATIENT
Start: 2022-11-29

## 2022-12-08 DIAGNOSIS — E55.9 VITAMIN D DEFICIENCY: ICD-10-CM

## 2022-12-08 RX ORDER — ERGOCALCIFEROL 1.25 MG/1
50000 CAPSULE ORAL WEEKLY
Qty: 5 CAPSULE | Refills: 0 | Status: SHIPPED | OUTPATIENT
Start: 2022-12-08

## 2022-12-29 ENCOUNTER — HOSPITAL ENCOUNTER (OUTPATIENT)
Dept: MAMMOGRAPHY | Facility: HOSPITAL | Age: 52
Discharge: HOME | End: 2022-12-29
Admitting: NURSE PRACTITIONER

## 2022-12-29 DIAGNOSIS — Z12.31 SCREENING MAMMOGRAM FOR BREAST CANCER: ICD-10-CM

## 2022-12-29 PROCEDURE — 77067 SCR MAMMO BI INCL CAD: CPT

## 2022-12-29 PROCEDURE — 77063 BREAST TOMOSYNTHESIS BI: CPT

## 2023-02-09 ENCOUNTER — TELEPHONE (OUTPATIENT)
Dept: FAMILY MEDICINE CLINIC | Facility: CLINIC | Age: 53
End: 2023-02-09
Payer: COMMERCIAL

## 2023-02-09 DIAGNOSIS — I10 PRIMARY HYPERTENSION: ICD-10-CM

## 2023-02-09 DIAGNOSIS — M79.7 FIBROMYALGIA: ICD-10-CM

## 2023-02-09 DIAGNOSIS — J30.2 SEASONAL ALLERGIC RHINITIS, UNSPECIFIED TRIGGER: ICD-10-CM

## 2023-02-09 DIAGNOSIS — K21.9 GASTROESOPHAGEAL REFLUX DISEASE WITHOUT ESOPHAGITIS: ICD-10-CM

## 2023-02-09 DIAGNOSIS — F41.9 ANXIETY: ICD-10-CM

## 2023-02-09 RX ORDER — MONTELUKAST SODIUM 10 MG/1
10 TABLET ORAL EVERY EVENING
Qty: 30 TABLET | Refills: 1 | Status: SHIPPED | OUTPATIENT
Start: 2023-02-09

## 2023-02-09 RX ORDER — DULOXETIN HYDROCHLORIDE 60 MG/1
60 CAPSULE, DELAYED RELEASE ORAL DAILY
Qty: 30 CAPSULE | Refills: 1 | Status: SHIPPED | OUTPATIENT
Start: 2023-02-09

## 2023-02-09 RX ORDER — ARIPIPRAZOLE 5 MG/1
5 TABLET ORAL DAILY
Qty: 30 TABLET | Refills: 1 | Status: SHIPPED | OUTPATIENT
Start: 2023-02-09

## 2023-02-09 RX ORDER — AMLODIPINE BESYLATE 10 MG/1
10 TABLET ORAL DAILY
Qty: 30 TABLET | Refills: 1 | Status: SHIPPED | OUTPATIENT
Start: 2023-02-09

## 2023-02-09 NOTE — TELEPHONE ENCOUNTER
Caller: Carlota Robin    Relationship: Self    Best call back number: [phone]    Requested Prescriptions:   Requested Prescriptions     Pending Prescriptions Disp Refills   • montelukast (SINGULAIR) 10 MG tablet 30 tablet 5     Sig: Take 1 tablet by mouth Every Evening.   • amLODIPine (NORVASC) 10 MG tablet 30 tablet 5     Sig: Take 1 tablet by mouth Daily.   • vitamin E 200 UNIT capsule 90 capsule 1     Sig: Take 1 capsule by mouth Daily.   • ARIPiprazole (ABILIFY) 5 MG tablet 30 tablet 5     Sig: Take 1 tablet by mouth Daily.   • DULoxetine (CYMBALTA) 60 MG capsule 30 capsule 5     Sig: Take 1 capsule by mouth Daily.   • cimetidine (TAGAMET) 200 MG tablet 60 tablet 2     Sig: Take 1 tablet by mouth 2 (Two) Times a Day Before Meals.    PANTOPRAZOLE SODIUM 40MG TABLETS    Pharmacy where request should be sent: [facility] - [address] [phone] [facility] [phone]      Additional details provided by patient: PATIENT HAS THREE DAY SUPPLY OF THESE MEDICATIONS. PLEASE SEND NEW PRESCRIPTIONS TO PHARMACY ASAP.    Does the patient have less than a 3 day supply:  [] Yes  [x] No    Would you like a call back once the refill request has been completed: [] Yes [] No    If the office needs to give you a call back, can they leave a voicemail: [] Yes [] No    Siddharth Larkin Rep   02/09/23 10:54 EST

## 2023-02-16 DIAGNOSIS — N32.81 OAB (OVERACTIVE BLADDER): Primary | ICD-10-CM

## 2023-02-16 DIAGNOSIS — N30.00 ACUTE CYSTITIS WITHOUT HEMATURIA: ICD-10-CM

## 2023-02-16 RX ORDER — LEVOFLOXACIN 500 MG/1
500 TABLET, FILM COATED ORAL DAILY
Qty: 7 TABLET | Refills: 0 | Status: SHIPPED | OUTPATIENT
Start: 2023-03-14 | End: 2023-03-21

## 2023-03-14 ENCOUNTER — LAB (OUTPATIENT)
Dept: LAB | Facility: HOSPITAL | Age: 53
End: 2023-03-14
Payer: COMMERCIAL

## 2023-03-14 DIAGNOSIS — N32.81 OAB (OVERACTIVE BLADDER): ICD-10-CM

## 2023-03-14 DIAGNOSIS — N30.00 ACUTE CYSTITIS WITHOUT HEMATURIA: ICD-10-CM

## 2023-03-14 PROCEDURE — 87086 URINE CULTURE/COLONY COUNT: CPT

## 2023-03-15 LAB — BACTERIA SPEC AEROBE CULT: NO GROWTH

## 2023-03-17 ENCOUNTER — PROCEDURE VISIT (OUTPATIENT)
Dept: UROLOGY | Facility: CLINIC | Age: 53
End: 2023-03-17
Payer: COMMERCIAL

## 2023-03-17 VITALS — BODY MASS INDEX: 39.74 KG/M2 | HEIGHT: 60 IN | WEIGHT: 202.4 LBS

## 2023-03-17 DIAGNOSIS — N32.81 OAB (OVERACTIVE BLADDER): Primary | ICD-10-CM

## 2023-03-17 LAB
BILIRUB BLD-MCNC: NEGATIVE MG/DL
CLARITY, POC: CLEAR
COLOR UR: YELLOW
EXPIRATION DATE: 424
GLUCOSE UR STRIP-MCNC: ABNORMAL MG/DL
KETONES UR QL: NEGATIVE
LEUKOCYTE EST, POC: NEGATIVE
Lab: ABNORMAL
NITRITE UR-MCNC: NEGATIVE MG/ML
PH UR: 5.5 [PH] (ref 5–8)
PROT UR STRIP-MCNC: NEGATIVE MG/DL
RBC # UR STRIP: NEGATIVE /UL
SP GR UR: 1.02 (ref 1–1.03)
UROBILINOGEN UR QL: ABNORMAL

## 2023-03-17 PROCEDURE — 81003 URINALYSIS AUTO W/O SCOPE: CPT | Performed by: UROLOGY

## 2023-03-17 PROCEDURE — 52287 CYSTOSCOPY CHEMODENERVATION: CPT | Performed by: UROLOGY

## 2023-03-17 RX ORDER — TOPIRAMATE 50 MG/1
TABLET, FILM COATED ORAL
COMMUNITY
Start: 2022-12-01

## 2023-03-17 NOTE — PROGRESS NOTES
Cystoscopy    Date/Time: 3/17/2023 11:21 AM  Performed by: Dyan Yuan MD  Authorized by: Dyan Yuan MD   Preparation: Patient was prepped and draped in the usual sterile fashion.  Local anesthesia used: no    Anesthesia:  Local anesthesia used: no    Sedation:  Patient sedated: no    Patient tolerance: patient tolerated the procedure well with no immediate complications  Comments: Dx:  OAB    Lane catheter inserted and 50cc of lidocaine was instilled and allowed to remain for 20 minutes.  Lane catheter was removed. The flexible cystoscope was inserted. Bladder was inspected and no abnormalities seen. 20 injection sites on the posterior bladder wall were injected with 0.5cc of botox for a total of 100 units. No complications. The cystoscope was removed. The patient tolerated the procedure well with no bleeding.

## 2023-04-10 ENCOUNTER — TELEPHONE (OUTPATIENT)
Dept: FAMILY MEDICINE CLINIC | Facility: CLINIC | Age: 53
End: 2023-04-10
Payer: COMMERCIAL

## 2023-04-10 NOTE — TELEPHONE ENCOUNTER
Caller: MARIIA DIAZ    Relationship: Emergency Contact    Best call back number: [phone]    Requested Prescriptions:   PANTOPRAZOLE 40MG    Pharmacy where request should be sent:      Last office visit with prescribing clinician: 9/27/2022   Last telemedicine visit with prescribing clinician: Visit date not found   Next office visit with prescribing clinician: Visit date not found     Additional details provided by patient: THE PATIENT HAS BEEN OUT OF MEDICATION FOR A MONTH NOW AND NEEDS REFILL ASAP    Does the patient have less than a 3 day supply:  [x] Yes  [] No    Would you like a call back once the refill request has been completed: [x] Yes [] No    If the office needs to give you a call back, can they leave a voicemail: [x] Yes [] No    Siddharth Greco Rep   04/10/23 14:30 EDT

## 2023-04-12 RX ORDER — PANTOPRAZOLE SODIUM 40 MG/1
40 TABLET, DELAYED RELEASE ORAL DAILY
Qty: 30 TABLET | Refills: 0 | Status: SHIPPED | OUTPATIENT
Start: 2023-04-12

## 2023-04-12 RX ORDER — PANTOPRAZOLE SODIUM 40 MG/1
40 TABLET, DELAYED RELEASE ORAL DAILY
COMMUNITY
End: 2023-04-12 | Stop reason: SDUPTHER

## 2023-04-22 DIAGNOSIS — F41.9 ANXIETY: ICD-10-CM

## 2023-04-22 DIAGNOSIS — M79.7 FIBROMYALGIA: ICD-10-CM

## 2023-04-22 DIAGNOSIS — J30.2 SEASONAL ALLERGIC RHINITIS, UNSPECIFIED TRIGGER: ICD-10-CM

## 2023-04-22 DIAGNOSIS — I10 PRIMARY HYPERTENSION: ICD-10-CM

## 2023-04-24 RX ORDER — EZETIMIBE 10 MG/1
10 TABLET ORAL DAILY
Qty: 30 TABLET | Refills: 0 | Status: SHIPPED | OUTPATIENT
Start: 2023-04-24

## 2023-04-24 RX ORDER — DULOXETIN HYDROCHLORIDE 60 MG/1
60 CAPSULE, DELAYED RELEASE ORAL DAILY
Qty: 30 CAPSULE | Refills: 0 | Status: SHIPPED | OUTPATIENT
Start: 2023-04-24

## 2023-04-24 RX ORDER — MONTELUKAST SODIUM 10 MG/1
10 TABLET ORAL EVERY EVENING
Qty: 30 TABLET | Refills: 0 | Status: SHIPPED | OUTPATIENT
Start: 2023-04-24

## 2023-04-24 RX ORDER — AMLODIPINE BESYLATE 10 MG/1
10 TABLET ORAL DAILY
Qty: 30 TABLET | Refills: 0 | Status: SHIPPED | OUTPATIENT
Start: 2023-04-24

## 2023-04-24 RX ORDER — ARIPIPRAZOLE 5 MG/1
5 TABLET ORAL DAILY
Qty: 30 TABLET | Refills: 0 | Status: SHIPPED | OUTPATIENT
Start: 2023-04-24

## 2023-05-15 ENCOUNTER — OFFICE VISIT (OUTPATIENT)
Dept: FAMILY MEDICINE CLINIC | Facility: CLINIC | Age: 53
End: 2023-05-15
Payer: COMMERCIAL

## 2023-05-15 VITALS
TEMPERATURE: 98 F | BODY MASS INDEX: 40.93 KG/M2 | RESPIRATION RATE: 16 BRPM | HEART RATE: 97 BPM | HEIGHT: 60 IN | SYSTOLIC BLOOD PRESSURE: 134 MMHG | WEIGHT: 208.5 LBS | OXYGEN SATURATION: 99 % | DIASTOLIC BLOOD PRESSURE: 96 MMHG

## 2023-05-15 DIAGNOSIS — D50.9 IRON DEFICIENCY ANEMIA, UNSPECIFIED IRON DEFICIENCY ANEMIA TYPE: ICD-10-CM

## 2023-05-15 DIAGNOSIS — G47.09 OTHER INSOMNIA: ICD-10-CM

## 2023-05-15 DIAGNOSIS — E55.9 VITAMIN D DEFICIENCY: ICD-10-CM

## 2023-05-15 DIAGNOSIS — R25.1 SHAKING: ICD-10-CM

## 2023-05-15 DIAGNOSIS — M79.7 FIBROMYALGIA: ICD-10-CM

## 2023-05-15 DIAGNOSIS — E78.5 HYPERLIPIDEMIA, UNSPECIFIED HYPERLIPIDEMIA TYPE: Primary | ICD-10-CM

## 2023-05-15 DIAGNOSIS — E11.65 TYPE 2 DIABETES MELLITUS WITH HYPERGLYCEMIA, WITHOUT LONG-TERM CURRENT USE OF INSULIN: ICD-10-CM

## 2023-05-15 DIAGNOSIS — F41.9 ANXIETY: ICD-10-CM

## 2023-05-15 DIAGNOSIS — J30.2 SEASONAL ALLERGIC RHINITIS, UNSPECIFIED TRIGGER: ICD-10-CM

## 2023-05-15 DIAGNOSIS — K21.9 GASTROESOPHAGEAL REFLUX DISEASE WITHOUT ESOPHAGITIS: ICD-10-CM

## 2023-05-15 DIAGNOSIS — I10 PRIMARY HYPERTENSION: ICD-10-CM

## 2023-05-15 DIAGNOSIS — E03.9 ACQUIRED HYPOTHYROIDISM: ICD-10-CM

## 2023-05-15 LAB
25(OH)D3 SERPL-MCNC: 37.5 NG/ML (ref 30–100)
ALBUMIN SERPL-MCNC: 4.5 G/DL (ref 3.5–5.2)
ALBUMIN UR-MCNC: 2.1 MG/DL
ALBUMIN/GLOB SERPL: 1.5 G/DL
ALP SERPL-CCNC: 110 U/L (ref 39–117)
ALT SERPL W P-5'-P-CCNC: 35 U/L (ref 1–33)
ANION GAP SERPL CALCULATED.3IONS-SCNC: 13 MMOL/L (ref 5–15)
AST SERPL-CCNC: 35 U/L (ref 1–32)
BASOPHILS # BLD AUTO: 0.05 10*3/MM3 (ref 0–0.2)
BASOPHILS NFR BLD AUTO: 0.8 % (ref 0–1.5)
BILIRUB SERPL-MCNC: 0.3 MG/DL (ref 0–1.2)
BUN SERPL-MCNC: 10 MG/DL (ref 6–20)
BUN/CREAT SERPL: 16.7 (ref 7–25)
CALCIUM SPEC-SCNC: 9.3 MG/DL (ref 8.6–10.5)
CHLORIDE SERPL-SCNC: 98 MMOL/L (ref 98–107)
CHOLEST SERPL-MCNC: 266 MG/DL (ref 0–200)
CO2 SERPL-SCNC: 26 MMOL/L (ref 22–29)
CREAT SERPL-MCNC: 0.6 MG/DL (ref 0.57–1)
DEPRECATED RDW RBC AUTO: 41.8 FL (ref 37–54)
EGFRCR SERPLBLD CKD-EPI 2021: 107.5 ML/MIN/1.73
EOSINOPHIL # BLD AUTO: 0.11 10*3/MM3 (ref 0–0.4)
EOSINOPHIL NFR BLD AUTO: 1.7 % (ref 0.3–6.2)
ERYTHROCYTE [DISTWIDTH] IN BLOOD BY AUTOMATED COUNT: 13.7 % (ref 12.3–15.4)
FERRITIN SERPL-MCNC: 119 NG/ML (ref 13–150)
FOLATE SERPL-MCNC: >20 NG/ML (ref 4.78–24.2)
GLOBULIN UR ELPH-MCNC: 3 GM/DL
GLUCOSE SERPL-MCNC: 179 MG/DL (ref 65–99)
HBA1C MFR BLD: 8.4 % (ref 4.8–5.6)
HCT VFR BLD AUTO: 43.1 % (ref 34–46.6)
HDLC SERPL-MCNC: 39 MG/DL (ref 40–60)
HGB BLD-MCNC: 14.5 G/DL (ref 12–15.9)
IMM GRANULOCYTES # BLD AUTO: 0.05 10*3/MM3 (ref 0–0.05)
IMM GRANULOCYTES NFR BLD AUTO: 0.8 % (ref 0–0.5)
IRON 24H UR-MRATE: 68 MCG/DL (ref 37–145)
IRON SATN MFR SERPL: 12 % (ref 20–50)
LDLC SERPL CALC-MCNC: 127 MG/DL (ref 0–100)
LDLC/HDLC SERPL: 2.99 {RATIO}
LYMPHOCYTES # BLD AUTO: 2.34 10*3/MM3 (ref 0.7–3.1)
LYMPHOCYTES NFR BLD AUTO: 35.1 % (ref 19.6–45.3)
MCH RBC QN AUTO: 28.3 PG (ref 26.6–33)
MCHC RBC AUTO-ENTMCNC: 33.6 G/DL (ref 31.5–35.7)
MCV RBC AUTO: 84 FL (ref 79–97)
MONOCYTES # BLD AUTO: 0.61 10*3/MM3 (ref 0.1–0.9)
MONOCYTES NFR BLD AUTO: 9.2 % (ref 5–12)
NEUTROPHILS NFR BLD AUTO: 3.5 10*3/MM3 (ref 1.7–7)
NEUTROPHILS NFR BLD AUTO: 52.4 % (ref 42.7–76)
NRBC BLD AUTO-RTO: 0 /100 WBC (ref 0–0.2)
PLATELET # BLD AUTO: 276 10*3/MM3 (ref 140–450)
PMV BLD AUTO: 10.2 FL (ref 6–12)
POTASSIUM SERPL-SCNC: 3.9 MMOL/L (ref 3.5–5.2)
PROT SERPL-MCNC: 7.5 G/DL (ref 6–8.5)
RBC # BLD AUTO: 5.13 10*6/MM3 (ref 3.77–5.28)
SODIUM SERPL-SCNC: 137 MMOL/L (ref 136–145)
T4 FREE SERPL-MCNC: 0.99 NG/DL (ref 0.93–1.7)
TIBC SERPL-MCNC: 556 MCG/DL (ref 298–536)
TRANSFERRIN SERPL-MCNC: 373 MG/DL (ref 200–360)
TRIGL SERPL-MCNC: 552 MG/DL (ref 0–150)
TSH SERPL DL<=0.05 MIU/L-ACNC: 2.6 UIU/ML (ref 0.27–4.2)
VIT B12 BLD-MCNC: 421 PG/ML (ref 211–946)
VLDLC SERPL-MCNC: 100 MG/DL (ref 5–40)
WBC NRBC COR # BLD: 6.66 10*3/MM3 (ref 3.4–10.8)

## 2023-05-15 PROCEDURE — 85025 COMPLETE CBC W/AUTO DIFF WBC: CPT | Performed by: NURSE PRACTITIONER

## 2023-05-15 PROCEDURE — 82607 VITAMIN B-12: CPT | Performed by: NURSE PRACTITIONER

## 2023-05-15 PROCEDURE — 83540 ASSAY OF IRON: CPT | Performed by: NURSE PRACTITIONER

## 2023-05-15 PROCEDURE — 82306 VITAMIN D 25 HYDROXY: CPT | Performed by: NURSE PRACTITIONER

## 2023-05-15 PROCEDURE — 82728 ASSAY OF FERRITIN: CPT | Performed by: NURSE PRACTITIONER

## 2023-05-15 PROCEDURE — 84466 ASSAY OF TRANSFERRIN: CPT | Performed by: NURSE PRACTITIONER

## 2023-05-15 PROCEDURE — 83036 HEMOGLOBIN GLYCOSYLATED A1C: CPT | Performed by: NURSE PRACTITIONER

## 2023-05-15 PROCEDURE — 84443 ASSAY THYROID STIM HORMONE: CPT | Performed by: NURSE PRACTITIONER

## 2023-05-15 PROCEDURE — 82043 UR ALBUMIN QUANTITATIVE: CPT | Performed by: NURSE PRACTITIONER

## 2023-05-15 PROCEDURE — 82746 ASSAY OF FOLIC ACID SERUM: CPT | Performed by: NURSE PRACTITIONER

## 2023-05-15 PROCEDURE — 80061 LIPID PANEL: CPT | Performed by: NURSE PRACTITIONER

## 2023-05-15 PROCEDURE — 84439 ASSAY OF FREE THYROXINE: CPT | Performed by: NURSE PRACTITIONER

## 2023-05-15 PROCEDURE — 80053 COMPREHEN METABOLIC PANEL: CPT | Performed by: NURSE PRACTITIONER

## 2023-05-15 RX ORDER — AMLODIPINE BESYLATE 10 MG/1
10 TABLET ORAL DAILY
Qty: 30 TABLET | Refills: 5 | Status: SHIPPED | OUTPATIENT
Start: 2023-05-15

## 2023-05-15 RX ORDER — LEVOTHYROXINE SODIUM 0.03 MG/1
25 TABLET ORAL DAILY
Qty: 30 TABLET | Refills: 5 | Status: SHIPPED | OUTPATIENT
Start: 2023-05-15

## 2023-05-15 RX ORDER — DULOXETIN HYDROCHLORIDE 60 MG/1
60 CAPSULE, DELAYED RELEASE ORAL DAILY
Qty: 30 CAPSULE | Refills: 5 | Status: SHIPPED | OUTPATIENT
Start: 2023-05-15

## 2023-05-15 RX ORDER — PANTOPRAZOLE SODIUM 40 MG/1
40 TABLET, DELAYED RELEASE ORAL DAILY
Qty: 30 TABLET | Refills: 5 | Status: SHIPPED | OUTPATIENT
Start: 2023-05-15

## 2023-05-15 RX ORDER — ERGOCALCIFEROL 1.25 MG/1
50000 CAPSULE ORAL WEEKLY
Qty: 5 CAPSULE | Refills: 5 | Status: SHIPPED | OUTPATIENT
Start: 2023-05-15

## 2023-05-15 RX ORDER — ARIPIPRAZOLE 5 MG/1
5 TABLET ORAL DAILY
Qty: 30 TABLET | Refills: 5 | Status: SHIPPED | OUTPATIENT
Start: 2023-05-15

## 2023-05-15 RX ORDER — TRAZODONE HYDROCHLORIDE 50 MG/1
50 TABLET ORAL NIGHTLY
Qty: 30 TABLET | Refills: 5 | Status: SHIPPED | OUTPATIENT
Start: 2023-05-15

## 2023-05-15 RX ORDER — EZETIMIBE 10 MG/1
10 TABLET ORAL DAILY
Qty: 30 TABLET | Refills: 5 | Status: SHIPPED | OUTPATIENT
Start: 2023-05-15

## 2023-05-15 RX ORDER — MONTELUKAST SODIUM 10 MG/1
10 TABLET ORAL EVERY EVENING
Qty: 30 TABLET | Refills: 5 | Status: SHIPPED | OUTPATIENT
Start: 2023-05-15

## 2023-05-15 RX ORDER — LISINOPRIL 5 MG/1
5 TABLET ORAL DAILY
Qty: 30 TABLET | Refills: 5 | Status: SHIPPED | OUTPATIENT
Start: 2023-05-15

## 2023-05-15 RX ORDER — MELOXICAM 7.5 MG/1
7.5 TABLET ORAL DAILY
Qty: 30 TABLET | Refills: 5 | Status: SHIPPED | OUTPATIENT
Start: 2023-05-15

## 2023-05-15 RX ORDER — IRON POLYSACCHARIDE COMPLEX 150 MG
150 CAPSULE ORAL DAILY
Qty: 90 CAPSULE | Refills: 1 | Status: SHIPPED | OUTPATIENT
Start: 2023-05-15

## 2023-05-15 RX ORDER — HYDROCHLOROTHIAZIDE 12.5 MG/1
12.5 TABLET ORAL DAILY
Qty: 30 TABLET | Refills: 5 | Status: SHIPPED | OUTPATIENT
Start: 2023-05-15

## 2023-05-15 NOTE — PROGRESS NOTES
Answers for HPI/ROS submitted by the patient on 5/12/2023  Please describe your symptoms.: update medicine.  Have you had these symptoms before?: No  How long have you been having these symptoms?: 1-4 days  What is the primary reason for your visit?: Other    Chief Complaint  Hypertension, Follow-up (Fasting for labs /Refills), Hypothyroidism, Hyperlipidemia, and Depression    Subjective          Carlota Robin presents to [facility]  History of Present Illness  She is here for refills and fasting blood work.  Hypertension: She is well controlled with her lisinopril and hydrochlorothiazide.  She also takes her amlodipine daily.  No chest pain or shortness of breath noted.  Her  did just get over COVID and he is doing better but she did not get it.  Vitamin D deficiency and vitamin B12 deficiency: She is taking her vitamin D on a weekly basis.  She needs her B12 checked today.  Allergies: She is taking her Singulair daily.  She also needs a albuterol inhaler just to have on hand if she needs it for her wheezing/cough at times.  She is aware to rinse her mouth out after each use when needed.  Diabetes: She is due an A1c today.  She also is due her microalbumin.  She is taking her metformin once a day.  She has lost 20 pounds in 6 months but has gained it back.    Fibro-: She is taking her Mobic and her Cymbalta on a daily basis.  It is not well controlled currently.  She is having increased pain and shaking of the legs even when she drives.  She is worried that it could be the metformin causing the issues.    Thyroid: She is taking her levothyroxine daily and is due lab work today.  Anemia: She takes her iron on a daily basis and is needing iron profile today along with ferritin to see how her levels are.  Anxiety: She is taking the Cymbalta also for the anxiety and the Abilify is doing well.  No suicidal thoughts or hallucinations.  GERD: She is taking her Tagamet every day  "morning and night.  No nausea or vomiting.    Allergies  Pravastatin sodium, Hydrocodone-acetaminophen, Lisinopril, and Pollen extract    Social History     Tobacco Use   • Smoking status: Never     Passive exposure: Never   • Smokeless tobacco: Never   Vaping Use   • Vaping Use: Never used   Substance Use Topics   • Alcohol use: Never   • Drug use: Never       Family History   Problem Relation Age of Onset   • Stroke Mother    • Diabetes Mother    • Nephrolithiasis Father    • Colon cancer Maternal Grandmother         50S   • Colon cancer Paternal Grandfather         70S   • Breast cancer Other         20S   • Diabetes Maternal Uncle         Health Maintenance Due   Topic Date Due   • Hepatitis B (1 of 3 - 3-dose series) Never done   • COVID-19 Vaccine (1) Never done   • ZOSTER VACCINE (1 of 2) Never done   • ANNUAL PHYSICAL  Never done   • HEMOGLOBIN A1C  03/27/2023        Immunization History   Administered Date(s) Administered   • Influenza, Unspecified 11/25/2019   • Tdap 11/25/2019       Review of Systems   Constitutional: Positive for fatigue.   Respiratory: Negative for cough and shortness of breath.    Cardiovascular: Negative for chest pain.   Gastrointestinal: Negative for diarrhea, nausea and vomiting.   Musculoskeletal: Positive for arthralgias, gait problem and myalgias.        Objective       Vitals:    05/15/23 1005 05/15/23 1014   BP: 140/94 134/96   BP Location: Left arm Left arm   Patient Position: Sitting Sitting   Cuff Size: Large Adult Large Adult   Pulse: 97    Resp: 16    Temp: 98 °F (36.7 °C)    SpO2: 99%    Weight: 94.6 kg (208 lb 8 oz)    Height: 152.4 cm (60\")        Body mass index is 40.72 kg/m².         Physical Exam  Vitals reviewed.   Constitutional:       Appearance: Normal appearance. She is well-developed.   Cardiovascular:      Rate and Rhythm: Normal rate and regular rhythm.      Heart sounds: Normal heart sounds. No murmur heard.  Pulmonary:      Effort: Pulmonary effort is " normal.      Breath sounds: Normal breath sounds.   Neurological:      Mental Status: She is alert and oriented to person, place, and time.      Cranial Nerves: No cranial nerve deficit.      Motor: No weakness.   Psychiatric:         Mood and Affect: Mood and affect normal.             Result Review :     The following data was reviewed by: RAHEEM Burgos on 05/15/2023:    Common Labs   Common labs        9/27/2022    08:34   Common Labs   Glucose 138     BUN 14     Creatinine 0.64     Sodium 142     Potassium 4.2     Chloride 107     Calcium 9.1     Albumin 4.20     Total Bilirubin <0.2     Alkaline Phosphatase 90     AST (SGOT) 17     ALT (SGPT) 15     WBC 6.09     Hemoglobin 13.9     Hematocrit 40.0     Platelets 310     Total Cholesterol 248     Triglycerides 353     HDL Cholesterol 44     LDL Cholesterol  140     Hemoglobin A1C 6.40     Microalbumin, Urine 1.2       A1C   Most Recent A1C        9/27/2022    08:34   HGBA1C Most Recent   Hemoglobin A1C 6.40                      Assessment and Plan      Diagnoses and all orders for this visit:    1. Hyperlipidemia, unspecified hyperlipidemia type (Primary)  -     ezetimibe (Zetia) 10 MG tablet; Take 1 tablet by mouth Daily.  Dispense: 30 tablet; Refill: 5    2. Primary hypertension  -     amLODIPine (NORVASC) 10 MG tablet; Take 1 tablet by mouth Daily.  Dispense: 30 tablet; Refill: 5  -     lisinopril (PRINIVIL,ZESTRIL) 5 MG tablet; Take 1 tablet by mouth Daily.  Dispense: 30 tablet; Refill: 5  -     hydroCHLOROthiazide (HYDRODIURIL) 12.5 MG tablet; Take 1 tablet by mouth Daily.  Dispense: 30 tablet; Refill: 5  -     CBC & Differential  -     Comprehensive Metabolic Panel  -     Lipid Panel  -     Hemoglobin A1c  -     MicroAlbumin, Urine, Random - Urine, Clean Catch    3. Anxiety  -     ARIPiprazole (ABILIFY) 5 MG tablet; Take 1 tablet by mouth Daily.  Dispense: 30 tablet; Refill: 5  -     DULoxetine (CYMBALTA) 60 MG capsule; Take 1 capsule by mouth  Daily.  Dispense: 30 capsule; Refill: 5    4. Gastroesophageal reflux disease without esophagitis  -     cimetidine (TAGAMET) 200 MG tablet; Take 1 tablet by mouth 2 (Two) Times a Day Before Meals.  Dispense: 60 tablet; Refill: 5  -     pantoprazole (PROTONIX) 40 MG EC tablet; Take 1 tablet by mouth Daily.  Dispense: 30 tablet; Refill: 5    5. Other insomnia  -     traZODone (DESYREL) 50 MG tablet; Take 1 tablet by mouth Every Night.  Dispense: 30 tablet; Refill: 5    6. Seasonal allergic rhinitis, unspecified trigger  -     montelukast (SINGULAIR) 10 MG tablet; Take 1 tablet by mouth Every Evening.  Dispense: 30 tablet; Refill: 5  -     albuterol (PROAIR RESPICLICK) 108 (90 Base) MCG/ACT inhaler; Inhale 1 puff Every 4 (Four) Hours As Needed for Wheezing.  Dispense: 1 each; Refill: 0    7. Type 2 diabetes mellitus with hyperglycemia, without long-term current use of insulin  -     metFORMIN (GLUCOPHAGE) 1000 MG tablet; Take 1 tablet by mouth Daily With Breakfast.  Dispense: 30 tablet; Refill: 5  -     CBC & Differential  -     Comprehensive Metabolic Panel  -     Lipid Panel  -     Hemoglobin A1c  -     MicroAlbumin, Urine, Random - Urine, Clean Catch    8. Fibromyalgia  -     meloxicam (MOBIC) 7.5 MG tablet; Take 1 tablet by mouth Daily.  Dispense: 30 tablet; Refill: 5  -     DULoxetine (CYMBALTA) 60 MG capsule; Take 1 capsule by mouth Daily.  Dispense: 30 capsule; Refill: 5  -     Vitamin E 90 MG (200 UNIT) capsule; Take 1 capsule by mouth Daily.  Dispense: 30 capsule; Refill: 5  -     Cancel: Ambulatory Referral to Pain Management  -     Ambulatory Referral to Pain Management    9. Acquired hypothyroidism  -     levothyroxine (SYNTHROID, LEVOTHROID) 25 MCG tablet; Take 1 tablet by mouth Daily.  Dispense: 30 tablet; Refill: 5  -     TSH  -     T4, Free    10. Iron deficiency anemia, unspecified iron deficiency anemia type  -     iron polysaccharides (Ferrex 150) 150 MG capsule; Take 1 capsule by mouth Daily.   Dispense: 90 capsule; Refill: 1  -     Vitamin B12 & Folate  -     Ferritin  -     Iron Profile    11. Vitamin D deficiency  -     Vitamin D,25-Hydroxy  -     vitamin D (ERGOCALCIFEROL) 1.25 MG (01192 UT) capsule capsule; Take 1 capsule by mouth 1 (One) Time Per Week.  Dispense: 5 capsule; Refill: 5    12. Shaking            Follow Up     Return in about 6 months (around 11/15/2023).  Follow-up in 6 months for labs and appt. Call with any concerns or questions that you may have regarding your medications or history.    We will stop metformin to see how the legs get for strengthen and shaking if not better after 2-3 weeks let me know.  We will do a referral to pain mgt for recommendations with the fibro.    Patient was given instructions and counseling regarding her condition or for health maintenance advice. Please see specific information pulled into the AVS if appropriate.     Parts of this note are electronic transcriptions/translations of spoken language to printed text using the Dragon Dictation system.          Audra Tran, RAHEEM  05/15/2023

## 2023-05-18 ENCOUNTER — TELEPHONE (OUTPATIENT)
Dept: FAMILY MEDICINE CLINIC | Facility: CLINIC | Age: 53
End: 2023-05-18

## 2023-05-18 RX ORDER — TIRZEPATIDE 2.5 MG/.5ML
2.5 INJECTION, SOLUTION SUBCUTANEOUS WEEKLY
Qty: 2 ML | Refills: 0 | Status: SHIPPED | OUTPATIENT
Start: 2023-05-18

## 2023-05-18 NOTE — TELEPHONE ENCOUNTER
Caller: Carlota Robin    Relationship: Self    Best call back number: [phone]     What medication are you requesting: TRULICITY    If a prescription is needed, what is your preferred pharmacy and phone number:    [facility] - [place], [place] - [address] - [phone] [facility] [phone]      Additional notes:PATIENT STATES PER CONVERSATION ABOUT LABS AND GETTING STARTED ON AN INJECTION. PATIENT IS REQUESTING THE ABOVE MEDICATION BE PRESCRIBED.       ”

## 2023-05-23 ENCOUNTER — TRANSCRIBE ORDERS (OUTPATIENT)
Dept: ADMINISTRATIVE | Facility: HOSPITAL | Age: 53
End: 2023-05-23
Payer: COMMERCIAL

## 2023-05-23 ENCOUNTER — LAB (OUTPATIENT)
Dept: LAB | Facility: HOSPITAL | Age: 53
End: 2023-05-23
Payer: COMMERCIAL

## 2023-05-23 DIAGNOSIS — G89.29 OTHER CHRONIC PAIN: ICD-10-CM

## 2023-05-23 DIAGNOSIS — G89.29 OTHER CHRONIC PAIN: Primary | ICD-10-CM

## 2023-05-23 LAB
CHROMATIN AB SERPL-ACNC: <10 IU/ML (ref 0–14)
CRP SERPL-MCNC: 1.71 MG/DL (ref 0–0.5)
ERYTHROCYTE [SEDIMENTATION RATE] IN BLOOD: 42 MM/HR (ref 0–30)

## 2023-05-23 PROCEDURE — 86431 RHEUMATOID FACTOR QUANT: CPT

## 2023-05-23 PROCEDURE — 36415 COLL VENOUS BLD VENIPUNCTURE: CPT

## 2023-05-23 PROCEDURE — 86140 C-REACTIVE PROTEIN: CPT

## 2023-05-23 PROCEDURE — 85652 RBC SED RATE AUTOMATED: CPT

## 2023-05-23 PROCEDURE — 86038 ANTINUCLEAR ANTIBODIES: CPT

## 2023-05-24 LAB — ANA SER QL IF: NEGATIVE

## 2023-05-25 ENCOUNTER — TRANSCRIBE ORDERS (OUTPATIENT)
Dept: ADMINISTRATIVE | Facility: HOSPITAL | Age: 53
End: 2023-05-25

## 2023-05-25 DIAGNOSIS — G89.29 CHRONIC PRIMARY GENERALIZED PAIN: Primary | ICD-10-CM

## 2023-05-30 ENCOUNTER — LAB (OUTPATIENT)
Dept: LAB | Facility: HOSPITAL | Age: 53
End: 2023-05-30

## 2023-05-30 DIAGNOSIS — G89.29 CHRONIC PRIMARY GENERALIZED PAIN: ICD-10-CM

## 2023-05-30 LAB
C3 SERPL-MCNC: 198 MG/DL (ref 82–167)
C4 SERPL-MCNC: 37 MG/DL (ref 14–44)
FERRITIN SERPL-MCNC: 107 NG/ML (ref 13–150)

## 2023-05-30 PROCEDURE — 36415 COLL VENOUS BLD VENIPUNCTURE: CPT

## 2023-05-30 PROCEDURE — 86235 NUCLEAR ANTIGEN ANTIBODY: CPT

## 2023-05-30 PROCEDURE — 86200 CCP ANTIBODY: CPT

## 2023-05-30 PROCEDURE — 86160 COMPLEMENT ANTIGEN: CPT

## 2023-05-30 PROCEDURE — 82728 ASSAY OF FERRITIN: CPT

## 2023-05-30 PROCEDURE — 86215 DEOXYRIBONUCLEASE ANTIBODY: CPT

## 2023-05-31 LAB
CCP IGA+IGG SERPL IA-ACNC: 5 UNITS (ref 0–19)
ENA SS-A AB SER-ACNC: <0.2 AI (ref 0–0.9)
ENA SS-B AB SER-ACNC: <0.2 AI (ref 0–0.9)

## 2023-06-02 LAB — STREP DNASE B SER-ACNC: 148 U/ML (ref 0–120)

## 2023-06-09 ENCOUNTER — TELEPHONE (OUTPATIENT)
Dept: FAMILY MEDICINE CLINIC | Facility: CLINIC | Age: 53
End: 2023-06-09

## 2023-06-09 NOTE — TELEPHONE ENCOUNTER
Caller: Carlota Robin    Relationship: Self    Best call back number: [phone]     What is the best time to reach you: ANYTIME     Who are you requesting to speak with (clinical staff, provider,  specific staff member): CLINICAL.       What was the call regarding: PATIENT IS CALLING ABOUT A DIABETIC MEDICATION THAT IS NOT COVERED BY THE INSURANCE UNTIL TRYING AN ALTERNATIVE MEDICATION.

## 2023-07-27 DIAGNOSIS — I10 PRIMARY HYPERTENSION: ICD-10-CM

## 2023-07-27 RX ORDER — HYDROCHLOROTHIAZIDE 12.5 MG/1
12.5 TABLET ORAL DAILY
Qty: 30 TABLET | Refills: 5 | OUTPATIENT
Start: 2023-07-27

## 2023-07-27 NOTE — TELEPHONE ENCOUNTER
Per pharm pt has refills available on file at pharm. Carlota Robin 1970  aware and voiced understanding

## 2023-08-07 ENCOUNTER — TELEPHONE (OUTPATIENT)
Dept: UROLOGY | Facility: CLINIC | Age: 53
End: 2023-08-07
Payer: COMMERCIAL

## 2023-08-07 NOTE — TELEPHONE ENCOUNTER
Spoke with patient scheduling her bladder botox for 9/15/23 at 930. I have mailed her the information.

## 2023-08-07 NOTE — TELEPHONE ENCOUNTER
Hub staff attempted to follow warm transfer process and was unsuccessful     Caller: Carlota Robin    Relationship to patient: Self    Best call back number: [phone]    Patient is needing: PT HAD A CYSTO DONE BACK ON 3/17 AND WAS TOLD SHE WOULD RECEIVE A CALL FOR A 6 MONTH FOLLOW UP BUT NEVER RECEIVED ONE. CALLING TO GET THAT SCHEDULED

## 2023-08-10 ENCOUNTER — OFFICE VISIT (OUTPATIENT)
Dept: FAMILY MEDICINE CLINIC | Facility: CLINIC | Age: 53
End: 2023-08-10
Payer: COMMERCIAL

## 2023-08-10 VITALS
DIASTOLIC BLOOD PRESSURE: 90 MMHG | WEIGHT: 208.25 LBS | OXYGEN SATURATION: 96 % | HEIGHT: 60 IN | BODY MASS INDEX: 40.88 KG/M2 | HEART RATE: 100 BPM | SYSTOLIC BLOOD PRESSURE: 142 MMHG | TEMPERATURE: 97.3 F

## 2023-08-10 DIAGNOSIS — G47.33 OSA (OBSTRUCTIVE SLEEP APNEA): Primary | ICD-10-CM

## 2023-08-10 DIAGNOSIS — I10 PRIMARY HYPERTENSION: ICD-10-CM

## 2023-08-10 DIAGNOSIS — E11.65 TYPE 2 DIABETES MELLITUS WITH HYPERGLYCEMIA, WITHOUT LONG-TERM CURRENT USE OF INSULIN: ICD-10-CM

## 2023-08-10 DIAGNOSIS — E66.01 CLASS 3 SEVERE OBESITY WITH SERIOUS COMORBIDITY AND BODY MASS INDEX (BMI) OF 40.0 TO 44.9 IN ADULT, UNSPECIFIED OBESITY TYPE: ICD-10-CM

## 2023-08-10 PROCEDURE — 3052F HG A1C>EQUAL 8.0%<EQUAL 9.0%: CPT | Performed by: NURSE PRACTITIONER

## 2023-08-10 PROCEDURE — 3077F SYST BP >= 140 MM HG: CPT | Performed by: NURSE PRACTITIONER

## 2023-08-10 PROCEDURE — 1160F RVW MEDS BY RX/DR IN RCRD: CPT | Performed by: NURSE PRACTITIONER

## 2023-08-10 PROCEDURE — 3080F DIAST BP >= 90 MM HG: CPT | Performed by: NURSE PRACTITIONER

## 2023-08-10 PROCEDURE — 99214 OFFICE O/P EST MOD 30 MIN: CPT | Performed by: NURSE PRACTITIONER

## 2023-08-10 PROCEDURE — 1159F MED LIST DOCD IN RCRD: CPT | Performed by: NURSE PRACTITIONER

## 2023-08-10 RX ORDER — GABAPENTIN 100 MG/1
CAPSULE ORAL
COMMUNITY
Start: 2023-07-26

## 2023-08-10 RX ORDER — GABAPENTIN 300 MG/1
CAPSULE ORAL
COMMUNITY
Start: 2023-07-26

## 2023-08-10 RX ORDER — LISINOPRIL 5 MG/1
5 TABLET ORAL 2 TIMES DAILY
Qty: 60 TABLET | Refills: 2 | Status: SHIPPED | OUTPATIENT
Start: 2023-08-10 | End: 2023-08-14 | Stop reason: SINTOL

## 2023-08-10 NOTE — PROGRESS NOTES
Chief Complaint  Hypertension, Diabetes, and needs referrals     Subjective          Carlota Robin presents to [facility]  History of Present Illness  She went to pain management and pain management did blood work and there was the potential for her to having this.  In the process she was referred over to rheumatology and rheumatology told her that she has not had this.  The rheumatologist did tell her that she needed a referral to an endocrinologist to monitor her her sugars for better control as we could not get her medications approved he told her that there is the possibility that they could get it done better than we could.  She also does need to have a new sleep apnea machine.  She said she was diagnosed several years ago but there is been a recall and she has not been using the machine.  She has been checking her blood pressure at home and has been elevated for a while now.  She is able to tolerate low-dose lisinopril without any cough.  She has been off metformin and is feeling overall better.  She is not having any craving of sugars.    Depression: Not at risk    PHQ-2 Score: 0    and 5/15/2023    Class 3 Severe Obesity (BMI >=40). Obesity-related health conditions include the following: hypertension, diabetes mellitus, and dyslipidemias. Obesity is worsening. BMI is is above average; BMI management plan is completed. We discussed portion control and increasing exercise.         Allergies  Pravastatin sodium, Hydrocodone-acetaminophen, Lisinopril, and Pollen extract    Social History     Tobacco Use    Smoking status: Never     Passive exposure: Never    Smokeless tobacco: Never   Vaping Use    Vaping Use: Never used   Substance Use Topics    Alcohol use: Never    Drug use: Never       Family History   Problem Relation Age of Onset    Stroke Mother     Diabetes Mother     Nephrolithiasis Father     Colon cancer Maternal Grandmother         50S    Colon cancer Paternal  "Grandfather         70S    Breast cancer Other         20S    Diabetes Maternal Uncle         Health Maintenance Due   Topic Date Due    ANNUAL PHYSICAL  Never done        Immunization History   Administered Date(s) Administered    Influenza, Unspecified 11/25/2019    Tdap 11/25/2019       Review of Systems   Constitutional:  Positive for fatigue.   Respiratory:  Negative for cough and shortness of breath.    Cardiovascular:  Negative for chest pain.   Gastrointestinal:  Negative for diarrhea, nausea and vomiting.      Objective       Vitals:    08/10/23 1129 08/10/23 1148   BP: (!) 153/108 142/90   BP Location: Left arm    Patient Position: Sitting    Pulse: 100    Temp: 97.3 øF (36.3 øC)    SpO2: 96%    Weight: 94.5 kg (208 lb 4 oz)    Height: 152.4 cm (60\")        Body mass index is 40.67 kg/mý.         Physical Exam  Constitutional:       Appearance: Normal appearance.   HENT:      Head: Normocephalic.   Pulmonary:      Effort: Pulmonary effort is normal.   Skin:     Findings: No bruising.   Neurological:      General: No focal deficit present.      Mental Status: She is alert and oriented to person, place, and time.   Psychiatric:         Mood and Affect: Mood normal.         Behavior: Behavior normal.         Thought Content: Thought content normal.         Judgment: Judgment normal.           Result Review :     The following data was reviewed by: RAHEEM Burgos on 08/10/2023:    Common Labs   Common labs          9/27/2022    08:34 5/15/2023    10:41   Common Labs   Glucose 138  179    BUN 14  10    Creatinine 0.64  0.60    Sodium 142  137    Potassium 4.2  3.9    Chloride 107  98    Calcium 9.1  9.3    Albumin 4.20  4.5    Total Bilirubin <0.2  0.3    Alkaline Phosphatase 90  110    AST (SGOT) 17  35    ALT (SGPT) 15  35    WBC 6.09  6.66    Hemoglobin 13.9  14.5    Hematocrit 40.0  43.1    Platelets 310  276    Total Cholesterol 248  266    Triglycerides 353  552    HDL Cholesterol 44  39  "   LDL Cholesterol  140  127    Hemoglobin A1C 6.40  8.40    Microalbumin, Urine 1.2  2.1      A1C   Most Recent A1C          5/15/2023    10:41   HGBA1C Most Recent   Hemoglobin A1C 8.40                     Assessment and Plan      Diagnoses and all orders for this visit:    1. DANY (obstructive sleep apnea) (Primary)  -     Ambulatory Referral to Sleep Medicine    2. Primary hypertension  -     lisinopril (PRINIVIL,ZESTRIL) 5 MG tablet; Take 1 tablet by mouth 2 (Two) Times a Day.  Dispense: 60 tablet; Refill: 2    3. Type 2 diabetes mellitus with hyperglycemia, without long-term current use of insulin  -     Ambulatory Referral to [facility] [place]    4. Class 3 severe obesity with serious comorbidity and body mass index (BMI) of 40.0 to 44.9 in adult, unspecified obesity type            Follow Up     Return if symptoms worsen or fail to improve.  We will do the referral to the sleep clinic for evaluation of a new machine for her sleep apnea.  We will also refer her to [facility] for their expertise on diabetes and how to get medications approved with insurance.  We will add lisinopril at night with 5 mg low-dose.  She will continue taking her 5 mg in the morning but we will add this to the evening.  Check blood pressures 1 week for the next 2 weeks and keep me posted.  Patient was given instructions and counseling regarding her condition or for health maintenance advice. Please see specific information pulled into the AVS if appropriate.     Parts of this note are electronic transcriptions/translations of spoken language to printed text using the Dragon Dictation system.          RAHEEM Burgos  08/10/2023Answers submitted by the patient for this visit:  Other (Submitted on 8/8/2023)  Please describe your symptoms.: going in for referrals.  Have you had these symptoms before?: Yes  How long have you been having these symptoms?: Greater than 2 weeks  Please list any medications you are  currently taking for this condition.: see my chart.  Primary Reason for Visit (Submitted on 8/8/2023)  What is the primary reason for your visit?: Other

## 2023-08-14 ENCOUNTER — TELEPHONE (OUTPATIENT)
Dept: FAMILY MEDICINE CLINIC | Facility: CLINIC | Age: 53
End: 2023-08-14
Payer: COMMERCIAL

## 2023-08-14 DIAGNOSIS — N32.81 OAB (OVERACTIVE BLADDER): Primary | ICD-10-CM

## 2023-08-14 DIAGNOSIS — N30.00 ACUTE CYSTITIS WITHOUT HEMATURIA: ICD-10-CM

## 2023-08-14 RX ORDER — LOSARTAN POTASSIUM 25 MG/1
25 TABLET ORAL DAILY
COMMUNITY
End: 2023-08-14 | Stop reason: SDUPTHER

## 2023-08-14 RX ORDER — LEVOFLOXACIN 500 MG/1
500 TABLET, FILM COATED ORAL DAILY
Qty: 7 TABLET | Refills: 0 | Status: SHIPPED | OUTPATIENT
Start: 2023-09-12 | End: 2023-09-19

## 2023-08-14 RX ORDER — LOSARTAN POTASSIUM 25 MG/1
25 TABLET ORAL 2 TIMES DAILY
Qty: 60 TABLET | Refills: 1 | Status: SHIPPED | OUTPATIENT
Start: 2023-08-14

## 2023-08-14 NOTE — TELEPHONE ENCOUNTER
Pt called in stating that her BP has been running high it was 170/108 this morning. It has been up the past 3 days and she was wondering if she could have her meds changed. She says she has side effects with lisinopril.Please advise

## 2023-08-21 ENCOUNTER — OFFICE VISIT (OUTPATIENT)
Dept: SLEEP MEDICINE | Facility: HOSPITAL | Age: 53
End: 2023-08-21
Payer: COMMERCIAL

## 2023-08-21 VITALS
DIASTOLIC BLOOD PRESSURE: 83 MMHG | BODY MASS INDEX: 40.8 KG/M2 | OXYGEN SATURATION: 94 % | HEIGHT: 60 IN | HEART RATE: 100 BPM | SYSTOLIC BLOOD PRESSURE: 126 MMHG | WEIGHT: 207.8 LBS

## 2023-08-21 DIAGNOSIS — F32.A DEPRESSION, UNSPECIFIED DEPRESSION TYPE: ICD-10-CM

## 2023-08-21 DIAGNOSIS — I10 ESSENTIAL HYPERTENSION: ICD-10-CM

## 2023-08-21 DIAGNOSIS — G25.81 RESTLESS LEGS SYNDROME (RLS): ICD-10-CM

## 2023-08-21 DIAGNOSIS — R06.81 WITNESSED EPISODE OF APNEA: Primary | ICD-10-CM

## 2023-08-21 DIAGNOSIS — G47.19 EXCESSIVE DAYTIME SLEEPINESS: ICD-10-CM

## 2023-08-21 DIAGNOSIS — R06.83 SNORING: ICD-10-CM

## 2023-08-21 DIAGNOSIS — E66.01 CLASS 3 SEVERE OBESITY WITH BODY MASS INDEX (BMI) OF 40.0 TO 44.9 IN ADULT, UNSPECIFIED OBESITY TYPE, UNSPECIFIED WHETHER SERIOUS COMORBIDITY PRESENT: ICD-10-CM

## 2023-08-21 PROCEDURE — 1160F RVW MEDS BY RX/DR IN RCRD: CPT | Performed by: FAMILY MEDICINE

## 2023-08-21 PROCEDURE — 3079F DIAST BP 80-89 MM HG: CPT | Performed by: FAMILY MEDICINE

## 2023-08-21 PROCEDURE — 99244 OFF/OP CNSLTJ NEW/EST MOD 40: CPT | Performed by: FAMILY MEDICINE

## 2023-08-21 PROCEDURE — 1159F MED LIST DOCD IN RCRD: CPT | Performed by: FAMILY MEDICINE

## 2023-08-21 PROCEDURE — 3074F SYST BP LT 130 MM HG: CPT | Performed by: FAMILY MEDICINE

## 2023-08-21 PROCEDURE — G0463 HOSPITAL OUTPT CLINIC VISIT: HCPCS

## 2023-08-21 NOTE — PROGRESS NOTES
[facility]  [address] [place] [address]  [place]   [place] [address]  Phone: [phone]  Fax: [phone]      Carlota Robin  [id number]   1970  53 y.o.  female      Referring physician/provider and PCP Audra Tran APRN    Type of service: Initial Sleep Medicine Consult.  Date of service: 8/21/2023      Chief Complaint   Patient presents with    Sleep Apnea       History of present illness;  Thank you for asking to see Carlota JAN Robin, 53 y.o. w/ PMHx of HTN, Hypothryoidism, Diabetes, Anxiety, Depression, RLS The patient was seen today on 8/21/2023 at [facility].  The patient presents today with symptoms of snoring, non-restorative sleep and witnessed apneas. The symptoms are present for >5 years and they are persistent in nature.  The snoring is present in all positions and it is loud.  Patient has does not have prior surgery namely tonsillectomy, nasal surgery and UPPP.       -States gained 20 lbs since last sleep study 4 years ago  States she had a recalled [employer] device  - hast not used in 1+ years due to recall  Denies soclean/ozone  Did notice particulate matter in tubing stopped using immediately  (Denies dyspnea/hemoptysis/fevers/chills/night-sweats/unexpected-weight-loss)  Uncertain if she still has device, might be in garage, did not register for recall    -Hx of RLS   Urge to move legs at night  Frequency 3x a week for the past 6 months    takes gabapentin prescribed by pain management for comorbid fibromaylgia   Takes Gabapentin 300 mg qhs   And Morning takes 100 mg   States RLS is similar disruptive to past several months  Takes iron qam OTC after breakfast , takes iron status post dairy products (milk with cereal mainly)    -States her depression and anxiety are well controlled      Patient gives the following sleep history.  Sleep schedule:  Bedtime: 10:30 pm  Sleep Latency: 1 hour to 3 hours will read, TV, plays cards with  in bed   Wake  "time: 5-7 am  Wake after sleep onset: 3-4x   Reasons for awakenings: chronic nocturia, apneas  Average hours of sleep perceives 5 hours  Number of naps per day non-daily 1   Symptoms   In addition to snoring, nonrestorative sleep and witnessed apneas patient gives the following associated symptoms.  Have you ever awakened gasping for breath, coughing, choking: Yes   Change in weight,  Yes 20 lbs weight gain  Morning headaches  No   Awaken with a sore throat or dry mouth  No   Leg jerking at night:  No   Crawly feeling/urge sensation to move in the legs: Yes   Teeth grinding: Yes has seen dentist may consider mouth guard in the future  Have you ever awakened at night with a sour taste or burning sensation in your chest:  No   Do you have muscle weakness with laughing or anger or sleep paralysis:  No   Have you ever felt paralyzed while going to sleep or waking up:  No   Sleepwalking, nightmares, No   Nocturia (urination at night): 3-4 times per night  Memory Problem:Yes       MEDICAL CONDITIONS (PMH)   HTN   Fibromyalgia   Anxiety   Depression  Hypothyroidism    Social history:  Do you drive a commercial vehicle:  No   Shift work:  No   Tobacco use:  No   Alcohol used: denies   Caffeinated drinks: 1x cup tea in the 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   CONSTITUTIONAL:  Non-toxic, In no overt distress   Head: normocephalic   ENT: Mallampati class IV, tongue normal size, no septal defects   NECK:Neck Circumference: 14.5 inches,no nuchal " rigidity  RESPIRATORY SYSTEM: Breath sounds are clear (no rales, no rhonchi, no wheezes), no accessory muscle use  CARDIOVASULAR SYSTEM: Heart sounds are regular rhythm and normal rate, no rub, no gallop, no edema  NEUROLOGICAL SYSTEM: Oriented x 3, No speech defect, gait is normal  PSYCHIATRIC SYSTEM: Goal oriented, affect full range appropriate      Office notes from care team reviewed. Office note dated 8/10/2023 with RAHEEM Tran PCP. 5/14/2019 with Dr. Davion Tidwell ,reviewed  Labs reviewed.  TSH Results:  TSH          9/27/2022    08:34 5/15/2023    10:41   TSH   TSH 3.740  2.600       Most Recent A1C          5/15/2023    10:41   HGBA1C Most Recent   Hemoglobin A1C 8.40     5/15/2023  Ferritin 19  TSAT 12    Assessment and plan:  Witnessed apneas,(R06.81) patient's symptoms and examination is consistent with sleep apnea (G47.30). I have talked to the patient about the signs and symptoms of sleep apnea. In addition, I have also discussed pathophysiology of sleep apnea.  I also discussed the complications of untreated sleep apnea including effects on hypertension, diabetes mellitus and nonrestorative sleep with hypersomnia which can increase risk for motor vehicle accidents.  Untreated sleep apnea is also a risk factor for development of atrial fibrillation, hypertension, insulin resistance and cerebrovascular accident.  Discussed in detail of various testing methods including home-based and lab based sleep studies.  Based on history and physical examination and other comorbidities the most appropriate study is Home Sleep Study as patient has 20 lbs weight gain since last sleep study but with normal epworth of 9/24. The order for the sleep study is placed in [facility].  The test will be scheduled after approval from insurance. Treatment and management will be discussed after the test is completed.  Patient was given opportunity to ask questions and all the questions were answered.   Snoring (R06.83), snoring is  the sound created by turbulent airflow vibrating upper airway soft tissue due to limitation of inspiratory airflow. I have also discussed factors affecting snoring including sleep deprivation, sleeping on the back and alcohol ingestion. To minimize snoring, patient is advised to have adequate sleep, sleep on the side and avoid alcohol and sedative medications before bedtime  Daytime excessive sleepiness .  It was assessed with [place] Sleepiness Scale of Total score: 9.  There are many causes for daytime excessive sleepiness including sleep depression, shiftwork syndrome, depression and other medical disorders including heart, kidney and liver failure.  The most serious cause of excessive sleepiness is due to neurological conditions like narcolepsy/cataplexy.  But the most common cause of excessive sleepiness is due to sleep apnea with frequent awakenings during sleep time.  I have discussed safety of driving and to remain vigilant while driving.  Restless Leg Syndrome Counseled to avoid taking iron supplementation with dairy product. Take iron with vitamin C. Should underlying sleep disordered breathing persist may improve with PAP therapy. Serial monitoring of same.   Obesity , patient's BMI is Body mass index is 40.58 kg/mý.. I have discussed the relationship between weight and sleep apnea.There is direct correlation between weight and severity of sleep apnea.  Weight reduction is encouraged, as it is going to reduce the severity of sleep apnea. I have also discussed with the patient diet and exercise to achieve ideal body weight.  Comorbid HTN, Depression, Anxiety, Diabetes  Patient to follow up with PCP for serial monitoring same, stable in today's sleep clinic visit. Comrobid HTN/Mood disorder would make PAP therapy warranted even with Mild Obstructive Sleep Apnea.    I have also discussed with the patient the following  Sleep hygiene: Maintaining a regular bedtime and wake time, not to watch television or  work in bed, limit caffeine-containing beverages before bed time and avoid naps during the day  Adequate amount of sleep.  Generally most people needs about 7 to 8 hours of sleep.    Return for 31 to 90 days after PAP setup with down load.  Patient's questions were answered      I once again thank you for asking me to see this patient in consultation and I have forwarded my opinion and treatment plan.  Please do not hesitate to call me if you have any questions.     NPI #: [id number]    Alex Tineo, DO  Sleep Medicine  Psychiatric  08/21/23

## 2023-08-23 ENCOUNTER — HOSPITAL ENCOUNTER (OUTPATIENT)
Dept: SLEEP MEDICINE | Facility: HOSPITAL | Age: 53
End: 2023-08-23
Payer: COMMERCIAL

## 2023-08-23 DIAGNOSIS — G47.19 EXCESSIVE DAYTIME SLEEPINESS: ICD-10-CM

## 2023-08-23 DIAGNOSIS — R06.81 WITNESSED EPISODE OF APNEA: ICD-10-CM

## 2023-08-23 DIAGNOSIS — R06.83 SNORING: ICD-10-CM

## 2023-08-23 PROCEDURE — 95806 SLEEP STUDY UNATT&RESP EFFT: CPT | Performed by: FAMILY MEDICINE

## 2023-08-23 PROCEDURE — 95806 SLEEP STUDY UNATT&RESP EFFT: CPT

## 2023-08-25 ENCOUNTER — TELEPHONE (OUTPATIENT)
Dept: SLEEP MEDICINE | Facility: HOSPITAL | Age: 53
End: 2023-08-25
Payer: COMMERCIAL

## 2023-08-25 DIAGNOSIS — G47.34 SLEEP RELATED HYPOXIA: ICD-10-CM

## 2023-08-25 DIAGNOSIS — G47.33 SEVERE OBSTRUCTIVE SLEEP APNEA: Primary | ICD-10-CM

## 2023-09-08 NOTE — PROGRESS NOTES
Chief Complaint  Diabetes (New patient no devices )    Referred By: DONNELL Burgos    Subjective          Carlota MONTOYA Lobito presents to [facility] for diabetes medication management    History of Present Illness    Visit type:  to establish care  Diabetes type:  Type 2  Age at time of dx/Year of dx/Number of years: Reports she was diagnosed with type 2 diabetes about 4 years ago.  Family History of Diabetes: Mother, maternal great-grandmother, and maternal uncle  Current diabetes status/concerns/issues: She was referred to our clinic by her primary care provider for management of her diabetes.  States she was prescribed Ozempic however her insurance did not cover this medication.  She is currently not on any medication for her diabetes.  Reports she has been checking her glucose levels for the past week and her glucose levels are ranging from 367-403.  States in the past she was on metformin at 1000 mg twice daily but she quit taking this medication due to GI distress. States her A1c was in the 6% range in 2022. States she had lost 40lbs at that time and has now regained the wt she lost  Other current health concerns: wears CPAP for sleep apnea-she just started wearing a CPAP 2 wks ago.   Current Diabetes symptoms:    Polyuria: Yes     Polydipsia: Yes     Polyphagia: Yes     Blurred vision: Yes     Excessive fatigue: Yes    Known Diabetes complications:  Neuropathy: None; Location: N/A  Renal: None  Eyes: None; Location: N/A; Last Eye Exam:  2022 ; Location: [facility]  Amputation/Wounds: None  GI: Reflux  Cardiovascular: Hypertension and Hyperlipidemia  ED: N/A  Other: None  Hospitalizations/ED/911 secondary to DM?  No  Hypoglycemia:  None reported at this time  Hypoglycemia Symptoms:  No hypoglycemia at this time  Current Diabetes treatment:  none  Prior diabetes treatments:  metformin  Using ACEI or ARB: Yes, Losartan 25mg qd, Managed by other provider  Using Statin:  No  Blood glucose device:   glucometer  Blood glucose monitoring frequency:   3 times wk  Blood glucose range/average:   367-403  Glucose Source: Patient Reported  Dietary behavior:  Avoids sugary drinks, Number of meals each day - 3; Number of snacks each day - 1-2, states she has not been watching her carbs  Activity/Exercise:  None  Last Foot Exam: None  Diabetes Education Hx: None  Social Determinants of Health: None    Past Medical History:   Diagnosis Date    Acne     Allergic rhinitis due to allergen 04/15/2021    Allergies     Anemia 2020    Anesthesia     HARD TO WAKE UP POSTOP    Anxiety 04/15/2021    Asthma     INHALERS PRN    Bundle branch block, right     SEE'S DR BENNETT IN 2019, NOW SEES ON AS NEEDED BASIS, MAINLY FOLLOW PCP ROUTINELY. DENIED CP/SOB    Depression     Diabetes     Diabetes mellitus, type 2 2020    DOESN'T CHECK BG AT HOME    Essential hypertension 2020    Fatigue 04/15/2021    Fibromyalgia     GERD (gastroesophageal reflux disease) 2020    High cholesterol     Hyperlipidemia 2020    Hypothyroidism 2020    Inguinal hernia     Insomnia     Macromastia     Migraine     Obesity 2020    DANY (obstructive sleep apnea) 2020    Palpitation     NO PROBLEMS CURRENTLY. FOLLOWS PCP    Right bundle branch block 2020    SEE'S DR BLUE ON AS NEEDED BASIS, SEE'S PCP ON YEARLY. DENIED CP/SOB    RLS (restless legs syndrome)     Seasonal allergies     Sinus trouble     Stomach pain 2016    Stress 2016    Thyroid disease     Urinary incontinence     Vitamin B 12 deficiency     Vitamin D deficiency 2020     Past Surgical History:   Procedure Laterality Date    BILATERAL BREAST REDUCTION Bilateral 2022    Procedure: BREAST REDUCTION;  Surgeon: Zeeshan Bone MD;  Location: [facility] [place];  Service: Plastics;  Laterality: Bilateral;    BREAST BIOPSY Right      SECTION      CHOLECYSTECTOMY      COLONOSCOPY      CYSTOSCOPY BOTOX  INJECTION OF BLADDER  11/08/2019    ENDOSCOPY      HERNIA REPAIR      HYSTERECTOMY  05/2017    WISDOM TOOTH EXTRACTION       Family History   Problem Relation Age of Onset    Stroke Mother     Diabetes Mother     Restless legs syndrome Mother     Nephrolithiasis Father     Sleep apnea Father     Restless legs syndrome Sister     Colon cancer Maternal Grandmother         50S    Colon cancer Paternal Grandfather         70S    Diabetes Maternal Uncle     Breast cancer Other         20S    Diabetes Other      Social History     Socioeconomic History    Marital status:    Tobacco Use    Smoking status: Never     Passive exposure: Never    Smokeless tobacco: Never   Vaping Use    Vaping Use: Never used   Substance and Sexual Activity    Alcohol use: Never    Drug use: Never    Sexual activity: Defer     Allergies   Allergen Reactions    Pravastatin Sodium Nausea Only    Hydrocodone-Acetaminophen Hallucinations     OK TO TAKE REGULAR TYLENOL     Lisinopril Cough    Pollen Extract Other (See Comments)     CONGESTION, WATERY EYES, SNEEZING        Current Outpatient Medications:     albuterol (ProAir RespiClick) 108 (90 Base) MCG/ACT inhaler, Inhale 1 puff every 4 (four) hours as needed for wheezing., Disp: 1 g, Rfl: 1    amLODIPine (NORVASC) 10 MG tablet, Take 1 tablet by mouth Daily., Disp: 30 tablet, Rfl: 5    ARIPiprazole (ABILIFY) 5 MG tablet, Take 1 tablet by mouth Daily., Disp: 30 tablet, Rfl: 5    buPROPion XL (WELLBUTRIN XL) 150 MG 24 hr tablet, Take 1 tablet by mouth Daily., Disp: , Rfl:     Calcium Carbonate 1500 (600 Ca) MG tablet, TAKE ONE TABLET BY MOUTH ONCE DAILY, Disp: 30 tablet, Rfl: 4    cetirizine (zyrTEC) 10 MG tablet, Take 1 tablet by mouth Daily., Disp: , Rfl:     cimetidine (TAGAMET) 200 MG tablet, Take 1 tablet by mouth 2 (Two) Times a Day Before Meals., Disp: 60 tablet, Rfl: 5    cyanocobalamin (VITAMIN B-12) 1000 MCG tablet, Vitamin B-12 1,000 mcg oral tablet take 1 tablet by oral route  daily   Active, Disp: , Rfl:     DULoxetine (CYMBALTA) 60 MG capsule, Take 1 capsule by mouth Daily., Disp: 30 capsule, Rfl: 5    ezetimibe (Zetia) 10 MG tablet, Take 1 tablet by mouth Daily., Disp: 30 tablet, Rfl: 5    gabapentin (NEURONTIN) 100 MG capsule, Take 1 capsule twice a day by oral route for 30 days., Disp: , Rfl:     gabapentin (NEURONTIN) 300 MG capsule, Take 1 capsule every day by oral route at bedtime for 30 days., Disp: , Rfl:     hydroCHLOROthiazide (HYDRODIURIL) 12.5 MG tablet, Take 1 tablet by mouth Daily., Disp: 30 tablet, Rfl: 5    iron polysaccharides (Ferrex 150) 150 MG capsule, Take 1 capsule by mouth Daily., Disp: 90 capsule, Rfl: 1    levoFLOXacin (LEVAQUIN) 500 MG tablet, Take 1 tablet by mouth Daily for 7 days. To start on 9/12/23 Tuesday prior to procedure, Disp: 7 tablet, Rfl: 0    levothyroxine (SYNTHROID, LEVOTHROID) 25 MCG tablet, Take 1 tablet by mouth Daily., Disp: 30 tablet, Rfl: 5    losartan (COZAAR) 25 MG tablet, Take 1 tablet by mouth 2 (Two) Times a Day., Disp: 60 tablet, Rfl: 1    meloxicam (MOBIC) 7.5 MG tablet, Take 1 tablet by mouth Daily., Disp: 30 tablet, Rfl: 5    montelukast (SINGULAIR) 10 MG tablet, Take 1 tablet by mouth Every Evening., Disp: 30 tablet, Rfl: 5    pantoprazole (PROTONIX) 40 MG EC tablet, Take 1 tablet by mouth Daily., Disp: 30 tablet, Rfl: 5    promethazine (PHENERGAN) 12.5 MG tablet, Take 1 tablet by mouth Every 6 (Six) Hours As Needed for Nausea or Vomiting., Disp: 20 tablet, Rfl: 0    saccharomyces boulardii (FLORASTOR) 250 MG capsule, Take 1 capsule by mouth 2 (Two) Times a Day., Disp: , Rfl:     solifenacin (VESICARE) 5 MG tablet, Vesicare 5 mg oral tablet take 1 tablet (5 mg) by oral route once daily   Suspended, Disp: , Rfl:     topiramate (TOPAMAX) 50 MG tablet, , Disp: , Rfl:     traZODone (DESYREL) 50 MG tablet, Take 1 tablet by mouth Every Night., Disp: 30 tablet, Rfl: 5    vitamin D (ERGOCALCIFEROL) 1.25 MG (00385 UT) capsule capsule,  Take 1 capsule by mouth 1 (One) Time Per Week., Disp: 5 capsule, Rfl: 5    Vitamin E 90 MG (200 UNIT) capsule, Take 1 capsule by mouth Daily., Disp: 30 capsule, Rfl: 5    ondansetron (Zofran) 4 MG tablet, Take 1 tablet by mouth Every 8 (Eight) Hours As Needed for Nausea or Vomiting., Disp: 30 tablet, Rfl: 0    Semaglutide,0.25 or 0.5MG/DOS, (Ozempic, 0.25 or 0.5 MG/DOSE,) 2 MG/1.5ML solution pen-injector, Inject 0.25 mg under the skin into the appropriate area as directed 1 (One) Time Per Week for 90 days., Disp: 3 mL, Rfl: 0    Semaglutide,0.25 or 0.5MG/DOS, (Ozempic, 0.25 or 0.5 MG/DOSE,) 2 MG/1.5ML solution pen-injector, Inject 0.25 mg under the skin into the appropriate area as directed 1 (One) Time Per Week., Disp: 1.5 mL, Rfl: 0    Objective     Vitals:    09/12/23 1128   BP: 130/80   BP Location: Left arm   Patient Position: Sitting   Cuff Size: Adult   Pulse: 101   SpO2: 95%   Weight: 95.3 kg (210 lb 3.2 oz)     Body mass index is 41.05 kg/m².    Physical Exam  Constitutional:       Appearance: Normal appearance. She is normal weight.   HENT:      Head: Normocephalic and atraumatic.      Right Ear: External ear normal.      Left Ear: External ear normal.      Nose: Nose normal.   Eyes:      Extraocular Movements: Extraocular movements intact.      Conjunctiva/sclera: Conjunctivae normal.   Pulmonary:      Effort: Pulmonary effort is normal.   Musculoskeletal:         General: Normal range of motion.      Cervical back: Normal range of motion.   Skin:     General: Skin is warm and dry.   Neurological:      General: No focal deficit present.      Mental Status: She is alert and oriented to person, place, and time. Mental status is at baseline.   Psychiatric:         Mood and Affect: Mood normal.         Behavior: Behavior normal.         Thought Content: Thought content normal.         Judgment: Judgment normal.           Result Review :   The following data was reviewed by: RAHEEM Garcia on  09/12/2023:    Most Recent A1C          9/12/2023    11:36   HGBA1C Most Recent   Hemoglobin A1C 12.7        A1C Last 3 Results          9/27/2022    08:34 5/15/2023    10:41 9/12/2023    11:36   HGBA1C Last 3 Results   Hemoglobin A1C 6.40  8.40  12.7      A1c collected in the office today is 12.7%, indicating Uncontrolled Type II diabetes.  This result is up from the prior result of 8.4% collected on 5/15/2023    Glucose   Date Value Ref Range Status   09/12/2023 403 (C) 70 - 99 mg/dL Final     Comment:     Serial Number: [id number]:  [id number]     Point of care glucose in the office today is  elevated at 403 mg/dL    Creatinine   Date Value Ref Range Status   05/15/2023 0.60 0.57 - 1.00 mg/dL Final   09/27/2022 0.64 0.57 - 1.00 mg/dL Final     eGFR   Date Value Ref Range Status   05/15/2023 107.5 >60.0 mL/min/1.73 Final   09/27/2022 105.8 >60.0 mL/min/1.73 Final     Comment:     National Kidney Foundation and American Society of Nephrology (ASN) Task Force recommended calculation based on the Chronic Kidney Disease Epidemiology Collaboration (CKD-EPI) equation refit without adjustment for race.     Labs collected on 5/15/2023 show Normal values    Microalbumin, Urine   Date Value Ref Range Status   05/15/2023 2.1 mg/dL Final   09/27/2022 1.2 mg/dL Final     Creatinine, Urine   Date Value Ref Range Status   03/29/2022 99.0 mg/dL Final     Microalbumin/Creatinine Ratio   Date Value Ref Range Status   03/29/2022 44.4 mg/g Final   12/14/2020 15.2 0.0 - 35.0 mg/g[Cre] Final     Urine microalbuminuria collected on 5/15/2023 is negative for microalbuminuria    Total Cholesterol   Date Value Ref Range Status   05/15/2023 266 (H) 0 - 200 mg/dL Final   09/27/2022 248 (H) 0 - 200 mg/dL Final     Triglycerides   Date Value Ref Range Status   05/15/2023 552 (H) 0 - 150 mg/dL Final   09/27/2022 353 (H) 0 - 150 mg/dL Final     HDL Cholesterol   Date Value Ref Range Status   05/15/2023 39 (L) 40 - 60 mg/dL Final    09/27/2022 44 40 - 60 mg/dL Final     LDL Cholesterol    Date Value Ref Range Status   05/15/2023 127 (H) 0 - 100 mg/dL Final   09/27/2022 140 (H) 0 - 100 mg/dL Final     Lipid panel collected on 5/15/2023 shows Hyperlipidemia, Hypertriglyceridemia, and low HDL            Assessment:  Pt was referred to our clinic by her primary care provider for management of her diabetes.  States she was prescribed Ozempic however her insurance did not cover this medication.  She is currently not on any medication for her diabetes.  Reports she has been checking her glucose levels for the past week and her glucose levels are ranging from 367-403mg/dl.  States in the past she was on metformin at 1000 mg twice daily but she quit taking this medication due to GI distress. States her A1c was in the 6% range in 2022. States she had lost 40lbs at that time and has now regained the wt she lost. Admits she was recently diagnosed with DANY and was placed on a CPAP 2 wks ago.  Her A1c in the office today 12.7% indicating controlled type 2 diabetes.  Her glucose level today is 403 mg/dL.       Diagnoses and all orders for this visit:    1. Type 2 diabetes mellitus with hyperglycemia, unspecified whether long term insulin use (Primary)  -     POC Glycosylated Hemoglobin (Hb A1C)  -     Ambulatory Referral to [facility] - [place]  -     Semaglutide,0.25 or 0.5MG/DOS, (Ozempic, 0.25 or 0.5 MG/DOSE,) 2 MG/1.5ML solution pen-injector; Inject 0.25 mg under the skin into the appropriate area as directed 1 (One) Time Per Week for 90 days.  Dispense: 3 mL; Refill: 0  -     Semaglutide,0.25 or 0.5MG/DOS, (Ozempic, 0.25 or 0.5 MG/DOSE,) 2 MG/1.5ML solution pen-injector; Inject 0.25 mg under the skin into the appropriate area as directed 1 (One) Time Per Week.  Dispense: 1.5 mL; Refill: 0    2. Nausea  -     ondansetron (Zofran) 4 MG tablet; Take 1 tablet by mouth Every 8 (Eight) Hours As Needed for Nausea or Vomiting.  Dispense: 30 tablet;  Refill: 0    3. Severe obesity (BMI >= 40)    Other orders  -     POC Glucose        Plan: Discussed starting a long-acting insulin but patient refused stating she does not want to be on insulin.  Also discussed giving a short acting insulin due to her glucose level of 403 mg/dL in the office today however she refused.  Prescribed Ozempic 0.25 mg once weekly x4 weeks then she is to increase to the 0.5 mg dose once weekly.  Instructed patient make sure she is doing this injection on the same day of the week.  She denied any personal or family history of pancreatic or thyroid cancer or pancreatitis.  A demonstration pen was used to teach the patient on use of the Ozempic.  A 1 month sample of Ozempic was given to the patient.  A prescription was sent in for Zofran for as needed nausea.  A referral was placed to the dietitian for carb counting and low-carb low sugar diet.  Discussed adding a CGM for closer monitoring of her glucose levels however patient defers at this time.  Scheduled a 6-week follow-up    The patient will monitor her blood glucose levels once daily.  If she develops problematic hyperglycemia or hypoglycemia or adverse drug reactions, she will contact the office for further instructions.        Follow Up     Return in about 6 weeks (around 10/24/2023) for Medication Mgmt.    Patient was given instructions and counseling regarding her condition or for health maintenance advice. Please see specific information pulled into the AVS if appropriate.     Marilee Laughlin, APRN  09/12/2023      Dictated Utilizing Dragon Dictation.  Please note that portions of this note were completed with a voice recognition program.  Part of this note may be an electronic transcription/translation of spoken language to printed text using the Dragon Dictation System.

## 2023-09-12 ENCOUNTER — LAB (OUTPATIENT)
Dept: LAB | Facility: HOSPITAL | Age: 53
End: 2023-09-12
Payer: COMMERCIAL

## 2023-09-12 ENCOUNTER — OFFICE VISIT (OUTPATIENT)
Dept: DIABETES SERVICES | Facility: HOSPITAL | Age: 53
End: 2023-09-12
Payer: COMMERCIAL

## 2023-09-12 VITALS
SYSTOLIC BLOOD PRESSURE: 130 MMHG | DIASTOLIC BLOOD PRESSURE: 80 MMHG | WEIGHT: 210.2 LBS | BODY MASS INDEX: 41.05 KG/M2 | HEART RATE: 101 BPM | OXYGEN SATURATION: 95 %

## 2023-09-12 DIAGNOSIS — N30.00 ACUTE CYSTITIS WITHOUT HEMATURIA: ICD-10-CM

## 2023-09-12 DIAGNOSIS — E11.65 TYPE 2 DIABETES MELLITUS WITH HYPERGLYCEMIA, UNSPECIFIED WHETHER LONG TERM INSULIN USE: Primary | ICD-10-CM

## 2023-09-12 DIAGNOSIS — E66.01 SEVERE OBESITY (BMI >= 40): ICD-10-CM

## 2023-09-12 DIAGNOSIS — N32.81 OAB (OVERACTIVE BLADDER): ICD-10-CM

## 2023-09-12 DIAGNOSIS — R11.0 NAUSEA: ICD-10-CM

## 2023-09-12 PROBLEM — Z79.4 ENCOUNTER FOR LONG-TERM (CURRENT) USE OF INSULIN: Status: ACTIVE | Noted: 2023-05-23

## 2023-09-12 PROBLEM — G89.29 CHRONIC PAIN: Status: ACTIVE | Noted: 2023-06-27

## 2023-09-12 PROBLEM — G43.009 MIGRAINE WITHOUT AURA AND WITHOUT STATUS MIGRAINOSUS, NOT INTRACTABLE: Status: ACTIVE | Noted: 2022-04-17

## 2023-09-12 PROBLEM — Z78.0 POST-MENOPAUSAL: Status: ACTIVE | Noted: 2022-04-17

## 2023-09-12 PROBLEM — E66.3 OVERWEIGHT: Status: ACTIVE | Noted: 2023-05-23

## 2023-09-12 LAB
EXPIRATION DATE: ABNORMAL
GLUCOSE BLDC GLUCOMTR-MCNC: 403 MG/DL (ref 70–99)
HBA1C MFR BLD: 12.7 %
Lab: ABNORMAL

## 2023-09-12 PROCEDURE — 3079F DIAST BP 80-89 MM HG: CPT | Performed by: NURSE PRACTITIONER

## 2023-09-12 PROCEDURE — 87086 URINE CULTURE/COLONY COUNT: CPT

## 2023-09-12 PROCEDURE — 1160F RVW MEDS BY RX/DR IN RCRD: CPT | Performed by: NURSE PRACTITIONER

## 2023-09-12 PROCEDURE — 1159F MED LIST DOCD IN RCRD: CPT | Performed by: NURSE PRACTITIONER

## 2023-09-12 PROCEDURE — 3046F HEMOGLOBIN A1C LEVEL >9.0%: CPT | Performed by: NURSE PRACTITIONER

## 2023-09-12 PROCEDURE — G0463 HOSPITAL OUTPT CLINIC VISIT: HCPCS | Performed by: NURSE PRACTITIONER

## 2023-09-12 PROCEDURE — 82948 REAGENT STRIP/BLOOD GLUCOSE: CPT | Performed by: NURSE PRACTITIONER

## 2023-09-12 PROCEDURE — 99214 OFFICE O/P EST MOD 30 MIN: CPT | Performed by: NURSE PRACTITIONER

## 2023-09-12 PROCEDURE — 3075F SYST BP GE 130 - 139MM HG: CPT | Performed by: NURSE PRACTITIONER

## 2023-09-12 RX ORDER — ONDANSETRON 4 MG/1
4 TABLET, FILM COATED ORAL EVERY 8 HOURS PRN
Qty: 30 TABLET | Refills: 0 | Status: SHIPPED | OUTPATIENT
Start: 2023-09-12

## 2023-09-12 RX ORDER — SEMAGLUTIDE 1.34 MG/ML
0.25 INJECTION, SOLUTION SUBCUTANEOUS WEEKLY
Qty: 3 ML | Refills: 0 | Status: SHIPPED | OUTPATIENT
Start: 2023-09-12 | End: 2023-12-11

## 2023-09-12 NOTE — PATIENT INSTRUCTIONS
Start Ozempic 0.25 mg once weekly x4 weeks then increase to 0.5 mg once weekly.  Make sure to do this injection on the same day of the week.  A prescription for Zofran has been sent to have on hand for nausea    A referral was placed to the dietitian for carb counting and low-carb/ low sugar diet.

## 2023-09-13 RX ORDER — SEMAGLUTIDE 1.34 MG/ML
0.25 INJECTION, SOLUTION SUBCUTANEOUS WEEKLY
Qty: 1.5 ML | Refills: 0 | COMMUNITY
Start: 2023-09-13

## 2023-09-14 LAB — BACTERIA SPEC AEROBE CULT: NORMAL

## 2023-09-15 ENCOUNTER — PROCEDURE VISIT (OUTPATIENT)
Dept: UROLOGY | Facility: CLINIC | Age: 53
End: 2023-09-15
Payer: COMMERCIAL

## 2023-09-15 VITALS — HEIGHT: 60 IN | BODY MASS INDEX: 41.23 KG/M2 | WEIGHT: 210 LBS

## 2023-09-15 DIAGNOSIS — N32.81 OAB (OVERACTIVE BLADDER): Primary | ICD-10-CM

## 2023-09-15 LAB
BILIRUB BLD-MCNC: NEGATIVE MG/DL
CLARITY, POC: CLEAR
COLOR UR: YELLOW
EXPIRATION DATE: ABNORMAL
GLUCOSE UR STRIP-MCNC: ABNORMAL MG/DL
KETONES UR QL: ABNORMAL
LEUKOCYTE EST, POC: NEGATIVE
Lab: ABNORMAL
NITRITE UR-MCNC: NEGATIVE MG/ML
PH UR: 5 [PH] (ref 5–8)
PROT UR STRIP-MCNC: NEGATIVE MG/DL
RBC # UR STRIP: NEGATIVE /UL
SP GR UR: 1.01 (ref 1–1.03)
UROBILINOGEN UR QL: ABNORMAL

## 2023-09-15 NOTE — PROGRESS NOTES
Bladder Botox    Date/Time: 9/15/2023 10:16 AM  Performed by: Dyan Yuan MD  Authorized by: Dyan Yuan MD   Preparation: Patient was prepped and draped in the usual sterile fashion.  Local anesthesia used: no    Anesthesia:  Local anesthesia used: no    Sedation:  Patient sedated: no    Patient tolerance: patient tolerated the procedure well with no immediate complications  Comments: Dx:  OAB    Lane catheter inserted and 50cc of lidocaine was instilled and allowed to remain for 20 minutes.  Lane catheter was removed. The flexible cystoscope was inserted. Bladder was inspected and no abnormalities seen. 20 injection sites on the posterior bladder wall were injected with 0.5 cc of botox for a total of 100 units. No complications. The cystoscope was removed. The patient tolerated the procedure well with no bleeding.

## 2023-09-18 ENCOUNTER — NUTRITION (OUTPATIENT)
Dept: DIABETES SERVICES | Facility: HOSPITAL | Age: 53
End: 2023-09-18
Payer: COMMERCIAL

## 2023-09-18 DIAGNOSIS — E11.65 TYPE 2 DIABETES MELLITUS WITH HYPERGLYCEMIA, WITHOUT LONG-TERM CURRENT USE OF INSULIN: Primary | ICD-10-CM

## 2023-09-18 PROCEDURE — 97802 MEDICAL NUTRITION INDIV IN: CPT | Performed by: DIETITIAN, REGISTERED

## 2023-10-05 DIAGNOSIS — J30.2 SEASONAL ALLERGIC RHINITIS, UNSPECIFIED TRIGGER: ICD-10-CM

## 2023-10-05 RX ORDER — ALBUTEROL SULFATE 90 UG/1
POWDER, METERED RESPIRATORY (INHALATION)
Qty: 1 G | Refills: 1 | Status: SHIPPED | OUTPATIENT
Start: 2023-10-05

## 2023-10-06 ENCOUNTER — OFFICE VISIT (OUTPATIENT)
Dept: DIABETES SERVICES | Facility: HOSPITAL | Age: 53
End: 2023-10-06
Payer: COMMERCIAL

## 2023-10-06 ENCOUNTER — TELEPHONE (OUTPATIENT)
Dept: DIABETES SERVICES | Facility: HOSPITAL | Age: 53
End: 2023-10-06

## 2023-10-06 VITALS
BODY MASS INDEX: 41.23 KG/M2 | TEMPERATURE: 98.1 F | OXYGEN SATURATION: 95 % | DIASTOLIC BLOOD PRESSURE: 89 MMHG | HEART RATE: 97 BPM | HEIGHT: 60 IN | SYSTOLIC BLOOD PRESSURE: 140 MMHG | WEIGHT: 210 LBS

## 2023-10-06 DIAGNOSIS — E11.65 UNCONTROLLED TYPE 2 DIABETES MELLITUS WITH HYPERGLYCEMIA: Primary | ICD-10-CM

## 2023-10-06 DIAGNOSIS — E11.65 TYPE 2 DIABETES MELLITUS WITH HYPERGLYCEMIA, UNSPECIFIED WHETHER LONG TERM INSULIN USE: ICD-10-CM

## 2023-10-06 DIAGNOSIS — E66.01 SEVERE OBESITY (BMI >= 40): ICD-10-CM

## 2023-10-06 LAB
EXPIRATION DATE: ABNORMAL
GLUCOSE BLDC GLUCOMTR-MCNC: 367 MG/DL (ref 70–99)
GLUCOSE BLDC GLUCOMTR-MCNC: 405 MG/DL (ref 70–99)
HBA1C MFR BLD: 12.5 %
Lab: ABNORMAL

## 2023-10-06 PROCEDURE — 82948 REAGENT STRIP/BLOOD GLUCOSE: CPT | Performed by: NURSE PRACTITIONER

## 2023-10-06 PROCEDURE — G0463 HOSPITAL OUTPT CLINIC VISIT: HCPCS | Performed by: NURSE PRACTITIONER

## 2023-10-06 RX ORDER — INSULIN GLARGINE 300 U/ML
10 INJECTION, SOLUTION SUBCUTANEOUS DAILY
Qty: 1 ML | Refills: 5 | Status: SHIPPED | OUTPATIENT
Start: 2023-10-06 | End: 2024-04-03

## 2023-10-06 RX ORDER — PEN NEEDLE, DIABETIC 31 GX5/16"
1 NEEDLE, DISPOSABLE MISCELLANEOUS DAILY
Qty: 30 EACH | Refills: 5 | Status: SHIPPED | OUTPATIENT
Start: 2023-10-06 | End: 2023-11-05

## 2023-10-06 RX ORDER — BLOOD-GLUCOSE SENSOR
1 EACH MISCELLANEOUS
Qty: 2 EACH | Refills: 5 | Status: SHIPPED | OUTPATIENT
Start: 2023-10-06

## 2023-10-06 RX ORDER — INSULIN GLARGINE 300 U/ML
10 INJECTION, SOLUTION SUBCUTANEOUS DAILY
Qty: 1.5 ML | Refills: 0 | COMMUNITY
Start: 2023-10-06

## 2023-10-06 NOTE — PATIENT INSTRUCTIONS
Start Toujeo 10 units once daily.  I want you to increase by 3 units every 3 days until your fasting blood sugars less than 150 then remain on that dose.  For example today we started 10 units once daily if in 3 days your fasting blood sugar remains above 150 increase to 13 units continue this process every 3 days until your fasting blood sugar is less than 150.    A tenzin 3 was placed on the left posterior arm and the loretta was downloaded.  A prescription for the tenzin 3 was sent to your pharmacy.

## 2023-10-06 NOTE — TELEPHONE ENCOUNTER
Pt has concerns of high blood glucoses. her blood sugars are staying in the rang of 300-375. Last blood glucose check it was 350. She is very worried about her viosion as it is getting blurrier. She has done two weeks of 0.25 mg ozempic, and 1 week of 0.50 mg ozempic. Pt is requesting provider to call her back to discuss questions and concerns.   [phone] ok to leave voicemail, but pt is waiting for a call.

## 2023-10-06 NOTE — PROGRESS NOTES
Chief Complaint  Diabetes (Same day -emergency visit )    Referred By: DONNELL Burgos    Subjective          Carlota MONTOYA Lobito presents to [facility] for diabetes medication management    History of Present Illness    Visit type:  follow-up  Diabetes type:  Type 2  Current diabetes status/concerns/issues:  Patient is here today to follow-up on her diabetes.  She was started on Ozempic 0.25 mg once weekly x4 weeks then increase to 0.5 mg once weekly.  She reports her glucose levels are still staying above 300 despite adding Ozempic.  Last visit she did not want to start insulin however she reports she is having some blurred vision and is willing to start insulin at this time. States her blood sugar this am was 350mg/dl.   Other health concerns: She admits she is having some blurred vision from her high glucose levels.  Current Diabetes symptoms:    Polyuria: Yes     Polydipsia: No   Polyphagia: No   Blurred vision: Yes     Excessive fatigue: Yes    Known Diabetes complications:  Neuropathy: None; Location: N/A  Renal: None  Eyes: None; Location: N/A; Last Eye Exam:  2022 ; Location: [facility]  Amputation/Wounds: None  GI: Reflux  Cardiovascular: Hypertension and Hyperlipidemia  ED: N/A  Other: None  Hypoglycemia:  None reported at this time  Hypoglycemia Symptoms:  No hypoglycemia at this time  Current diabetes treatment:  Ozempic 0.5 mg once weekly  Blood glucose device:  Meter  Blood glucose monitoring frequency:  2  Blood glucose range/average:   fasting 297-397 nonfasting 300-349  Glucose Source: Patient Reported  Dietary behavior:  Avoids sugary drinks, Number of meals each day - 3; Number of snacks each day - 1-2, states she has not been watching her carbs  Activity/Exercise:  None  Past Medical History:   Diagnosis Date    Acne     Allergic rhinitis due to allergen 04/15/2021    Allergies     Anemia 12/03/2020    Anesthesia     HARD TO WAKE UP POSTOP    Anxiety  04/15/2021    Asthma     INHALERS PRN    Bundle branch block, right     SEE'S DR BENNETT IN 2019, NOW SEES ON AS NEEDED BASIS, MAINLY FOLLOW PCP ROUTINELY. DENIED CP/SOB    Depression     Diabetes     Diabetes mellitus, type 2 2020    DOESN'T CHECK BG AT HOME    Essential hypertension 2020    Fatigue 04/15/2021    Fibromyalgia     GERD (gastroesophageal reflux disease) 2020    High cholesterol     Hyperlipidemia 2020    Hypothyroidism 2020    Inguinal hernia     Insomnia     Macromastia     Migraine     Obesity 2020    DANY (obstructive sleep apnea) 2020    Palpitation     NO PROBLEMS CURRENTLY. FOLLOWS PCP    Right bundle branch block 2020    SEE'S DR BLUE ON AS NEEDED BASIS, SEE'S PCP ON YEARLY. DENIED CP/SOB    RLS (restless legs syndrome)     Seasonal allergies     Sinus trouble     Stomach pain 2016    Stress 2016    Thyroid disease     Urinary incontinence     Vitamin B 12 deficiency     Vitamin D deficiency 2020     Past Surgical History:   Procedure Laterality Date    BILATERAL BREAST REDUCTION Bilateral 2022    Procedure: BREAST REDUCTION;  Surgeon: Zeeshan Bone MD;  [place] [facility] [place] [facility] [place]: [place];  Laterality: Bilateral;    BREAST BIOPSY Right      SECTION      CHOLECYSTECTOMY      COLONOSCOPY      CYSTOSCOPY BOTOX INJECTION OF BLADDER  2019    ENDOSCOPY      HERNIA REPAIR      HYSTERECTOMY  2017    WISDOM TOOTH EXTRACTION       Family History   Problem Relation Age of Onset    Stroke Mother     Diabetes Mother     Restless legs syndrome Mother     Nephrolithiasis Father     Sleep apnea Father     Restless legs syndrome Sister     Colon cancer Maternal Grandmother         50S    Colon cancer Paternal Grandfather         70S    Diabetes Maternal Uncle     Breast cancer Other         20S    Diabetes Other      Social History     Socioeconomic History    Marital status:    Tobacco Use    Smoking status:  Never     Passive exposure: Never    Smokeless tobacco: Never   Vaping Use    Vaping Use: Never used   Substance and Sexual Activity    Alcohol use: Never    Drug use: Never    Sexual activity: Defer     Allergies   Allergen Reactions    Pravastatin Sodium Nausea Only    Hydrocodone-Acetaminophen Hallucinations     OK TO TAKE REGULAR TYLENOL     Lisinopril Cough    Pollen Extract Other (See Comments)     CONGESTION, WATERY EYES, SNEEZING        Current Outpatient Medications:     amLODIPine (NORVASC) 10 MG tablet, Take 1 tablet by mouth Daily., Disp: 30 tablet, Rfl: 5    ARIPiprazole (ABILIFY) 5 MG tablet, Take 1 tablet by mouth Daily., Disp: 30 tablet, Rfl: 5    buPROPion XL (WELLBUTRIN XL) 150 MG 24 hr tablet, Take 1 tablet by mouth Daily., Disp: , Rfl:     Calcium Carbonate 1500 (600 Ca) MG tablet, TAKE ONE TABLET BY MOUTH ONCE DAILY, Disp: 30 tablet, Rfl: 4    cetirizine (zyrTEC) 10 MG tablet, Take 1 tablet by mouth Daily., Disp: , Rfl:     cimetidine (TAGAMET) 200 MG tablet, Take 1 tablet by mouth 2 (Two) Times a Day Before Meals., Disp: 60 tablet, Rfl: 5    cyanocobalamin (VITAMIN B-12) 1000 MCG tablet, Vitamin B-12 1,000 mcg oral tablet take 1 tablet by oral route daily   Active, Disp: , Rfl:     DULoxetine (CYMBALTA) 60 MG capsule, Take 1 capsule by mouth Daily., Disp: 30 capsule, Rfl: 5    ezetimibe (Zetia) 10 MG tablet, Take 1 tablet by mouth Daily., Disp: 30 tablet, Rfl: 5    gabapentin (NEURONTIN) 100 MG capsule, Take 1 capsule twice a day by oral route for 30 days., Disp: , Rfl:     gabapentin (NEURONTIN) 300 MG capsule, Take 1 capsule every day by oral route at bedtime for 30 days., Disp: , Rfl:     hydroCHLOROthiazide (HYDRODIURIL) 12.5 MG tablet, Take 1 tablet by mouth Daily., Disp: 30 tablet, Rfl: 5    iron polysaccharides (Ferrex 150) 150 MG capsule, Take 1 capsule by mouth Daily., Disp: 90 capsule, Rfl: 1    levothyroxine (SYNTHROID, LEVOTHROID) 25 MCG tablet, Take 1 tablet by mouth Daily., Disp:  30 tablet, Rfl: 5    losartan (COZAAR) 25 MG tablet, Take 1 tablet by mouth 2 (Two) Times a Day., Disp: 60 tablet, Rfl: 1    meloxicam (MOBIC) 7.5 MG tablet, Take 1 tablet by mouth Daily., Disp: 30 tablet, Rfl: 5    montelukast (SINGULAIR) 10 MG tablet, Take 1 tablet by mouth Every Evening., Disp: 30 tablet, Rfl: 5    ondansetron (Zofran) 4 MG tablet, Take 1 tablet by mouth Every 8 (Eight) Hours As Needed for Nausea or Vomiting., Disp: 30 tablet, Rfl: 0    pantoprazole (PROTONIX) 40 MG EC tablet, Take 1 tablet by mouth Daily., Disp: 30 tablet, Rfl: 5    ProAir RespiClick 108 (90 Base) MCG/ACT inhaler, Inhale 1 puff every 4 (four) hours as needed for wheezing., Disp: 1 g, Rfl: 1    promethazine (PHENERGAN) 12.5 MG tablet, Take 1 tablet by mouth Every 6 (Six) Hours As Needed for Nausea or Vomiting., Disp: 20 tablet, Rfl: 0    saccharomyces boulardii (FLORASTOR) 250 MG capsule, Take 1 capsule by mouth 2 (Two) Times a Day., Disp: , Rfl:     Semaglutide,0.25 or 0.5MG/DOS, (Ozempic, 0.25 or 0.5 MG/DOSE,) 2 MG/1.5ML solution pen-injector, Inject 0.25 mg under the skin into the appropriate area as directed 1 (One) Time Per Week for 90 days., Disp: 3 mL, Rfl: 0    solifenacin (VESICARE) 5 MG tablet, Vesicare 5 mg oral tablet take 1 tablet (5 mg) by oral route once daily   Suspended, Disp: , Rfl:     topiramate (TOPAMAX) 50 MG tablet, , Disp: , Rfl:     traZODone (DESYREL) 50 MG tablet, Take 1 tablet by mouth Every Night., Disp: 30 tablet, Rfl: 5    vitamin D (ERGOCALCIFEROL) 1.25 MG (91370 UT) capsule capsule, Take 1 capsule by mouth 1 (One) Time Per Week., Disp: 5 capsule, Rfl: 5    Vitamin E 90 MG (200 UNIT) capsule, Take 1 capsule by mouth Daily., Disp: 30 capsule, Rfl: 5    Continuous Blood Gluc Sensor (FreeStyle Harley 3 Sensor) misc, Use 1 each Every 14 (Fourteen) Days., Disp: 2 each, Rfl: 5    Insulin Glargine, 1 Unit Dial, (Toujeo SoloStar) 300 UNIT/ML solution pen-injector injection, Inject 10 Units under the skin  "into the appropriate area as directed Daily for 180 days., Disp: 1 mL, Rfl: 5    Insulin Glargine, 1 Unit Dial, (Toujeo SoloStar) 300 UNIT/ML solution pen-injector injection, Inject 10 Units under the skin into the appropriate area as directed Daily., Disp: 1.5 mL, Rfl: 0    Insulin Pen Needle (HealthWise Micron Pen Needles) 32G X 4 MM misc, Use 1 each Daily for 30 days., Disp: 30 each, Rfl: 5    Semaglutide,0.25 or 0.5MG/DOS, (Ozempic, 0.25 or 0.5 MG/DOSE,) 2 MG/1.5ML solution pen-injector, Inject 0.25 mg under the skin into the appropriate area as directed 1 (One) Time Per Week. (Patient not taking: Reported on 10/6/2023), Disp: 1.5 mL, Rfl: 0    Objective     Vitals:    10/06/23 1613   BP: 140/89   BP Location: Left arm   Patient Position: Sitting   Cuff Size: Adult   Pulse: 97   Temp: 98.1 °F (36.7 °C)   SpO2: 95%   Weight: 95.3 kg (210 lb)   Height: 152.4 cm (60\")   PainSc:   2     Body mass index is 41.01 kg/m².    Physical Exam  Constitutional:       Appearance: Normal appearance. She is obese.      Comments: Severe Obesity (BMI >= 40) Pt Current BMI = 41.01      HENT:      Head: Normocephalic and atraumatic.      Right Ear: External ear normal.      Left Ear: External ear normal.      Nose: Nose normal.   Eyes:      Extraocular Movements: Extraocular movements intact.      Conjunctiva/sclera: Conjunctivae normal.   Pulmonary:      Effort: Pulmonary effort is normal.   Musculoskeletal:         General: Normal range of motion.      Cervical back: Normal range of motion.   Skin:     General: Skin is warm and dry.   Neurological:      General: No focal deficit present.      Mental Status: She is alert and oriented to person, place, and time. Mental status is at baseline.   Psychiatric:         Mood and Affect: Mood normal. Mood is anxious.         Behavior: Behavior normal.         Thought Content: Thought content normal.         Judgment: Judgment normal.             Result Review :   The following data was " reviewed by: RAHEEM Garcia on 10/06/2023:    Most Recent A1C          10/6/2023    16:23   HGBA1C Most Recent   Hemoglobin A1C 12.5        A1C Last 3 Results          5/15/2023    10:41 9/12/2023    11:36 10/6/2023    16:23   HGBA1C Last 3 Results   Hemoglobin A1C 8.40  12.7  12.5      A1c collected in the office today is 12.5%, indicating Uncontrolled Type II diabetes.  This result is down from the prior result of 12.7% collected on 9/12/23     Glucose   Date Value Ref Range Status   10/06/2023 405 (C) 70 - 99 mg/dL Final     Comment:     Serial Number: [id number]:  [id number]   09/22/2023 394 (H) 70 - 110 mg/dL Final     Point of care glucose in the office today is  elevated at 405mg/dl    Creatinine   Date Value Ref Range Status   05/15/2023 0.60 0.57 - 1.00 mg/dL Final   09/27/2022 0.64 0.57 - 1.00 mg/dL Final     eGFR   Date Value Ref Range Status   05/15/2023 107.5 >60.0 mL/min/1.73 Final   09/27/2022 105.8 >60.0 mL/min/1.73 Final     Comment:     National Kidney Foundation and American Society of Nephrology (ASN) Task Force recommended calculation based on the Chronic Kidney Disease Epidemiology Collaboration (CKD-EPI) equation refit without adjustment for race.     Labs collected on 5/15/23 show Normal values    Microalbumin, Urine   Date Value Ref Range Status   05/15/2023 2.1 mg/dL Final   09/27/2022 1.2 mg/dL Final     Creatinine, Urine   Date Value Ref Range Status   03/29/2022 99.0 mg/dL Final     Microalbumin/Creatinine Ratio   Date Value Ref Range Status   03/29/2022 44.4 mg/g Final   12/14/2020 15.2 0.0 - 35.0 mg/g[Cre] Final     Urine microalbuminuria collected on 5/15/23 is negative for microalbuminuria    Total Cholesterol   Date Value Ref Range Status   05/15/2023 266 (H) 0 - 200 mg/dL Final   09/27/2022 248 (H) 0 - 200 mg/dL Final     Triglycerides   Date Value Ref Range Status   05/15/2023 552 (H) 0 - 150 mg/dL Final   09/27/2022 353 (H) 0 - 150 mg/dL Final     HDL  Cholesterol   Date Value Ref Range Status   05/15/2023 39 (L) 40 - 60 mg/dL Final   09/27/2022 44 40 - 60 mg/dL Final     LDL Cholesterol    Date Value Ref Range Status   05/15/2023 127 (H) 0 - 100 mg/dL Final   09/27/2022 140 (H) 0 - 100 mg/dL Final     Lipid panel collected on 5/15/23 shows Hyperlipidemia, Hypercholesterolemia, and Hypertriglyceridemia            Assessment: Pt is here today to discuss her high blood sugars despite starting Ozempic. Last visit she deferred starting insulin but after discussion with the patient on the risks of hyperglycemia and her new onset of blurred vision she is agreeable. Her blood sugar on arrival today was 405mg/dl. Her A1c in the office today is 12.5% indicating uncontrolled type 2 diabetes.       Diagnoses and all orders for this visit:    1. Uncontrolled type 2 diabetes mellitus with hyperglycemia (Primary)  -     POC Glycosylated Hemoglobin (Hb A1C)  -     Insulin Glargine, 1 Unit Dial, (Toujeo SoloStar) 300 UNIT/ML solution pen-injector injection; Inject 10 Units under the skin into the appropriate area as directed Daily for 180 days.  Dispense: 1 mL; Refill: 5  -     Continuous Blood Gluc Sensor (FreeStyle Harley 3 Sensor) misc; Use 1 each Every 14 (Fourteen) Days.  Dispense: 2 each; Refill: 5  -     Insulin Pen Needle (HealthWise Micron Pen Needles) 32G X 4 MM misc; Use 1 each Daily for 30 days.  Dispense: 30 each; Refill: 5  -     Insulin Glargine, 1 Unit Dial, (Toujeo SoloStar) 300 UNIT/ML solution pen-injector injection; Inject 10 Units under the skin into the appropriate area as directed Daily.  Dispense: 1.5 mL; Refill: 0    2. Type 2 diabetes mellitus with hyperglycemia, unspecified whether long term insulin use  -     Insulin Glargine, 1 Unit Dial, (Toujeo SoloStar) 300 UNIT/ML solution pen-injector injection; Inject 10 Units under the skin into the appropriate area as directed Daily for 180 days.  Dispense: 1 mL; Refill: 5  -     Continuous Blood Gluc Sensor  (FreeStyle Harley 3 Sensor) misc; Use 1 each Every 14 (Fourteen) Days.  Dispense: 2 each; Refill: 5  -     Insulin Pen Needle (HealthWise Micron Pen Needles) 32G X 4 MM misc; Use 1 each Daily for 30 days.  Dispense: 30 each; Refill: 5  -     Insulin Glargine, 1 Unit Dial, (Toujeo SoloStar) 300 UNIT/ML solution pen-injector injection; Inject 10 Units under the skin into the appropriate area as directed Daily.  Dispense: 1.5 mL; Refill: 0    3. Severe obesity (BMI >= 40)    Other orders  -     POC Glucose  -     POC Glucose        Plan: A sample of Toujeo was used to administer 10 units of insulin. The teach back method was used and she was able to do a return demonstration. She was very nervous about starting insulin but was willing due to the risks of hyperglycemia. Sent pt the sample Toujeo pen-instructed her to injectio 10 units qd. Instructed pt to taper up by 3 units q 3 days until her fbs is less than 150mg/dl then she is to remain on that dose. A Harley 3 was applied to the left posterior arm for closer monitoring of her glucose levels. The loretta was downloaded for the pt. Toujeo and the Harley 3 was sent to her pharmacy. Instructed pt to continue Ozempic as previously prescribed. Scheduled a 1 month follow up.    The patient will monitor her blood glucose levels per CGM.  If she develops problematic hyperglycemia or hypoglycemia or adverse drug reactions, she will contact the office for further instructions.        Follow Up     Return in about 4 weeks (around 11/3/2023) for Medication Mgmt, CGM Follow Up.    Patient was given instructions and counseling regarding her condition or for health maintenance advice. Please see specific information pulled into the AVS if appropriate.     Marilee Laughlin, RAHEEM  10/06/2023      Dictated Utilizing Dragon Dictation.  Please note that portions of this note were completed with a voice recognition program.  Part of this note may be an electronic transcription/translation  of spoken language to printed text using the Dragon Dictation System.

## 2023-10-09 ENCOUNTER — TELEPHONE (OUTPATIENT)
Dept: DIABETES SERVICES | Facility: HOSPITAL | Age: 53
End: 2023-10-09
Payer: COMMERCIAL

## 2023-10-09 LAB — GLUCOSE BLDC GLUCOMTR-MCNC: 390 MG/DL (ref 70–99)

## 2023-10-09 NOTE — TELEPHONE ENCOUNTER
Carlota Robin Key: [id number] - PA Case ID: [id number] - Rx #: [id number]  Toujeo SoloStar 300UNIT/ML pen-injectorsApprovedtoday  The request has been approved. The authorization is effective from 10/09/2023 to 10/08/2024, as long as the member is enrolled in their current health plan. The request was approved as submitted. A written notification letter will follow with additional details.        Carlota Robin Key: [id number] - [place] Case ID: [id number] - Rx #: [id number]  FreeStyle Harley 3 SensorApprovedtoday  The request has been approved. The authorization is effective from 10/09/2023 to 10/08/2024, as long as the member is enrolled in their current health plan. The request was approved as submitted. A written notification letter will follow with additional details.

## 2023-10-16 RX ORDER — LOSARTAN POTASSIUM 25 MG/1
25 TABLET ORAL 2 TIMES DAILY
Qty: 60 TABLET | Refills: 3 | Status: SHIPPED | OUTPATIENT
Start: 2023-10-16

## 2023-10-27 ENCOUNTER — OFFICE VISIT (OUTPATIENT)
Dept: SLEEP MEDICINE | Facility: HOSPITAL | Age: 53
End: 2023-10-27
Payer: COMMERCIAL

## 2023-10-27 VITALS
SYSTOLIC BLOOD PRESSURE: 123 MMHG | BODY MASS INDEX: 40.95 KG/M2 | WEIGHT: 208.6 LBS | HEART RATE: 98 BPM | HEIGHT: 60 IN | DIASTOLIC BLOOD PRESSURE: 77 MMHG | OXYGEN SATURATION: 94 %

## 2023-10-27 DIAGNOSIS — G47.34 SLEEP RELATED HYPOXIA: ICD-10-CM

## 2023-10-27 DIAGNOSIS — G47.33 SEVERE OBSTRUCTIVE SLEEP APNEA: Primary | ICD-10-CM

## 2023-10-27 DIAGNOSIS — E66.01 CLASS 3 SEVERE OBESITY WITH BODY MASS INDEX (BMI) OF 40.0 TO 44.9 IN ADULT, UNSPECIFIED OBESITY TYPE, UNSPECIFIED WHETHER SERIOUS COMORBIDITY PRESENT: ICD-10-CM

## 2023-10-27 DIAGNOSIS — G25.81 RESTLESS LEGS SYNDROME (RLS): ICD-10-CM

## 2023-10-27 PROCEDURE — G0463 HOSPITAL OUTPT CLINIC VISIT: HCPCS

## 2023-10-27 RX ORDER — BLOOD-GLUCOSE METER
KIT MISCELLANEOUS
COMMUNITY
Start: 2023-09-22

## 2023-10-27 RX ORDER — LANCETS 28 GAUGE
EACH MISCELLANEOUS
COMMUNITY
Start: 2023-09-22

## 2023-10-27 NOTE — PROGRESS NOTES
[address] [address]  [place]   [place] [address]  Phone: [phone]  Fax: [phone]      SLEEP CLINIC FOLLOW UP PROGRESS NOTE.    Carlota Robin  [id number]   1970  53 y.o.  female      PCP: Audra Tran APRN      Date of visit: 10/27/2023    Chief Complaint: Sleep Apnea    Medications and allergies are reviewed by me and documented in the encounter.     SOCIAL (habits pertaining to sleep medicine)  History tobacco use:No   History of alcohol use: 0 per week  Caffeine use: 1 tea per day    HPI:  This is a 53 y.o. w/ PMHx of HTN, Hypothryoidism, Diabetes, Anxiety, Depression, RLS presents to sleep clinic for first visit after starting new CPAP therapy (previously on PAP therapy years past) recent HST showed severe Obstructive Sleep Apnea (Ahi 93.7/hr and sleep related hypoxia (Percent oximetry less than 89% was 128.4 minutes, longest consecutive time under 88% was 10.5 minutes, however average% oximetry was 90%)). . Patient is using positive airway pressure therapy and the symptoms of sleep apnea have improved significantly on the therapy. Normally patient goes to bed at 10 PM and wakes up at 6 AM .  The patient wakes up 2-3 time(s) during the night and has no problem going back to sleep.  Feels refreshed after waking up.     Overall patient's Impression of their PAP therapy is:  Going good  Barriers to therapy: Mask was hurting bridge of her nose when she first started wearing it   She started using as a bandaid as a barrier device to bridge of those nose which made therapy signficantly more comfortable for her   Feels sleep restorative with PAP therapy  Motivated to continue       Compliance data is reviewed by me with patient in room today  Date range 9/26/2023 to 10/26/2023  Overall days used 65%  4-hour naveen 65%  Average days used 7 hours 52 minutes  Device AirSense 11 AutoSet  Auto CPAP 8 to 20 cm H2O EPR 2  95th percentile pressure 13.4 cm H2O  95th  "percentile leak is 13.6 LPM  Residual AHI is 2.1  DME: [facility]  Mask: FFM     -Making progress with weight loss   Lifestyle modifications drinking more water and portion control  Wt Readings from Last 3 Encounters:   10/27/23 94.6 kg (208 lb 9.6 oz)   10/06/23 95.3 kg (210 lb)   09/15/23 95.3 kg (210 lb)         REVIEW OF SYSTEMS:   Is negative unless otherwise noted in HPI  [place] Sleepiness Scale :Total score: 9     Disclaimer History: The above history is based on this sleep physician's in room encounter with the patient. Pre encounter self administered questionnaires are taken into consideration and discussed with patient for any discordance. The above documentation by this sleep physician is the most accurate clinical information determined by in room sleep physician encounter with patient.     PHYSICAL EXAMINATION:  Vitals:    10/27/23 1100   BP: 123/77   Pulse: 98   SpO2: 94%   Weight: 94.6 kg (208 lb 9.6 oz)   Height: 152.4 cm (60\")    Body mass index is 40.74 kg/m².   CONSTITUTIONAL: Well appearing, in no overt pain or respiratory distress   NOSE: No septal defect  RESP SYSTEM: Breath sounds are clear bilateral (no Rales/rhonchi/wheezes), no overt respiratory distress, speaks in clear sentences without dyspnea, no accessory muscle use, no dyspnea  CARDIOVASULAR: RRR, no rub, no gallop no edema noted  NEURO: Oriented x 3, no new or worsening gross focal deficits  Psychiatric: Very pleasant, motivated and goal oriented    Compliance data is reviewed by me with patient in room today  Date range 9/26/2023 to 10/26/2023  Overall days used 65%  4-hour naveen 65%  Average days used 7 hours 52 minutes  Device AirSense 11 AutoSet  Auto CPAP 8 to 20 cm H2O EPR 2  95th percentile pressure 13.4 cm H2O  95th percentile leak is 13.6 LPM  Residual AHI is 2.1  DME: [facility]  Mask: FFM     ASSESSMENT AND PLAN:  Severe Obstructive sleep apnea ( G 47.33).    Sleep related hypoxia [G47.34]   -Specific Changes made by me today:  I. " Compliance is sub-optimal close 3 month follow up for same otherwise Therapy is at goal. Counseled compliance requirements. Prior barrier was getting use to uncomfortable mask  she designed a work around by wearing bandaid under portion that was causing her to feel uncomfortable. In the future if problems persists can consider switching masks to different brand.   II. Overnight oximetry to be done with CPAP (verbal speak back teaching from patient to understand to wear CPAP with night of oximetry; I let her know we'll review results in clinic together)  III. Through shared decision making she feels comfortable with current PAP therapy settings I do not see any need to adjust her therapy as clinically correlated in today's sleep medicine visit.   The symptoms of sleep apnea have improved with the device and the treatment.  Patient's compliance with the device is sub-optimal for treatment of sleep apnea.  I have independently reviewed the smart card down load and discussed with the patient the download data and encouarged the patient to continue to use the device.The residual AHI is acceptable. The device is benefiting the patient and the device is medically necessary.  Without proper control of sleep apnea and good compliance there is a increased risk for hypertension, diabetes mellitus and nonrestorative sleep with hypersomnia which can increase risk for motor vehicle accidents.  Untreated sleep apnea is also a risk factor for development of atrial fibrillation, pulmonary hypertension, insulin resistance and stroke. The patient is also instructed to get the supplies from the [employer] and and change them on a regular basis.  A prescription for supplies has been sent to the [facility].  I have also discussed the good sleep hygiene habits and adequate amount of sleep needed for good health.  Obesity   with BMI is Body mass index is 40.74 kg/m².. Making progress with lifestyle modifications drinking more water and  portion control. Counseled weight loss will be beneficial for reduction in severity of sleep apnea, healthy diet/exercise to achieve same, follow up with primary care physician for serial monitoring and to further guide management.  RLS, managed by PCP on gabapentin. Symptoms are well controlled on po iron therapy knows not to take with dairy takes with Vitamin C. Treating sleep disordered breathing is beneficial to same.      Follow up in 3 months . Patient's questions were answered.      Dispo: 3 month for compliance check, Overnight oximetry to be done hopefully before next visit to be done with PAP order for same placed today, RLS well controlled, if mask barrier in the future consider switching       EMR Dragon/Transcription disclaimer:   Much of this encounter note is an electronic transcription/translation of spoken language to printed text. The electronic translation of spoken language may permit erroneous, or at times, nonsensical words or phrases to be inadvertently transcribed; Although I have reviewed the note for such errors, some may still exist.       NPI #: [id number]    Alex Tineo, DO  Sleep Medicine  [facility]  10/27/23

## 2023-11-08 VITALS — BODY MASS INDEX: 40.77 KG/M2 | HEIGHT: 60 IN | WEIGHT: 207.67 LBS

## 2023-11-08 NOTE — PROGRESS NOTES
"  Carlota Robni is a 53 y.o. female who presents to [facility] for nutrition consult r/t diagnosis of T2DM, pt states she was diagnosed about 4 years ago.  Carlota Robin is referred by RAHEEM Siddiqui.    Past Medical History:   Diagnosis Date    Acne     Allergic rhinitis due to allergen 04/15/2021    Allergies     Anemia 12/03/2020    Anesthesia     HARD TO WAKE UP POSTOP    Anxiety 04/15/2021    Asthma     INHALERS PRN    Bundle branch block, right     SEE'S DR BENNETT IN 2019, NOW SEES ON AS NEEDED BASIS, MAINLY FOLLOW PCP ROUTINELY. DENIED CP/SOB    Depression     Diabetes     Diabetes mellitus, type 2 06/08/2020    DOESN'T CHECK BG AT HOME    Essential hypertension 06/08/2020    Fatigue 04/15/2021    Fibromyalgia     GERD (gastroesophageal reflux disease) 12/03/2020    High cholesterol     Hyperlipidemia 12/03/2020    Hypothyroidism 06/16/2020    Inguinal hernia     Insomnia     Macromastia     Migraine     Obesity 02/25/2020    DANY (obstructive sleep apnea) 06/08/2020    Palpitation     NO PROBLEMS CURRENTLY. FOLLOWS PCP    Right bundle branch block 06/16/2020    SEE'S DR BLUE ON AS NEEDED BASIS, SEE'S PCP ON YEARLY. DENIED CP/SOB    RLS (restless legs syndrome)     Seasonal allergies     Sinus trouble     Stomach pain 2016    Stress 2016    Thyroid disease     Urinary incontinence     Vitamin B 12 deficiency     Vitamin D deficiency 06/08/2020       Anthropometrics    152.4 cm (60\")  94.2 kg (207 lb 10.8 oz)  40.56 kg/m²    Diabetes History    Diabetes History  What type of diabetes do you have?: Type 2  Length of Diabetes Diagnosis: 1 - 5 years  Current DM knowledge: good  Have you had diabetes education/teaching in the past?: yes    Education Preferences    Education Preferences  What areas of diabetes would you like to learn about?: diet information    Nutrition Information    Nutrition Information  Enter everything you can remember eating in the last 24 hours (1 " day): breakfast- sausage biscuit; lunch- sandwich/soup/salad; dinner- meatloaf, mashed potatoes, green beans; snacks-PB crackers, banana, 1/2 sandwich; beverages- water  What is the biggest challenge you have with your diet?: Knowledge    Education Needs    DM Education Needs  Medication: Other injectables  Healthy Eating: RD consult, Reviewed meal plan, Basic meal plan provided  Motivation: Engaged  Teaching Method: Explanation, Discussion, Handouts  Patient Response: Verbalized understanding    DM Goals    DM Goals  Healthy Eating - Goal: Today  Being Active - Goal: Today  Taking Medication - Goal: Today  Contact Plan: Follow-up medical care      Medications    Current Outpatient Medications   Medication    amLODIPine    ARIPiprazole    FreeStyle Lite    buPROPion XL    Calcium Carbonate    cetirizine    cimetidine    FreeStyle Harley 3 Sensor    cyanocobalamin    DULoxetine    ezetimibe    FREESTYLE LITE    gabapentin    gabapentin    hydroCHLOROthiazide    Toujeo SoloStar    Toujeo SoloStar    iron polysaccharides    freestyle    levothyroxine    losartan    meloxicam    montelukast    ondansetron    pantoprazole    ProAir RespiClick    promethazine    saccharomyces boulardii    Ozempic (0.25 or 0.5 MG/DOSE)    Ozempic (0.25 or 0.5 MG/DOSE)    solifenacin    topiramate    traZODone    vitamin D    Vitamin E     Labs       Lab Results   Component Value Date    CHOL 266 (H) 05/15/2023    TRIG 552 (H) 05/15/2023    HDL 39 (L) 05/15/2023     (H) 05/15/2023     September 2023 A1c 12.7%, previous A1c from May was 8.4%    Nutrition counseling provided on carbohydrate counting, portion control, measuring and reading labels.  Discussed eating out and gave suggestions on controlling carbohydrate intake and making healthier food choices.     Meal Plan:     Total Carbohydrates per meal: 2-3 carb servings/meal, at least 3 meals/day; smaller more frequent meals discussed if pt notes appetite change with taking  Ozempic  Lean protein with meals.  Limit added fats.  Snacks: 1 carbohydrate serving (</= 22 g) + 1 protein serving.     Daily exercise encouraged (as recommended by healthcare provider). Discussed the benefits of exercise in lowering blood glucose, blood pressure, cholesterol, stress and controlling body weight.     Discussed blood glucose monitoring to assist with understanding of factors affecting blood glucose and assist with management of diabetes.  Discussed and provided with target BG ranges.     Literature provided: Diabetes Nutrition Placemat, Choose Your Foods Booklet    Dietitian contact number provided.  Patient encouraged to call with questions or concerns.     Time spent with patient: 20 minutes    Viktoriya Arellano RDN, LD  09/18/2023

## 2023-11-14 NOTE — PROGRESS NOTES
Chief Complaint  Diabetes    Referred By: DONNELL Burgos    Subjective          Carlota MONTOYA [place] presents to [facility] for diabetes medication management    History of Present Illness    Visit type:  follow-up  Diabetes type:  Type 2  Current diabetes status/concerns/issues:  Last visit Toujeo 10 units was prescribed and she was given an taper to increase by 3 units q 3 days until her fbs is less than 150. She is currently on 48 units once day of Toujeo. Reports her blood sugar has been running less than 150 in the morning. States she is not as fatigued as before and her blurred vision has cleared up.  States she has been having some difficulty with her sensors staying in place.  Other health concerns: none  Current Diabetes symptoms:    Polyuria: No   Polydipsia: No   Polyphagia: Yes     Blurred vision: No   Excessive fatigue: Yes    Known Diabetes complications:  Neuropathy: None; Location: N/A  Renal: None  Eyes: None; Location: N/A; Last Eye Exam:  2022 ; Location: [facility]  Amputation/Wounds: None  GI: Reflux  Cardiovascular: Hypertension and Hyperlipidemia  ED: N/A  Other: None  Hypoglycemia:  None reported at this time and 0% per CGM  Hypoglycemia Symptoms:  No hypoglycemia at this time  Current diabetes treatment:  Ozempic 0.5 mg once weekly, Toujeo 48 units qd  Blood glucose device:  [employer] CGM  Blood glucose monitoring frequency:  Continuous per CGM  Blood glucose range/average:  The 14-day sensor report shows an average glucose of 266 mg/dL, with 5% in target range ( mgdL), 34% in the high range (181-250 mg/dL), 61% in the very high range (>250 mg/dL), 0% in the low range (54-70 mg/dL) and 0% in the very low range (<54 mg/dL).   Glucose Source: Device Reviewed  Dietary behavior:  Avoids sugary drinks, Number of meals each day - 3; Number of snacks each day - 1-2, she eliminated sugar from her diet and decreased her carb intake.   Activity/Exercise:  she  has been able to be more active around the house. She is going to start exercising.     Past Medical History:   Diagnosis Date    Acne     Allergic rhinitis due to allergen 04/15/2021    Allergies     Anemia 2020    Anesthesia     HARD TO WAKE UP POSTOP    Anxiety 04/15/2021    Asthma     INHALERS PRN    Bundle branch block, right     SEE'S DR BENNETT IN 2019, NOW SEES ON AS NEEDED BASIS, MAINLY FOLLOW PCP ROUTINELY. DENIED CP/SOB    Depression     Diabetes     Diabetes mellitus, type 2 2020    DOESN'T CHECK BG AT HOME    Essential hypertension 2020    Fatigue 04/15/2021    Fibromyalgia     GERD (gastroesophageal reflux disease) 2020    High cholesterol     Hyperlipidemia 2020    Hypothyroidism 2020    Inguinal hernia     Insomnia     Macromastia     Migraine     Obesity 2020    DANY (obstructive sleep apnea) 2020    Palpitation     NO PROBLEMS CURRENTLY. FOLLOWS PCP    Right bundle branch block 2020    SEE'S DR BLUE ON AS NEEDED BASIS, SEE'S PCP ON YEARLY. DENIED CP/SOB    RLS (restless legs syndrome)     Seasonal allergies     Sinus trouble     Stomach pain 2016    Stress 2016    Thyroid disease     Urinary incontinence     Vitamin B 12 deficiency     Vitamin D deficiency 2020     Past Surgical History:   Procedure Laterality Date    BILATERAL BREAST REDUCTION Bilateral 2022    Procedure: BREAST REDUCTION;  Surgeon: Zeeshan Bone MD;  Location: [facility] OR [facility];  Service: Plastics;  Laterality: Bilateral;    BREAST BIOPSY Right      SECTION      CHOLECYSTECTOMY      COLONOSCOPY      CYSTOSCOPY BOTOX INJECTION OF BLADDER  2019    ENDOSCOPY      HERNIA REPAIR      HYSTERECTOMY  2017    WISDOM TOOTH EXTRACTION       Family History   Problem Relation Age of Onset    Stroke Mother     Diabetes Mother     Restless legs syndrome Mother     Nephrolithiasis Father     Sleep apnea Father     Restless legs syndrome Sister     Colon  cancer Maternal Grandmother         50S    Colon cancer Paternal Grandfather         70S    Diabetes Maternal Uncle     Breast cancer Other         20S    Diabetes Other      Social History     Socioeconomic History    Marital status:    Tobacco Use    Smoking status: Never     Passive exposure: Never    Smokeless tobacco: Never   Vaping Use    Vaping Use: Never used   Substance and Sexual Activity    Alcohol use: Never    Drug use: Never    Sexual activity: Defer     Allergies   Allergen Reactions    Pravastatin Sodium Nausea Only    Hydrocodone-Acetaminophen Hallucinations     OK TO TAKE REGULAR TYLENOL     Lisinopril Cough    Pollen Extract Other (See Comments)     CONGESTION, WATERY EYES, SNEEZING        Current Outpatient Medications:     amLODIPine (NORVASC) 10 MG tablet, Take 1 tablet by mouth Daily., Disp: 30 tablet, Rfl: 5    ARIPiprazole (ABILIFY) 5 MG tablet, Take 1.5 tablets by mouth Daily., Disp: 45 tablet, Rfl: 5    Blood Glucose Monitoring Suppl (FreeStyle Lite) w/Device kit, , Disp: , Rfl:     buPROPion XL (WELLBUTRIN XL) 150 MG 24 hr tablet, Take 1 tablet by mouth Daily., Disp: , Rfl:     Calcium Carbonate 1500 (600 Ca) MG tablet, TAKE ONE TABLET BY MOUTH ONCE DAILY, Disp: 30 tablet, Rfl: 4    cetirizine (zyrTEC) 10 MG tablet, Take 1 tablet by mouth Daily., Disp: , Rfl:     cimetidine (TAGAMET) 200 MG tablet, Take 1 tablet by mouth 2 (Two) Times a Day Before Meals., Disp: 60 tablet, Rfl: 5    Continuous Blood Gluc Sensor (FreeStyle Harley 3 Sensor) misc, Use 1 each Every 14 (Fourteen) Days., Disp: 2 each, Rfl: 5    cyanocobalamin (VITAMIN B-12) 1000 MCG tablet, Vitamin B-12 1,000 mcg oral tablet take 1 tablet by oral route daily   Active, Disp: , Rfl:     DULoxetine (CYMBALTA) 60 MG capsule, Take 1 capsule by mouth Daily., Disp: 30 capsule, Rfl: 5    ezetimibe (Zetia) 10 MG tablet, Take 1 tablet by mouth Daily., Disp: 30 tablet, Rfl: 5    FREESTYLE LITE test strip, , Disp: , Rfl:      gabapentin (NEURONTIN) 100 MG capsule, Take 1 capsule twice a day by oral route for 30 days., Disp: , Rfl:     gabapentin (NEURONTIN) 300 MG capsule, Take 1 capsule every day by oral route at bedtime for 30 days., Disp: , Rfl:     hydroCHLOROthiazide (HYDRODIURIL) 12.5 MG tablet, Take 1 tablet by mouth Daily., Disp: 30 tablet, Rfl: 5    Insulin Glargine, 1 Unit Dial, (Toujeo SoloStar) 300 UNIT/ML solution pen-injector injection, Inject 48 Units under the skin into the appropriate area as directed Daily for 90 days., Disp: 4.8 mL, Rfl: 2    iron polysaccharides (Ferrex 150) 150 MG capsule, Take 1 capsule by mouth Daily., Disp: 90 capsule, Rfl: 1    Lancets (freestyle) lancets, , Disp: , Rfl:     levothyroxine (SYNTHROID, LEVOTHROID) 25 MCG tablet, Take 1 tablet by mouth Daily., Disp: 30 tablet, Rfl: 5    losartan (COZAAR) 25 MG tablet, Take 1 tablet by mouth 2 (two) times a day., Disp: 60 tablet, Rfl: 3    meloxicam (MOBIC) 7.5 MG tablet, Take 1 tablet by mouth Daily., Disp: 30 tablet, Rfl: 5    montelukast (SINGULAIR) 10 MG tablet, Take 1 tablet by mouth Every Evening., Disp: 30 tablet, Rfl: 5    ondansetron (Zofran) 4 MG tablet, Take 1 tablet by mouth Every 8 (Eight) Hours As Needed for Nausea or Vomiting., Disp: 30 tablet, Rfl: 0    pantoprazole (PROTONIX) 40 MG EC tablet, Take 1 tablet by mouth Daily., Disp: 30 tablet, Rfl: 5    ProAir RespiClick 108 (90 Base) MCG/ACT inhaler, Inhale 1 puff every 4 (four) hours as needed for wheezing., Disp: 1 g, Rfl: 1    promethazine (PHENERGAN) 12.5 MG tablet, Take 1 tablet by mouth Every 6 (Six) Hours As Needed for Nausea or Vomiting., Disp: 20 tablet, Rfl: 0    saccharomyces boulardii (FLORASTOR) 250 MG capsule, Take 1 capsule by mouth 2 (Two) Times a Day., Disp: , Rfl:     solifenacin (VESICARE) 5 MG tablet, Vesicare 5 mg oral tablet take 1 tablet (5 mg) by oral route once daily   Suspended, Disp: , Rfl:     topiramate (TOPAMAX) 50 MG tablet, , Disp: , Rfl:     traZODone  "(DESYREL) 50 MG tablet, Take 1 tablet by mouth Every Night., Disp: 30 tablet, Rfl: 5    vitamin D (ERGOCALCIFEROL) 1.25 MG (42757 UT) capsule capsule, Take 1 capsule by mouth 1 (One) Time Per Week., Disp: 5 capsule, Rfl: 5    Vitamin E 90 MG (200 UNIT) capsule, Take 1 capsule by mouth Daily., Disp: 30 capsule, Rfl: 5    Insulin Pen Needle (Pen Needles) 32G X 4 MM misc, Use 1 each Daily for 30 days., Disp: 30 each, Rfl: 5    Semaglutide, 1 MG/DOSE, (Ozempic, 1 MG/DOSE,) 4 MG/3ML solution pen-injector, Inject 1 mg under the skin into the appropriate area as directed Every 7 (Seven) Days for 12 doses., Disp: 3 mL, Rfl: 2    Objective     Vitals:    11/16/23 1019   BP: 120/80   BP Location: Right arm   Patient Position: Sitting   Pulse: 80   SpO2: 98%   Weight: 95.3 kg (210 lb)   Height: 152.4 cm (60\")     Body mass index is 41.01 kg/m².    Physical Exam  Constitutional:       Appearance: Normal appearance. She is normal weight. She is obese.      Comments: Severe Obesity (BMI >= 40) Pt Current BMI = 41.01      HENT:      Head: Normocephalic and atraumatic.      Right Ear: External ear normal.      Left Ear: External ear normal.      Nose: Nose normal.   Eyes:      Extraocular Movements: Extraocular movements intact.      Conjunctiva/sclera: Conjunctivae normal.   Pulmonary:      Effort: Pulmonary effort is normal.   Musculoskeletal:         General: Normal range of motion.      Cervical back: Normal range of motion.   Skin:     General: Skin is warm and dry.   Neurological:      General: No focal deficit present.      Mental Status: She is alert and oriented to person, place, and time. Mental status is at baseline.   Psychiatric:         Mood and Affect: Mood normal.         Behavior: Behavior normal.         Thought Content: Thought content normal.         Judgment: Judgment normal.             Result Review :   The following data was reviewed by: RAHEEM Garcia on 11/16/2023:    Most Recent A1C          " 11/15/2023    09:28   HGBA1C Most Recent   Hemoglobin A1C 11.20        A1C Last 3 Results          9/12/2023    11:36 10/6/2023    16:23 11/15/2023    09:28   HGBA1C Last 3 Results   Hemoglobin A1C 12.7  12.5  11.20      A1c collected on 11/15/2023  is 11.2%, indicating Uncontrolled Type II diabetes.  This result is down from the prior result of 12.5% collected on 10/6/2023        Creatinine   Date Value Ref Range Status   11/15/2023 0.62 0.57 - 1.00 mg/dL Final   05/15/2023 0.60 0.57 - 1.00 mg/dL Final     eGFR   Date Value Ref Range Status   11/15/2023 106.0 >60.0 mL/min/1.73 Final   05/15/2023 107.5 >60.0 mL/min/1.73 Final     Labs collected on 11/15/2023 show Normal values    Microalbumin, Urine   Date Value Ref Range Status   11/15/2023 <1.2 mg/dL Final   05/15/2023 2.1 mg/dL Final     Creatinine, Urine   Date Value Ref Range Status   03/29/2022 99.0 mg/dL Final     Microalbumin/Creatinine Ratio   Date Value Ref Range Status   03/29/2022 44.4 mg/g Final   12/14/2020 15.2 0.0 - 35.0 mg/g[Cre] Final     Urine microalbuminuria collected on 11/15/2023 is negative for microalbuminuria    Total Cholesterol   Date Value Ref Range Status   11/15/2023 251 (H) 0 - 200 mg/dL Final   05/15/2023 266 (H) 0 - 200 mg/dL Final     Triglycerides   Date Value Ref Range Status   11/15/2023 530 (H) 0 - 150 mg/dL Final   05/15/2023 552 (H) 0 - 150 mg/dL Final     HDL Cholesterol   Date Value Ref Range Status   11/15/2023 34 (L) 40 - 60 mg/dL Final   05/15/2023 39 (L) 40 - 60 mg/dL Final     LDL Cholesterol    Date Value Ref Range Status   11/15/2023 122 (H) 0 - 100 mg/dL Final   05/15/2023 127 (H) 0 - 100 mg/dL Final     Lipid panel collected on 11/15/2023 shows Hyperlipidemia, Hypercholesterolemia, Hypertriglyceridemia, and low HDL            Assessment: Patient is here today for 1 month follow-up on her diabetes.  Last visit Touarchie was prescribed and she was given instructions to taper up by 3 units every 3 days until her fasting  blood sugar was less than 150 then she was to remain on that dose.  She reports she is currently up to 48 units once daily.  She reports less fatigue and her blurred vision has cleared since starting the insulin.  Reviewed CGM report with patient in the office today.  The 14-day sensor report shows an average glucose of 266 mg/dL, with 5% in target range ( mgdL), 34% in the high range (181-250 mg/dL), 61% in the very high range (>250 mg/dL), 0% in the low range (54-70 mg/dL) and 0% in the very low range (<54 mg/dL).  Her A1c on 11/15/2023 was 11.2% which is down from her previous A1c of 12.5% in October.  She reports she is watching her carbohydrate and sugar intake and she plans on starting to exercise.      Diagnoses and all orders for this visit:    1. Uncontrolled type 2 diabetes mellitus with hyperglycemia (Primary)  -     Insulin Glargine, 1 Unit Dial, (Toujeo SoloStar) 300 UNIT/ML solution pen-injector injection; Inject 48 Units under the skin into the appropriate area as directed Daily for 90 days.  Dispense: 4.8 mL; Refill: 2  -     Insulin Pen Needle (Pen Needles) 32G X 4 MM misc; Use 1 each Daily for 30 days.  Dispense: 30 each; Refill: 5    2. Type 2 diabetes mellitus with hyperglycemia, unspecified whether long term insulin use  -     Insulin Glargine, 1 Unit Dial, (Toujeo SoloStar) 300 UNIT/ML solution pen-injector injection; Inject 48 Units under the skin into the appropriate area as directed Daily for 90 days.  Dispense: 4.8 mL; Refill: 2  -     Insulin Pen Needle (Pen Needles) 32G X 4 MM misc; Use 1 each Daily for 30 days.  Dispense: 30 each; Refill: 5    3. Severe obesity (BMI >= 40)    4. Uses self-applied continuous glucose monitoring device    Other orders  -     Semaglutide, 1 MG/DOSE, (Ozempic, 1 MG/DOSE,) 4 MG/3ML solution pen-injector; Inject 1 mg under the skin into the appropriate area as directed Every 7 (Seven) Days for 12 doses.  Dispense: 3 mL; Refill: 2        Plan: Increased  Toujeo to 51 units once daily.  Instructed patient to continue tapering her insulin by 3 units every 3 days until her fasting blood sugars less than 150 then she is to remain on that dose.  Increased her dose of Ozempic from 0.5 mg once weekly to 1 mg once weekly.  Instructed patient she can finish her current prescription of 0.5 mg by taking 2 injections to equal 1 mg until she gets her new prescription.  Encouraged patient to continue working on diet and exercise.  Scheduled a 1 month follow-up and we will review her CGM at that time.    The patient will monitor her blood glucose levels per CGM.  If she develops problematic hyperglycemia or hypoglycemia or adverse drug reactions, she will contact the office for further instructions.        Follow Up     Return in about 4 weeks (around 12/14/2023) for CGM Follow Up.    Patient was given instructions and counseling regarding her condition or for health maintenance advice. Please see specific information pulled into the AVS if appropriate.     Marilee Laughlin, APRN  11/16/2023      Dictated Utilizing Dragon Dictation.  Please note that portions of this note were completed with a voice recognition program.  Part of this note may be an electronic transcription/translation of spoken language to printed text using the Dragon Dictation System.

## 2023-11-15 ENCOUNTER — OFFICE VISIT (OUTPATIENT)
Dept: FAMILY MEDICINE CLINIC | Facility: CLINIC | Age: 53
End: 2023-11-15
Payer: COMMERCIAL

## 2023-11-15 VITALS
WEIGHT: 208.3 LBS | SYSTOLIC BLOOD PRESSURE: 120 MMHG | HEIGHT: 60 IN | BODY MASS INDEX: 40.89 KG/M2 | HEART RATE: 99 BPM | TEMPERATURE: 98.6 F | OXYGEN SATURATION: 95 % | RESPIRATION RATE: 18 BRPM | DIASTOLIC BLOOD PRESSURE: 80 MMHG

## 2023-11-15 DIAGNOSIS — F41.9 ANXIETY: ICD-10-CM

## 2023-11-15 DIAGNOSIS — D50.9 IRON DEFICIENCY ANEMIA, UNSPECIFIED IRON DEFICIENCY ANEMIA TYPE: ICD-10-CM

## 2023-11-15 DIAGNOSIS — E11.65 TYPE 2 DIABETES MELLITUS WITH HYPERGLYCEMIA, WITH LONG-TERM CURRENT USE OF INSULIN: Primary | ICD-10-CM

## 2023-11-15 DIAGNOSIS — I10 PRIMARY HYPERTENSION: ICD-10-CM

## 2023-11-15 DIAGNOSIS — E78.5 HYPERLIPIDEMIA, UNSPECIFIED HYPERLIPIDEMIA TYPE: ICD-10-CM

## 2023-11-15 DIAGNOSIS — M79.7 FIBROMYALGIA: ICD-10-CM

## 2023-11-15 DIAGNOSIS — J30.2 SEASONAL ALLERGIC RHINITIS, UNSPECIFIED TRIGGER: ICD-10-CM

## 2023-11-15 DIAGNOSIS — Z79.4 TYPE 2 DIABETES MELLITUS WITH HYPERGLYCEMIA, WITH LONG-TERM CURRENT USE OF INSULIN: Primary | ICD-10-CM

## 2023-11-15 DIAGNOSIS — E03.9 ACQUIRED HYPOTHYROIDISM: ICD-10-CM

## 2023-11-15 DIAGNOSIS — Z12.31 SCREENING MAMMOGRAM FOR BREAST CANCER: ICD-10-CM

## 2023-11-15 DIAGNOSIS — K21.9 GASTROESOPHAGEAL REFLUX DISEASE WITHOUT ESOPHAGITIS: ICD-10-CM

## 2023-11-15 DIAGNOSIS — E55.9 VITAMIN D DEFICIENCY: ICD-10-CM

## 2023-11-15 DIAGNOSIS — G47.09 OTHER INSOMNIA: ICD-10-CM

## 2023-11-15 LAB
25(OH)D3 SERPL-MCNC: 40.8 NG/ML (ref 30–100)
ALBUMIN SERPL-MCNC: 4.3 G/DL (ref 3.5–5.2)
ALBUMIN UR-MCNC: <1.2 MG/DL
ALBUMIN/GLOB SERPL: 1.6 G/DL
ALP SERPL-CCNC: 140 U/L (ref 39–117)
ALT SERPL W P-5'-P-CCNC: 40 U/L (ref 1–33)
ANION GAP SERPL CALCULATED.3IONS-SCNC: 11 MMOL/L (ref 5–15)
AST SERPL-CCNC: 37 U/L (ref 1–32)
BASOPHILS # BLD AUTO: 0.05 10*3/MM3 (ref 0–0.2)
BASOPHILS NFR BLD AUTO: 0.7 % (ref 0–1.5)
BILIRUB SERPL-MCNC: 0.2 MG/DL (ref 0–1.2)
BUN SERPL-MCNC: 11 MG/DL (ref 6–20)
BUN/CREAT SERPL: 17.7 (ref 7–25)
CALCIUM SPEC-SCNC: 9.3 MG/DL (ref 8.6–10.5)
CHLORIDE SERPL-SCNC: 100 MMOL/L (ref 98–107)
CHOLEST SERPL-MCNC: 251 MG/DL (ref 0–200)
CO2 SERPL-SCNC: 27 MMOL/L (ref 22–29)
CREAT SERPL-MCNC: 0.62 MG/DL (ref 0.57–1)
DEPRECATED RDW RBC AUTO: 42 FL (ref 37–54)
EGFRCR SERPLBLD CKD-EPI 2021: 106 ML/MIN/1.73
EOSINOPHIL # BLD AUTO: 0.26 10*3/MM3 (ref 0–0.4)
EOSINOPHIL NFR BLD AUTO: 3.9 % (ref 0.3–6.2)
ERYTHROCYTE [DISTWIDTH] IN BLOOD BY AUTOMATED COUNT: 13.5 % (ref 12.3–15.4)
GLOBULIN UR ELPH-MCNC: 2.7 GM/DL
GLUCOSE SERPL-MCNC: 296 MG/DL (ref 65–99)
HBA1C MFR BLD: 11.2 % (ref 4.8–5.6)
HCT VFR BLD AUTO: 44.6 % (ref 34–46.6)
HDLC SERPL-MCNC: 34 MG/DL (ref 40–60)
HGB BLD-MCNC: 14.7 G/DL (ref 12–15.9)
IMM GRANULOCYTES # BLD AUTO: 0.03 10*3/MM3 (ref 0–0.05)
IMM GRANULOCYTES NFR BLD AUTO: 0.4 % (ref 0–0.5)
LDLC SERPL CALC-MCNC: 122 MG/DL (ref 0–100)
LDLC/HDLC SERPL: 3.26 {RATIO}
LYMPHOCYTES # BLD AUTO: 1.86 10*3/MM3 (ref 0.7–3.1)
LYMPHOCYTES NFR BLD AUTO: 27.7 % (ref 19.6–45.3)
MCH RBC QN AUTO: 28.3 PG (ref 26.6–33)
MCHC RBC AUTO-ENTMCNC: 33 G/DL (ref 31.5–35.7)
MCV RBC AUTO: 85.8 FL (ref 79–97)
MONOCYTES # BLD AUTO: 0.6 10*3/MM3 (ref 0.1–0.9)
MONOCYTES NFR BLD AUTO: 8.9 % (ref 5–12)
NEUTROPHILS NFR BLD AUTO: 3.91 10*3/MM3 (ref 1.7–7)
NEUTROPHILS NFR BLD AUTO: 58.4 % (ref 42.7–76)
NRBC BLD AUTO-RTO: 0 /100 WBC (ref 0–0.2)
PLATELET # BLD AUTO: 270 10*3/MM3 (ref 140–450)
PMV BLD AUTO: 10.7 FL (ref 6–12)
POTASSIUM SERPL-SCNC: 4.6 MMOL/L (ref 3.5–5.2)
PROT SERPL-MCNC: 7 G/DL (ref 6–8.5)
RBC # BLD AUTO: 5.2 10*6/MM3 (ref 3.77–5.28)
SODIUM SERPL-SCNC: 138 MMOL/L (ref 136–145)
T4 FREE SERPL-MCNC: 1.04 NG/DL (ref 0.93–1.7)
TRIGL SERPL-MCNC: 530 MG/DL (ref 0–150)
TSH SERPL DL<=0.05 MIU/L-ACNC: 2.3 UIU/ML (ref 0.27–4.2)
VLDLC SERPL-MCNC: 95 MG/DL (ref 5–40)
WBC NRBC COR # BLD: 6.71 10*3/MM3 (ref 3.4–10.8)

## 2023-11-15 PROCEDURE — 84443 ASSAY THYROID STIM HORMONE: CPT | Performed by: NURSE PRACTITIONER

## 2023-11-15 PROCEDURE — 82306 VITAMIN D 25 HYDROXY: CPT | Performed by: NURSE PRACTITIONER

## 2023-11-15 PROCEDURE — 80061 LIPID PANEL: CPT | Performed by: NURSE PRACTITIONER

## 2023-11-15 PROCEDURE — 80053 COMPREHEN METABOLIC PANEL: CPT | Performed by: NURSE PRACTITIONER

## 2023-11-15 PROCEDURE — 82043 UR ALBUMIN QUANTITATIVE: CPT | Performed by: NURSE PRACTITIONER

## 2023-11-15 PROCEDURE — 85025 COMPLETE CBC W/AUTO DIFF WBC: CPT | Performed by: NURSE PRACTITIONER

## 2023-11-15 PROCEDURE — 83036 HEMOGLOBIN GLYCOSYLATED A1C: CPT | Performed by: NURSE PRACTITIONER

## 2023-11-15 PROCEDURE — 84439 ASSAY OF FREE THYROXINE: CPT | Performed by: NURSE PRACTITIONER

## 2023-11-15 RX ORDER — MONTELUKAST SODIUM 10 MG/1
10 TABLET ORAL EVERY EVENING
Qty: 30 TABLET | Refills: 5 | Status: SHIPPED | OUTPATIENT
Start: 2023-11-15

## 2023-11-15 RX ORDER — TRAZODONE HYDROCHLORIDE 50 MG/1
50 TABLET ORAL NIGHTLY
Qty: 30 TABLET | Refills: 5 | Status: SHIPPED | OUTPATIENT
Start: 2023-11-15

## 2023-11-15 RX ORDER — EZETIMIBE 10 MG/1
10 TABLET ORAL DAILY
Qty: 30 TABLET | Refills: 5 | Status: SHIPPED | OUTPATIENT
Start: 2023-11-15

## 2023-11-15 RX ORDER — LEVOTHYROXINE SODIUM 0.03 MG/1
25 TABLET ORAL DAILY
Qty: 30 TABLET | Refills: 5 | Status: SHIPPED | OUTPATIENT
Start: 2023-11-15

## 2023-11-15 RX ORDER — PANTOPRAZOLE SODIUM 40 MG/1
40 TABLET, DELAYED RELEASE ORAL DAILY
Qty: 30 TABLET | Refills: 5 | Status: SHIPPED | OUTPATIENT
Start: 2023-11-15

## 2023-11-15 RX ORDER — DULOXETIN HYDROCHLORIDE 60 MG/1
60 CAPSULE, DELAYED RELEASE ORAL DAILY
Qty: 30 CAPSULE | Refills: 5 | Status: SHIPPED | OUTPATIENT
Start: 2023-11-15

## 2023-11-15 RX ORDER — AMLODIPINE BESYLATE 10 MG/1
10 TABLET ORAL DAILY
Qty: 30 TABLET | Refills: 5 | Status: SHIPPED | OUTPATIENT
Start: 2023-11-15

## 2023-11-15 RX ORDER — IRON POLYSACCHARIDE COMPLEX 150 MG
150 CAPSULE ORAL DAILY
Qty: 90 CAPSULE | Refills: 1 | Status: SHIPPED | OUTPATIENT
Start: 2023-11-15

## 2023-11-15 RX ORDER — ARIPIPRAZOLE 5 MG/1
7.5 TABLET ORAL DAILY
Qty: 45 TABLET | Refills: 5 | Status: SHIPPED | OUTPATIENT
Start: 2023-11-15

## 2023-11-15 RX ORDER — ERGOCALCIFEROL 1.25 MG/1
50000 CAPSULE ORAL WEEKLY
Qty: 5 CAPSULE | Refills: 5 | Status: SHIPPED | OUTPATIENT
Start: 2023-11-15

## 2023-11-15 RX ORDER — HYDROCHLOROTHIAZIDE 12.5 MG/1
12.5 TABLET ORAL DAILY
Qty: 30 TABLET | Refills: 5 | Status: SHIPPED | OUTPATIENT
Start: 2023-11-15

## 2023-11-15 RX ORDER — MELOXICAM 7.5 MG/1
7.5 TABLET ORAL DAILY
Qty: 30 TABLET | Refills: 5 | Status: SHIPPED | OUTPATIENT
Start: 2023-11-15

## 2023-11-15 NOTE — PROGRESS NOTES
Chief Complaint  Hypertension, Depression, Hypothyroidism, and Diabetes    Subjective          Carlota Robin presents to [facility]  History of Present Illness  Hypertension: She is well controlled with her norvasc and hydrochlorothiazide.  She also takes her amlodipine daily.  No chest pain or shortness of breath noted.  Her  did just get over COVID and he is doing better but she did not get it.  Vitamin D deficiency and vitamin B12 deficiency: She is taking her vitamin D on a weekly basis.    Allergies: She is taking her Singulair daily.  She also needs a albuterol inhaler just to have on hand if she needs it for her wheezing/cough at times.  She is aware to rinse her mouth out after each use when needed.  Diabetes: She is due an A1c today.  She also is due her microalbumin.  She is taking her metformin once a day.  She is now on insulin due to her sugars.  She is hoping they are down.  Fibro-: She is taking her Mobic and her Cymbalta on a daily basis.  It is not well controlled currently.  She is having increased pain and shaking of the legs even when she drives.  She is worried that it could be the metformin causing the issues.    Thyroid: She is taking her levothyroxine daily and is due lab work today.  Anemia: She takes her iron on a daily basis and is needing iron profile today along with ferritin to see how her levels are.  Anxiety: She is taking the Cymbalta also for the anxiety and the Abilify is doing well but she is wondering if the abilify could go up a little.  No suicidal thoughts or hallucinations.  GERD: She is taking her Tagamet every day morning and night.  No nausea or vomiting.    Depression: Not at risk (5/15/2023)    PHQ-2     PHQ-2 Score: 0    and 5/15/2023               Allergies  Pravastatin sodium, Hydrocodone-acetaminophen, Lisinopril, and Pollen extract    Social History     Tobacco Use    Smoking status: Never     Passive exposure: Never     "Smokeless tobacco: Never   Vaping Use    Vaping Use: Never used   Substance Use Topics    Alcohol use: Never    Drug use: Never       Family History   Problem Relation Age of Onset    Stroke Mother     Diabetes Mother     Restless legs syndrome Mother     Nephrolithiasis Father     Sleep apnea Father     Restless legs syndrome Sister     Colon cancer Maternal Grandmother         50S    Colon cancer Paternal Grandfather         70S    Diabetes Maternal Uncle     Breast cancer Other         20S    Diabetes Other         Health Maintenance Due   Topic Date Due    ZOSTER VACCINE (1 of 2) Never done    ANNUAL PHYSICAL  Never done        Immunization History   Administered Date(s) Administered    Influenza, Unspecified 11/25/2019    Tdap 11/25/2019       Review of Systems   Constitutional:  Negative for fatigue.   Respiratory:  Negative for cough and shortness of breath.    Cardiovascular:  Negative for chest pain.   Gastrointestinal:  Negative for diarrhea, nausea and vomiting.        Objective       Vitals:    11/15/23 0856   BP: 120/80   Pulse: 99   Resp: 18   Temp: 98.6 °F (37 °C)   SpO2: 95%   Weight: 94.5 kg (208 lb 4.8 oz)   Height: 152.4 cm (60\")       Body mass index is 40.68 kg/m².         Physical Exam  Vitals reviewed.   Constitutional:       Appearance: Normal appearance. She is well-developed.   Cardiovascular:      Rate and Rhythm: Normal rate and regular rhythm.      Pulses:           Dorsalis pedis pulses are 2+ on the right side and 2+ on the left side.      Heart sounds: Normal heart sounds. No murmur heard.  Pulmonary:      Effort: Pulmonary effort is normal.      Breath sounds: Normal breath sounds.   Feet:      Right foot:      Protective Sensation: 3 sites tested.  3 sites sensed.      Skin integrity: Skin integrity normal. No ulcer or blister.      Toenail Condition: Right toenails are normal.      Left foot:      Protective Sensation: 3 sites tested.  3 sites sensed.      Skin integrity: Skin " integrity normal. No ulcer or blister.      Toenail Condition: Left toenails are normal.      Comments: Diabetic Foot Exam Performed and Monofilament Test Performed     Neurological:      Mental Status: She is alert and oriented to person, place, and time.      Cranial Nerves: No cranial nerve deficit.      Motor: No weakness.   Psychiatric:         Mood and Affect: Mood and affect normal.             Result Review :     The following data was reviewed by: RAHEEM Burgos on 11/15/2023:    Common Labs   Common labs          9/12/2023    11:36 10/6/2023    16:23 11/15/2023    09:28   Common Labs   Glucose   296    BUN   11    Creatinine   0.62    Sodium   138    Potassium   4.6    Chloride   100    Calcium   9.3    Albumin   4.3    Total Bilirubin   0.2    Alkaline Phosphatase   140    AST (SGOT)   37    ALT (SGPT)   40    WBC   6.71    Hemoglobin   14.7    Hematocrit   44.6    Platelets   270    Total Cholesterol   251    Triglycerides   530    HDL Cholesterol   34    LDL Cholesterol    122    Hemoglobin A1C 12.7  12.5  11.20    Microalbumin, Urine   <1.2      A1C   Most Recent A1C          11/15/2023    09:28   HGBA1C Most Recent   Hemoglobin A1C 11.20                     Assessment and Plan      Diagnoses and all orders for this visit:    1. Type 2 diabetes mellitus with hyperglycemia, with long-term current use of insulin (Primary)  -     Hemoglobin A1c  -     MicroAlbumin, Urine, Random - Urine, Clean Catch    2. Fibromyalgia  -     Vitamin E 90 MG (200 UNIT) capsule; Take 1 capsule by mouth Daily.  Dispense: 30 capsule; Refill: 5  -     meloxicam (MOBIC) 7.5 MG tablet; Take 1 tablet by mouth Daily.  Dispense: 30 tablet; Refill: 5  -     DULoxetine (CYMBALTA) 60 MG capsule; Take 1 capsule by mouth Daily.  Dispense: 30 capsule; Refill: 5    3. Vitamin D deficiency  -     vitamin D (ERGOCALCIFEROL) 1.25 MG (42527 UT) capsule capsule; Take 1 capsule by mouth 1 (One) Time Per Week.  Dispense: 5 capsule;  Refill: 5  -     Vitamin D,25-Hydroxy    4. Other insomnia  -     traZODone (DESYREL) 50 MG tablet; Take 1 tablet by mouth Every Night.  Dispense: 30 tablet; Refill: 5    5. Gastroesophageal reflux disease without esophagitis  -     pantoprazole (PROTONIX) 40 MG EC tablet; Take 1 tablet by mouth Daily.  Dispense: 30 tablet; Refill: 5  -     cimetidine (TAGAMET) 200 MG tablet; Take 1 tablet by mouth 2 (Two) Times a Day Before Meals.  Dispense: 60 tablet; Refill: 5    6. Seasonal allergic rhinitis, unspecified trigger  -     montelukast (SINGULAIR) 10 MG tablet; Take 1 tablet by mouth Every Evening.  Dispense: 30 tablet; Refill: 5    7. Acquired hypothyroidism  -     levothyroxine (SYNTHROID, LEVOTHROID) 25 MCG tablet; Take 1 tablet by mouth Daily.  Dispense: 30 tablet; Refill: 5    8. Iron deficiency anemia, unspecified iron deficiency anemia type  -     iron polysaccharides (Ferrex 150) 150 MG capsule; Take 1 capsule by mouth Daily.  Dispense: 90 capsule; Refill: 1  -     T4, Free    9. Primary hypertension  -     CBC & Differential  -     Comprehensive Metabolic Panel  -     TSH  -     Lipid Panel  -     hydroCHLOROthiazide (HYDRODIURIL) 12.5 MG tablet; Take 1 tablet by mouth Daily.  Dispense: 30 tablet; Refill: 5  -     amLODIPine (NORVASC) 10 MG tablet; Take 1 tablet by mouth Daily.  Dispense: 30 tablet; Refill: 5    10. Hyperlipidemia, unspecified hyperlipidemia type  -     CBC & Differential  -     Comprehensive Metabolic Panel  -     TSH  -     Lipid Panel  -     ezetimibe (Zetia) 10 MG tablet; Take 1 tablet by mouth Daily.  Dispense: 30 tablet; Refill: 5    11. Anxiety  -     DULoxetine (CYMBALTA) 60 MG capsule; Take 1 capsule by mouth Daily.  Dispense: 30 capsule; Refill: 5  -     ARIPiprazole (ABILIFY) 5 MG tablet; Take 1.5 tablets by mouth Daily.  Dispense: 45 tablet; Refill: 5    12. Screening mammogram for breast cancer  -     Mammo Screening Digital Tomosynthesis Bilateral With CAD;  Future            Follow Up     Return in about 6 months (around 5/15/2024).  If no change with increase of abilify we will poss start vraylar--we may think about genesight. Keep Appointment with DM.  Follow-up in 6 months for labs and appt. Call with any concerns or questions that you may have regarding your medications or history.      Patient was given instructions and counseling regarding her condition or for health maintenance advice. Please see specific information pulled into the AVS if appropriate.     Parts of this note are electronic transcriptions/translations of spoken language to printed text using the Dragon Dictation system.          Audra Tran, APRN  11/15/2023

## 2023-11-16 ENCOUNTER — OFFICE VISIT (OUTPATIENT)
Dept: DIABETES SERVICES | Facility: HOSPITAL | Age: 53
End: 2023-11-16
Payer: COMMERCIAL

## 2023-11-16 VITALS
DIASTOLIC BLOOD PRESSURE: 80 MMHG | SYSTOLIC BLOOD PRESSURE: 120 MMHG | BODY MASS INDEX: 41.23 KG/M2 | WEIGHT: 210 LBS | HEART RATE: 80 BPM | HEIGHT: 60 IN | OXYGEN SATURATION: 98 %

## 2023-11-16 DIAGNOSIS — Z97.8 USES SELF-APPLIED CONTINUOUS GLUCOSE MONITORING DEVICE: ICD-10-CM

## 2023-11-16 DIAGNOSIS — E11.65 TYPE 2 DIABETES MELLITUS WITH HYPERGLYCEMIA, UNSPECIFIED WHETHER LONG TERM INSULIN USE: ICD-10-CM

## 2023-11-16 DIAGNOSIS — E11.65 UNCONTROLLED TYPE 2 DIABETES MELLITUS WITH HYPERGLYCEMIA: Primary | ICD-10-CM

## 2023-11-16 DIAGNOSIS — E66.01 SEVERE OBESITY (BMI >= 40): ICD-10-CM

## 2023-11-16 PROCEDURE — 3046F HEMOGLOBIN A1C LEVEL >9.0%: CPT | Performed by: NURSE PRACTITIONER

## 2023-11-16 PROCEDURE — 3079F DIAST BP 80-89 MM HG: CPT | Performed by: NURSE PRACTITIONER

## 2023-11-16 PROCEDURE — 1159F MED LIST DOCD IN RCRD: CPT | Performed by: NURSE PRACTITIONER

## 2023-11-16 PROCEDURE — 99214 OFFICE O/P EST MOD 30 MIN: CPT | Performed by: NURSE PRACTITIONER

## 2023-11-16 PROCEDURE — 1160F RVW MEDS BY RX/DR IN RCRD: CPT | Performed by: NURSE PRACTITIONER

## 2023-11-16 PROCEDURE — 3074F SYST BP LT 130 MM HG: CPT | Performed by: NURSE PRACTITIONER

## 2023-11-16 PROCEDURE — G0463 HOSPITAL OUTPT CLINIC VISIT: HCPCS | Performed by: NURSE PRACTITIONER

## 2023-11-16 PROCEDURE — 95251 CONT GLUC MNTR ANALYSIS I&R: CPT | Performed by: NURSE PRACTITIONER

## 2023-11-16 RX ORDER — PEN NEEDLE, DIABETIC 30 GX3/16"
1 NEEDLE, DISPOSABLE MISCELLANEOUS DAILY
Qty: 30 EACH | Refills: 5 | Status: SHIPPED | OUTPATIENT
Start: 2023-11-16 | End: 2023-12-16

## 2023-11-16 RX ORDER — SEMAGLUTIDE 1.34 MG/ML
1 INJECTION, SOLUTION SUBCUTANEOUS
Qty: 3 ML | Refills: 2 | Status: SHIPPED | OUTPATIENT
Start: 2023-11-16 | End: 2024-02-02

## 2023-11-16 RX ORDER — INSULIN GLARGINE 300 U/ML
48 INJECTION, SOLUTION SUBCUTANEOUS DAILY
Qty: 4.8 ML | Refills: 2 | Status: SHIPPED | OUTPATIENT
Start: 2023-11-16 | End: 2024-02-14

## 2023-11-16 NOTE — PATIENT INSTRUCTIONS
Increase your Toujeo to 51 units once daily.  I want you to continue tapering up by 3 units every 3 days until your fasting blood sugars less than 150 then remain on that dose.  For example tomorrow you will start the 51 units if in 3 days your fasting blood sugar remains above 150 then increase to 54 units continue tapering up by 3 units every 3 days until your fasting blood sugars less than 150 then remain on that dose.    Increase Ozempic to 1 mg once weekly.  You can finish out your current dose of 0.5 mg by taking 2 injections to equal 1 mg until you  your new prescription.  Make sure to do this injection on the same day of the week.   Simple: Patient demonstrates quick and easy understanding

## 2023-11-22 ENCOUNTER — TELEPHONE (OUTPATIENT)
Dept: DIABETES SERVICES | Facility: HOSPITAL | Age: 53
End: 2023-11-22
Payer: COMMERCIAL

## 2023-11-22 DIAGNOSIS — E11.65 UNCONTROLLED TYPE 2 DIABETES MELLITUS WITH HYPERGLYCEMIA: ICD-10-CM

## 2023-11-22 DIAGNOSIS — E11.65 TYPE 2 DIABETES MELLITUS WITH HYPERGLYCEMIA, UNSPECIFIED WHETHER LONG TERM INSULIN USE: ICD-10-CM

## 2023-11-22 RX ORDER — INSULIN GLARGINE 300 U/ML
54 INJECTION, SOLUTION SUBCUTANEOUS DAILY
Qty: 6 ML | Refills: 3 | Status: SHIPPED | OUTPATIENT
Start: 2023-11-22 | End: 2024-04-03

## 2023-11-22 NOTE — TELEPHONE ENCOUNTER
Pharmacy called in and wanted clarification on Toujeo max daily dose, called and spoke to pharmacy and let them know max daily dose should be 60 units.

## 2023-12-06 DIAGNOSIS — K21.9 GASTROESOPHAGEAL REFLUX DISEASE WITHOUT ESOPHAGITIS: ICD-10-CM

## 2023-12-06 RX ORDER — PANTOPRAZOLE SODIUM 40 MG/1
40 TABLET, DELAYED RELEASE ORAL DAILY
Qty: 30 TABLET | Refills: 5 | OUTPATIENT
Start: 2023-12-06

## 2023-12-19 ENCOUNTER — OFFICE VISIT (OUTPATIENT)
Dept: DIABETES SERVICES | Facility: HOSPITAL | Age: 53
End: 2023-12-19
Payer: COMMERCIAL

## 2023-12-19 ENCOUNTER — TELEPHONE (OUTPATIENT)
Dept: DIABETES SERVICES | Facility: HOSPITAL | Age: 53
End: 2023-12-19

## 2023-12-19 VITALS
OXYGEN SATURATION: 95 % | SYSTOLIC BLOOD PRESSURE: 120 MMHG | HEART RATE: 102 BPM | WEIGHT: 210.8 LBS | DIASTOLIC BLOOD PRESSURE: 82 MMHG | BODY MASS INDEX: 41.17 KG/M2

## 2023-12-19 DIAGNOSIS — E11.65 TYPE 2 DIABETES MELLITUS WITH HYPERGLYCEMIA, UNSPECIFIED WHETHER LONG TERM INSULIN USE: Primary | ICD-10-CM

## 2023-12-19 DIAGNOSIS — E11.65 UNCONTROLLED TYPE 2 DIABETES MELLITUS WITH HYPERGLYCEMIA: ICD-10-CM

## 2023-12-19 DIAGNOSIS — Z97.8 USES SELF-APPLIED CONTINUOUS GLUCOSE MONITORING DEVICE: ICD-10-CM

## 2023-12-19 DIAGNOSIS — E11.65 TYPE 2 DIABETES MELLITUS WITH HYPERGLYCEMIA, WITHOUT LONG-TERM CURRENT USE OF INSULIN: ICD-10-CM

## 2023-12-19 DIAGNOSIS — E66.01 SEVERE OBESITY (BMI >= 40): ICD-10-CM

## 2023-12-19 LAB — GLUCOSE BLDC GLUCOMTR-MCNC: 216 MG/DL (ref 70–99)

## 2023-12-19 PROCEDURE — 82948 REAGENT STRIP/BLOOD GLUCOSE: CPT | Performed by: NURSE PRACTITIONER

## 2023-12-19 PROCEDURE — G0463 HOSPITAL OUTPT CLINIC VISIT: HCPCS | Performed by: NURSE PRACTITIONER

## 2023-12-19 RX ORDER — PEN NEEDLE, DIABETIC 30 GX3/16"
1 NEEDLE, DISPOSABLE MISCELLANEOUS DAILY
Qty: 100 EACH | Refills: 5 | Status: SHIPPED | OUTPATIENT
Start: 2023-12-19

## 2023-12-19 RX ORDER — BLOOD-GLUCOSE SENSOR
1 EACH MISCELLANEOUS
Qty: 2 EACH | Refills: 5 | Status: SHIPPED | OUTPATIENT
Start: 2023-12-19

## 2023-12-19 RX ORDER — BLOOD-GLUCOSE METER
1 KIT MISCELLANEOUS DAILY
Qty: 90 EACH | Refills: 1 | Status: SHIPPED | OUTPATIENT
Start: 2023-12-19 | End: 2024-06-16

## 2023-12-19 RX ORDER — SEMAGLUTIDE 2.68 MG/ML
2 INJECTION, SOLUTION SUBCUTANEOUS
Qty: 9 ML | Refills: 1 | Status: SHIPPED | OUTPATIENT
Start: 2023-12-19 | End: 2024-03-18

## 2023-12-19 RX ORDER — LANCETS 28 GAUGE
1 EACH MISCELLANEOUS DAILY
Qty: 100 EACH | Refills: 5 | Status: SHIPPED | OUTPATIENT
Start: 2023-12-19 | End: 2024-03-18

## 2023-12-19 RX ORDER — INSULIN GLARGINE 300 U/ML
83 INJECTION, SOLUTION SUBCUTANEOUS DAILY
Qty: 24.9 ML | Refills: 1 | Status: SHIPPED | OUTPATIENT
Start: 2023-12-19 | End: 2024-06-16

## 2023-12-19 NOTE — PROGRESS NOTES
Chief Complaint  Diabetes (Follow up, tenzin )    Referred By: DONNELL Burgos    Subjective          Carlota MONTOYA Lobito presents to [facility] for diabetes medication management    History of Present Illness    Visit type:  follow-up  Diabetes type:  Type 2  Current diabetes status/concerns/issues:  States she is up to 80 units of Toujeo daily. States her blood sugar is 216-300mg/dl. States she is tolerating the increased dose of Ozempic w/o any side effects. Reports the Ozempic curbed her appetitie. States she is no longer having blurred vision. States she is feeling better since her glucose is coming down.   Other health concerns: No new health concerns  Current Diabetes symptoms:    Polyuria: No   Polydipsia: No   Polyphagia: No   Blurred vision: No   Excessive fatigue: Yes    Known Diabetes complications:  Neuropathy: None; Location: N/A  Renal: None  Eyes: None; Location: N/A; Last Eye Exam:  2022 ; Location: [facility]  Amputation/Wounds: None  GI: Reflux  Cardiovascular: Hypertension and Hyperlipidemia  ED: N/A  Other: None  Hypoglycemia:  None reported at this time and 0% per CGM  Hypoglycemia Symptoms:  No hypoglycemia at this time  Current diabetes treatment:  Ozempic 1 mg once weekly, Toujeo 51 units qd   Blood glucose device:  [employer] CGM  Blood glucose monitoring frequency:  Continuous per CGM  Blood glucose range/average:  The 14-day sensor report shows an average glucose of 259 mg/dL, with 1% in target range ( mgdL), 42% in the high range (181-250 mg/dL), 57% in the very high range (>250 mg/dL), 0% in the low range (54-70 mg/dL) and 0% in the very low range (<54 mg/dL).   Glucose Source: Device Reviewed  Dietary behavior:  Avoids sugary drinks, Number of meals each day - 3; Number of snacks each day - 1-2, she eliminated sugar from her diet and decreased her carb intake.   Activity/Exercise:  she has been able to be more active around the house. She is going  to start exercising.        Past Medical History:   Diagnosis Date    Acne     Allergic rhinitis due to allergen 04/15/2021    Allergies     Anemia 2020    Anesthesia     HARD TO WAKE UP POSTOP    Anxiety 04/15/2021    Asthma     INHALERS PRN    Bundle branch block, right     SEE'S DR BENNETT IN 2019, NOW SEES ON AS NEEDED BASIS, MAINLY FOLLOW PCP ROUTINELY. DENIED CP/SOB    Depression     Diabetes     Diabetes mellitus, type 2 2020    DOESN'T CHECK BG AT HOME    Essential hypertension 2020    Fatigue 04/15/2021    Fibromyalgia     GERD (gastroesophageal reflux disease) 2020    High cholesterol     Hyperlipidemia 2020    Hypothyroidism 2020    Inguinal hernia     Insomnia     Macromastia     Migraine     Obesity 2020    DANY (obstructive sleep apnea) 2020    Palpitation     NO PROBLEMS CURRENTLY. FOLLOWS PCP    Right bundle branch block 2020    SEE'S DR BLUE ON AS NEEDED BASIS, SEE'S PCP ON YEARLY. DENIED CP/SOB    RLS (restless legs syndrome)     Seasonal allergies     Sinus trouble     Stomach pain 2016    Stress 2016    Thyroid disease     Urinary incontinence     Vitamin B 12 deficiency     Vitamin D deficiency 2020     Past Surgical History:   Procedure Laterality Date    BILATERAL BREAST REDUCTION Bilateral 2022    Procedure: BREAST REDUCTION;  Surgeon: Zeeshan Bone MD;  Location: [facility] [place] [facility] [place];  Service: Plastics;  Laterality: Bilateral;    BREAST BIOPSY Right      SECTION      CHOLECYSTECTOMY      COLONOSCOPY      CYSTOSCOPY BOTOX INJECTION OF BLADDER  2019    ENDOSCOPY      HERNIA REPAIR      HYSTERECTOMY  2017    WISDOM TOOTH EXTRACTION       Family History   Problem Relation Age of Onset    Stroke Mother     Diabetes Mother     Restless legs syndrome Mother     Nephrolithiasis Father     Sleep apnea Father     Restless legs syndrome Sister     Colon cancer Maternal Grandmother         50S    Colon cancer Paternal  Grandfather         70S    Diabetes Maternal Uncle     Breast cancer Other         20S    Diabetes Other      Social History     Socioeconomic History    Marital status:    Tobacco Use    Smoking status: Never     Passive exposure: Never    Smokeless tobacco: Never   Vaping Use    Vaping Use: Never used   Substance and Sexual Activity    Alcohol use: Never    Drug use: Never    Sexual activity: Defer     Allergies   Allergen Reactions    Pravastatin Sodium Nausea Only    Hydrocodone-Acetaminophen Hallucinations     OK TO TAKE REGULAR TYLENOL     Lisinopril Cough    Pollen Extract Other (See Comments)     CONGESTION, WATERY EYES, SNEEZING        Current Outpatient Medications:     amLODIPine (NORVASC) 10 MG tablet, Take 1 tablet by mouth Daily., Disp: 30 tablet, Rfl: 5    ARIPiprazole (ABILIFY) 5 MG tablet, Take 1.5 tablets by mouth Daily., Disp: 45 tablet, Rfl: 5    Blood Glucose Monitoring Suppl (FreeStyle Lite) w/Device kit, , Disp: , Rfl:     buPROPion XL (WELLBUTRIN XL) 150 MG 24 hr tablet, Take 1 tablet by mouth Daily., Disp: , Rfl:     Calcium Carbonate 1500 (600 Ca) MG tablet, TAKE ONE TABLET BY MOUTH ONCE DAILY, Disp: 30 tablet, Rfl: 4    cetirizine (zyrTEC) 10 MG tablet, Take 1 tablet by mouth Daily., Disp: , Rfl:     cimetidine (TAGAMET) 200 MG tablet, Take 1 tablet by mouth 2 (Two) Times a Day Before Meals., Disp: 60 tablet, Rfl: 5    Continuous Blood Gluc Sensor (FreeStyle Harley 3 Sensor) misc, Use 1 each Every 14 (Fourteen) Days., Disp: 2 each, Rfl: 5    cyanocobalamin (VITAMIN B-12) 1000 MCG tablet, Vitamin B-12 1,000 mcg oral tablet take 1 tablet by oral route daily   Active, Disp: , Rfl:     DULoxetine (CYMBALTA) 60 MG capsule, Take 1 capsule by mouth Daily., Disp: 30 capsule, Rfl: 5    ezetimibe (Zetia) 10 MG tablet, Take 1 tablet by mouth Daily., Disp: 30 tablet, Rfl: 5    FREESTYLE LITE test strip, 1 each by Other route Daily for 180 days. Use as instructed, Disp: 90 each, Rfl: 1     gabapentin (NEURONTIN) 100 MG capsule, Take 1 capsule twice a day by oral route for 30 days., Disp: , Rfl:     gabapentin (NEURONTIN) 300 MG capsule, Take 1 capsule every day by oral route at bedtime for 30 days., Disp: , Rfl:     hydroCHLOROthiazide (HYDRODIURIL) 12.5 MG tablet, Take 1 tablet by mouth Daily., Disp: 30 tablet, Rfl: 5    Insulin Glargine, 1 Unit Dial, (Toujeo SoloStar) 300 UNIT/ML solution pen-injector injection, Inject 83 Units under the skin into the appropriate area as directed Daily for 180 days., Disp: 24.9 mL, Rfl: 1    iron polysaccharides (Ferrex 150) 150 MG capsule, Take 1 capsule by mouth Daily., Disp: 90 capsule, Rfl: 1    Lancets (freestyle) lancets, 1 each by Other route Daily for 90 days. Use as instructed, Disp: 100 each, Rfl: 5    levothyroxine (SYNTHROID, LEVOTHROID) 25 MCG tablet, Take 1 tablet by mouth Daily., Disp: 30 tablet, Rfl: 5    losartan (COZAAR) 25 MG tablet, Take 1 tablet by mouth 2 (two) times a day., Disp: 60 tablet, Rfl: 3    meloxicam (MOBIC) 7.5 MG tablet, Take 1 tablet by mouth Daily., Disp: 30 tablet, Rfl: 5    montelukast (SINGULAIR) 10 MG tablet, Take 1 tablet by mouth Every Evening., Disp: 30 tablet, Rfl: 5    ondansetron (Zofran) 4 MG tablet, Take 1 tablet by mouth Every 8 (Eight) Hours As Needed for Nausea or Vomiting., Disp: 30 tablet, Rfl: 0    pantoprazole (PROTONIX) 40 MG EC tablet, Take 1 tablet by mouth Daily., Disp: 30 tablet, Rfl: 5    ProAir RespiClick 108 (90 Base) MCG/ACT inhaler, Inhale 1 puff every 4 (four) hours as needed for wheezing., Disp: 1 g, Rfl: 1    promethazine (PHENERGAN) 12.5 MG tablet, Take 1 tablet by mouth Every 6 (Six) Hours As Needed for Nausea or Vomiting., Disp: 20 tablet, Rfl: 0    saccharomyces boulardii (FLORASTOR) 250 MG capsule, Take 1 capsule by mouth 2 (Two) Times a Day., Disp: , Rfl:     solifenacin (VESICARE) 5 MG tablet, Vesicare 5 mg oral tablet take 1 tablet (5 mg) by oral route once daily   Suspended, Disp: , Rfl:      topiramate (TOPAMAX) 50 MG tablet, , Disp: , Rfl:     traZODone (DESYREL) 50 MG tablet, Take 1 tablet by mouth Every Night., Disp: 30 tablet, Rfl: 5    vitamin D (ERGOCALCIFEROL) 1.25 MG (74010 UT) capsule capsule, Take 1 capsule by mouth 1 (One) Time Per Week., Disp: 5 capsule, Rfl: 5    Vitamin E 90 MG (200 UNIT) capsule, Take 1 capsule by mouth Daily., Disp: 30 capsule, Rfl: 5    Insulin Pen Needle (Pen Needles) 32G X 4 MM misc, Use 1 each Daily., Disp: 100 each, Rfl: 5    Semaglutide, 2 MG/DOSE, (Ozempic, 2 MG/DOSE,) 8 MG/3ML solution pen-injector, Inject 2 mg under the skin into the appropriate area as directed Every 7 (Seven) Days for 90 days., Disp: 9 mL, Rfl: 1    Objective     Vitals:    12/19/23 0958   BP: 120/82   BP Location: Left arm   Patient Position: Sitting   Cuff Size: Large Adult   Pulse: 102   SpO2: 95%   Weight: 95.6 kg (210 lb 12.8 oz)     Body mass index is 41.17 kg/m².    Physical Exam  Constitutional:       Appearance: Normal appearance. She is normal weight. She is obese.      Comments: Severe Obesity (BMI >= 40) Pt Current BMI = 41.17      HENT:      Head: Normocephalic and atraumatic.      Right Ear: External ear normal.      Left Ear: External ear normal.      Nose: Nose normal.   Eyes:      Extraocular Movements: Extraocular movements intact.      Conjunctiva/sclera: Conjunctivae normal.   Pulmonary:      Effort: Pulmonary effort is normal.   Musculoskeletal:         General: Normal range of motion.      Cervical back: Normal range of motion.   Skin:     General: Skin is warm and dry.   Neurological:      General: No focal deficit present.      Mental Status: She is alert and oriented to person, place, and time. Mental status is at baseline.   Psychiatric:         Mood and Affect: Mood normal.         Behavior: Behavior normal.         Thought Content: Thought content normal.         Judgment: Judgment normal.             Result Review :   The following data was reviewed by: Marilee  RAHEEM Diaz on 12/19/2023:    Most Recent A1C          11/15/2023    09:28   HGBA1C Most Recent   Hemoglobin A1C 11.20        A1C Last 3 Results          9/12/2023    11:36 10/6/2023    16:23 11/15/2023    09:28   HGBA1C Last 3 Results   Hemoglobin A1C 12.7  12.5  11.20      A1c collected on 11/15/2023  is 11.2%, indicating Uncontrolled Type II diabetes.  This result is up from the prior result of 12.5% collected on 10/6/2023    Glucose   Date Value Ref Range Status   12/19/2023 216 (H) 70 - 99 mg/dL Final     Comment:     Serial Number: [id number]:  [id number]   09/22/2023 394 (H) 70 - 110 mg/dL Final     Point of care glucose in the office today is  elevated  mg/dL    Creatinine   Date Value Ref Range Status   11/15/2023 0.62 0.57 - 1.00 mg/dL Final   05/15/2023 0.60 0.57 - 1.00 mg/dL Final     eGFR   Date Value Ref Range Status   11/15/2023 106.0 >60.0 mL/min/1.73 Final   05/15/2023 107.5 >60.0 mL/min/1.73 Final     Labs collected on 11/15/2023 show Normal values    Microalbumin, Urine   Date Value Ref Range Status   11/15/2023 <1.2 mg/dL Final   05/15/2023 2.1 mg/dL Final     Creatinine, Urine   Date Value Ref Range Status   03/29/2022 99.0 mg/dL Final     Microalbumin/Creatinine Ratio   Date Value Ref Range Status   03/29/2022 44.4 mg/g Final   12/14/2020 15.2 0.0 - 35.0 mg/g[Cre] Final     Urine microalbuminuria collected on 11/15/2023 is negative for microalbuminuria    Total Cholesterol   Date Value Ref Range Status   11/15/2023 251 (H) 0 - 200 mg/dL Final   05/15/2023 266 (H) 0 - 200 mg/dL Final     Triglycerides   Date Value Ref Range Status   11/15/2023 530 (H) 0 - 150 mg/dL Final   05/15/2023 552 (H) 0 - 150 mg/dL Final     HDL Cholesterol   Date Value Ref Range Status   11/15/2023 34 (L) 40 - 60 mg/dL Final   05/15/2023 39 (L) 40 - 60 mg/dL Final     LDL Cholesterol    Date Value Ref Range Status   11/15/2023 122 (H) 0 - 100 mg/dL Final   05/15/2023 127 (H) 0 - 100 mg/dL Final      Lipid panel collected on 11/15/2023 shows Hyperlipidemia, Hypercholesterolemia, Hypertriglyceridemia, and low HDL            Assessment: Patient is here today for 1 month follow-up on her diabetes.  Last visit she was placed on a insulin taper she was to taper up her Toujeo by 3 units every 3 days and your fasting blood sugar was less than 150 then she was to remain on that dose.  States she has tapered up to 80 units but her glucose is still running 216 to 300 mg/dL.  Last visit her dose of Ozempic was increased as well to 1 mg once weekly.  She states she is tolerating the increased dose without any side effects.  Reviewed CGM report with patient in the office today.The 14-day sensor report shows an average glucose of 259 mg/dL, with 1% in target range ( mgdL), 42% in the high range (181-250 mg/dL), 57% in the very high range (>250 mg/dL), 0% in the low range (54-70 mg/dL) and 0% in the very low range (<54 mg/dL).       Diagnoses and all orders for this visit:    1. Type 2 diabetes mellitus with hyperglycemia, unspecified whether long term insulin use (Primary)  -     Semaglutide, 2 MG/DOSE, (Ozempic, 2 MG/DOSE,) 8 MG/3ML solution pen-injector; Inject 2 mg under the skin into the appropriate area as directed Every 7 (Seven) Days for 90 days.  Dispense: 9 mL; Refill: 1  -     Insulin Glargine, 1 Unit Dial, (Toujeo SoloStar) 300 UNIT/ML solution pen-injector injection; Inject 83 Units under the skin into the appropriate area as directed Daily for 180 days.  Dispense: 24.9 mL; Refill: 1  -     Insulin Pen Needle (Pen Needles) 32G X 4 MM misc; Use 1 each Daily.  Dispense: 100 each; Refill: 5  -     Continuous Blood Gluc Sensor (FreeStyle Harley 3 Sensor) misc; Use 1 each Every 14 (Fourteen) Days.  Dispense: 2 each; Refill: 5  -     Lancets (freestyle) lancets; 1 each by Other route Daily for 90 days. Use as instructed  Dispense: 100 each; Refill: 5  -     FREESTYLE LITE test strip; 1 each by Other route Daily  for 180 days. Use as instructed  Dispense: 90 each; Refill: 1    2. Uncontrolled type 2 diabetes mellitus with hyperglycemia  -     Semaglutide, 2 MG/DOSE, (Ozempic, 2 MG/DOSE,) 8 MG/3ML solution pen-injector; Inject 2 mg under the skin into the appropriate area as directed Every 7 (Seven) Days for 90 days.  Dispense: 9 mL; Refill: 1  -     Insulin Glargine, 1 Unit Dial, (Toujeo SoloStar) 300 UNIT/ML solution pen-injector injection; Inject 83 Units under the skin into the appropriate area as directed Daily for 180 days.  Dispense: 24.9 mL; Refill: 1  -     Insulin Pen Needle (Pen Needles) 32G X 4 MM misc; Use 1 each Daily.  Dispense: 100 each; Refill: 5  -     Continuous Blood Gluc Sensor (FreeStyle Harley 3 Sensor) misc; Use 1 each Every 14 (Fourteen) Days.  Dispense: 2 each; Refill: 5  -     Lancets (freestyle) lancets; 1 each by Other route Daily for 90 days. Use as instructed  Dispense: 100 each; Refill: 5  -     FREESTYLE LITE test strip; 1 each by Other route Daily for 180 days. Use as instructed  Dispense: 90 each; Refill: 1    3. Severe obesity (BMI >= 40)    4. Uses self-applied continuous glucose monitoring device    5. Type 2 diabetes mellitus with hyperglycemia, without long-term current use of insulin    Other orders  -     POC Glucose        Plan: Instructed patient to continue insulin taper for Toujeo.  She is currently on 80 units once daily.  She is to increase by 3 units every 3 days until her fasting blood sugars less than 150 then she is to remain on that dose.  Increased her dose of Ozempic to 2 mg once weekly.  Instructed patient she can finish her current prescription by taking 2 injections of the 1 mg to equal 2 mg until she picks up her new prescription.    The patient will monitor her blood glucose levels per CGM.  If she develops problematic hyperglycemia or hypoglycemia or adverse drug reactions, she will contact the offic  Scheduled a 1 month follow-up.e for further  instructions.        Follow Up     Return in about 4 weeks (around 1/16/2024) for CGM Follow Up, Medication Mgmt.    Patient was given instructions and counseling regarding her condition or for health maintenance advice. Please see specific information pulled into the AVS if appropriate.     Marilee Laughlin, APRN  12/19/2023      Dictated Utilizing Dragon Dictation.  Please note that portions of this note were completed with a voice recognition program.  Part of this note may be an electronic transcription/translation of spoken language to printed text using the Dragon Dictation System.

## 2023-12-19 NOTE — PATIENT INSTRUCTIONS
Continue Toujeo 80 units once daily.  If in 3 days your fasting blood sugar remains above 150 increase to 83 units once daily continue tapering up by 3 units every 3 days until your fasting blood sugar is less than 150 then remain on that dose.    Increase Ozempic to 2 mg once weekly.  You can finish your current prescription for 1 mg by taking 2 injections to equal 2 mg and to get your new prescription.

## 2024-01-19 NOTE — PROGRESS NOTES
Chief Complaint  Diabetes (Follow up, harley )    Referred By: DONNELL Burgos    Subjective          Carlota MONTOYA Lobito presents to [facility] for diabetes medication management    History of Present Illness    Visit type:  follow-up  Diabetes type:  Type 2  Current diabetes status/concerns/issues:  Reports she is currently on Toujeo 116mg/dl and her blood sugar is still in the 200s. States she is tolerating the increased dose of Ozempic. States she seems to be staying hungry even with the increased dose of Ozempic.   Other health concerns: No new health concerns  Current Diabetes symptoms:    Polyuria: No   Polydipsia: No   Polyphagia: Yes     Blurred vision: No   Excessive fatigue: Yes    Known Diabetes complications:  Neuropathy: None; Location: N/A  Renal: None  Eyes: None; Location: N/A; Last Eye Exam:  2022 ; Location: [facility]  Amputation/Wounds: None  GI: Reflux  Cardiovascular: Hypertension and Hyperlipidemia  ED: N/A  Other: None  Hypoglycemia:  None reported at this time and 0% per CGM  Hypoglycemia Symptoms:  No hypoglycemia at this time  Current diabetes treatment:  Ozempic 2 mg once weekly, Toujeo 116 units qd    Blood glucose device:  Harley CGM  Blood glucose monitoring frequency:  Continuous per CGM  Blood glucose range/average:  The 14-day sensor report shows an average glucose of 258 mg/dL, with 6% in target range ( mgdL), 44% in the high range (181-250 mg/dL), 50% in the very high range (>250 mg/dL), 0% in the low range (54-70 mg/dL) and 0% in the very low range (<54 mg/dL).   Glucose Source: Device Reviewed  Dietary behavior:  Avoids sugary drinks, Number of meals each day - 3; Number of snacks each day - 1-2, she eliminated sugar from her diet and decreased her carb intake.   Activity/Exercise:  she has been able to be more active around the house. She is going to start exercising.        Past Medical History:   Diagnosis Date    Acne     Allergic  rhinitis due to allergen 04/15/2021    Allergies     Anemia 2020    Anesthesia     HARD TO WAKE UP POSTOP    Anxiety 04/15/2021    Asthma     INHALERS PRN    Bundle branch block, right     SEE'S DR BENNETT IN 2019, NOW SEES ON AS NEEDED BASIS, MAINLY FOLLOW PCP ROUTINELY. DENIED CP/SOB    Depression     Diabetes     Diabetes mellitus, type 2 2020    DOESN'T CHECK BG AT HOME    Essential hypertension 2020    Fatigue 04/15/2021    Fibromyalgia     GERD (gastroesophageal reflux disease) 2020    High cholesterol     Hyperlipidemia 2020    Hypothyroidism 2020    Inguinal hernia     Insomnia     Macromastia     Migraine     Obesity 2020    DANY (obstructive sleep apnea) 2020    Palpitation     NO PROBLEMS CURRENTLY. FOLLOWS PCP    Right bundle branch block 2020    SEE'S DR BLUE ON AS NEEDED BASIS, SEE'S PCP ON YEARLY. DENIED CP/SOB    RLS (restless legs syndrome)     Seasonal allergies     Sinus trouble     Stomach pain 2016    Stress 2016    Thyroid disease     Urinary incontinence     Vitamin B 12 deficiency     Vitamin D deficiency 2020     Past Surgical History:   Procedure Laterality Date    BILATERAL BREAST REDUCTION Bilateral 2022    Procedure: BREAST REDUCTION;  Surgeon: Zeeshan Bone MD;  [facility]: [facility]  Service: Plastics;  Laterality: Bilateral;    BREAST BIOPSY Right      SECTION      CHOLECYSTECTOMY      COLONOSCOPY      CYSTOSCOPY BOTOX INJECTION OF BLADDER  2019    ENDOSCOPY      HERNIA REPAIR      HYSTERECTOMY  2017    WISDOM TOOTH EXTRACTION       Family History   Problem Relation Age of Onset    Stroke Mother     Diabetes Mother     Restless legs syndrome Mother     Nephrolithiasis Father     Sleep apnea Father     Restless legs syndrome Sister     Colon cancer Maternal Grandmother         50S    Colon cancer Paternal Grandfather         70S    Diabetes Maternal Uncle     Breast cancer Other         20S     Diabetes Other      Social History     Socioeconomic History    Marital status:    Tobacco Use    Smoking status: Never     Passive exposure: Never    Smokeless tobacco: Never   Vaping Use    Vaping Use: Never used   Substance and Sexual Activity    Alcohol use: Never    Drug use: Never    Sexual activity: Defer     Allergies   Allergen Reactions    Pravastatin Sodium Nausea Only    Hydrocodone-Acetaminophen Hallucinations     OK TO TAKE REGULAR TYLENOL     Lisinopril Cough    Pollen Extract Other (See Comments)     CONGESTION, WATERY EYES, SNEEZING        Current Outpatient Medications:     amLODIPine (NORVASC) 10 MG tablet, Take 1 tablet by mouth Daily., Disp: 30 tablet, Rfl: 5    ARIPiprazole (ABILIFY) 5 MG tablet, Take 1.5 tablets by mouth Daily., Disp: 45 tablet, Rfl: 5    Blood Glucose Monitoring Suppl (FreeStyle Lite) w/Device kit, , Disp: , Rfl:     buPROPion XL (WELLBUTRIN XL) 150 MG 24 hr tablet, Take 1 tablet by mouth Daily., Disp: , Rfl:     Calcium Carbonate 1500 (600 Ca) MG tablet, TAKE ONE TABLET BY MOUTH ONCE DAILY, Disp: 30 tablet, Rfl: 4    cetirizine (zyrTEC) 10 MG tablet, Take 1 tablet by mouth Daily., Disp: , Rfl:     cimetidine (TAGAMET) 200 MG tablet, Take 1 tablet by mouth 2 (Two) Times a Day Before Meals., Disp: 60 tablet, Rfl: 5    Continuous Blood Gluc Sensor (FreeStyle Harley 3 Sensor) misc, Use 1 each Every 14 (Fourteen) Days., Disp: 2 each, Rfl: 5    cyanocobalamin (VITAMIN B-12) 1000 MCG tablet, Vitamin B-12 1,000 mcg oral tablet take 1 tablet by oral route daily   Active, Disp: , Rfl:     DULoxetine (CYMBALTA) 60 MG capsule, Take 1 capsule by mouth Daily., Disp: 30 capsule, Rfl: 5    ezetimibe (Zetia) 10 MG tablet, Take 1 tablet by mouth Daily., Disp: 30 tablet, Rfl: 5    FREESTYLE LITE test strip, 1 each by Other route Daily for 180 days. Use as instructed, Disp: 90 each, Rfl: 1    gabapentin (NEURONTIN) 100 MG capsule, Take 1 capsule twice a day by oral route for 30 days.,  Disp: , Rfl:     gabapentin (NEURONTIN) 300 MG capsule, Take 1 capsule every day by oral route at bedtime for 30 days., Disp: , Rfl:     hydroCHLOROthiazide (HYDRODIURIL) 12.5 MG tablet, Take 1 tablet by mouth Daily., Disp: 30 tablet, Rfl: 5    Insulin Glargine, 1 Unit Dial, (Toujeo SoloStar) 300 UNIT/ML solution pen-injector injection, Inject 60 Units under the skin into the appropriate area as directed 2 (Two) Times a Day for 180 days., Disp: 36 mL, Rfl: 1    Insulin Pen Needle (Pen Needles) 32G X 4 MM misc, Use 1 each 5 (Five) Times a Day., Disp: 450 each, Rfl: 1    iron polysaccharides (Ferrex 150) 150 MG capsule, Take 1 capsule by mouth Daily., Disp: 90 capsule, Rfl: 1    Lancets (freestyle) lancets, 1 each by Other route Daily for 90 days. Use as instructed, Disp: 100 each, Rfl: 5    levothyroxine (SYNTHROID, LEVOTHROID) 25 MCG tablet, Take 1 tablet by mouth Daily., Disp: 30 tablet, Rfl: 5    losartan (COZAAR) 25 MG tablet, Take 1 tablet by mouth 2 (two) times a day., Disp: 60 tablet, Rfl: 3    meloxicam (MOBIC) 7.5 MG tablet, Take 1 tablet by mouth Daily., Disp: 30 tablet, Rfl: 5    montelukast (SINGULAIR) 10 MG tablet, Take 1 tablet by mouth Every Evening., Disp: 30 tablet, Rfl: 5    ondansetron (Zofran) 4 MG tablet, Take 1 tablet by mouth Every 8 (Eight) Hours As Needed for Nausea or Vomiting., Disp: 30 tablet, Rfl: 0    pantoprazole (PROTONIX) 40 MG EC tablet, Take 1 tablet by mouth Daily., Disp: 30 tablet, Rfl: 5    ProAir RespiClick 108 (90 Base) MCG/ACT inhaler, Inhale 1 puff every 4 (four) hours as needed for wheezing., Disp: 1 g, Rfl: 1    promethazine (PHENERGAN) 12.5 MG tablet, Take 1 tablet by mouth Every 6 (Six) Hours As Needed for Nausea or Vomiting., Disp: 20 tablet, Rfl: 0    saccharomyces boulardii (FLORASTOR) 250 MG capsule, Take 1 capsule by mouth 2 (Two) Times a Day., Disp: , Rfl:     solifenacin (VESICARE) 5 MG tablet, Vesicare 5 mg oral tablet take 1 tablet (5 mg) by oral route once daily  "  Suspended, Disp: , Rfl:     topiramate (TOPAMAX) 50 MG tablet, , Disp: , Rfl:     traZODone (DESYREL) 50 MG tablet, Take 1 tablet by mouth Every Night., Disp: 30 tablet, Rfl: 5    vitamin D (ERGOCALCIFEROL) 1.25 MG (27684 UT) capsule capsule, Take 1 capsule by mouth 1 (One) Time Per Week., Disp: 5 capsule, Rfl: 5    Vitamin E 90 MG (200 UNIT) capsule, Take 1 capsule by mouth Daily., Disp: 30 capsule, Rfl: 5    Insulin Lispro, 1 Unit Dial, (HumaLOG KwikPen) 100 UNIT/ML solution pen-injector, Per sliding scale tid with meals. Max daily dose of 30 units, Disp: 27 mL, Rfl: 1    Tirzepatide (Mounjaro) 7.5 MG/0.5ML solution pen-injector pen, Inject 0.5 mL under the skin into the appropriate area as directed Every 7 (Seven) Days., Disp: 2 mL, Rfl: 0    Objective     Vitals:    01/23/24 0945   BP: 124/84   BP Location: Right arm   Patient Position: Sitting   Cuff Size: Adult   Pulse: 100   SpO2: 95%   Weight: 97.3 kg (214 lb 6.4 oz)   Height: 152.4 cm (60\")     Body mass index is 41.87 kg/m².    Physical Exam  Constitutional:       Appearance: Normal appearance. She is normal weight. She is obese.      Comments: Severe Obesity (BMI >= 40) Pt Current BMI = 41.87     HENT:      Head: Normocephalic and atraumatic.      Right Ear: External ear normal.      Left Ear: External ear normal.      Nose: Nose normal.   Eyes:      Extraocular Movements: Extraocular movements intact.      Conjunctiva/sclera: Conjunctivae normal.   Pulmonary:      Effort: Pulmonary effort is normal.   Musculoskeletal:         General: Normal range of motion.      Cervical back: Normal range of motion.   Skin:     General: Skin is warm and dry.   Neurological:      General: No focal deficit present.      Mental Status: She is alert and oriented to person, place, and time. Mental status is at baseline.   Psychiatric:         Mood and Affect: Mood normal.         Behavior: Behavior normal.         Thought Content: Thought content normal.         Judgment: " Judgment normal.             Result Review :   The following data was reviewed by: RAHEEM Garcia on 01/23/2024:    Most Recent A1C          1/23/2024    09:56   HGBA1C Most Recent   Hemoglobin A1C 9.7        A1C Last 3 Results          10/6/2023    16:23 11/15/2023    09:28 1/23/2024    09:56   HGBA1C Last 3 Results   Hemoglobin A1C 12.5  11.20  9.7      A1c collected in the office today is 9.7%, indicating Uncontrolled Type II diabetes.  This result is down from the prior result of 11.2% collected on 11/15/2023    Glucose   Date Value Ref Range Status   01/23/2024 245 (H) 70 - 99 mg/dL Final     Comment:     Serial Number: [id number]:  [id number]   09/22/2023 394 (H) 70 - 110 mg/dL Final     Point of care glucose in the office today is  elevated at 245 mg/dL    Creatinine   Date Value Ref Range Status   11/15/2023 0.62 0.57 - 1.00 mg/dL Final   05/15/2023 0.60 0.57 - 1.00 mg/dL Final     eGFR   Date Value Ref Range Status   11/15/2023 106.0 >60.0 mL/min/1.73 Final   05/15/2023 107.5 >60.0 mL/min/1.73 Final     Labs collected on 11/15/2023 show Normal values    Microalbumin, Urine   Date Value Ref Range Status   11/15/2023 <1.2 mg/dL Final   05/15/2023 2.1 mg/dL Final     Creatinine, Urine   Date Value Ref Range Status   03/29/2022 99.0 mg/dL Final     Microalbumin/Creatinine Ratio   Date Value Ref Range Status   03/29/2022 44.4 mg/g Final   12/14/2020 15.2 0.0 - 35.0 mg/g[Cre] Final     Urine microalbuminuria collected on 11/15/2023 is negative for microalbuminuria    Total Cholesterol   Date Value Ref Range Status   11/15/2023 251 (H) 0 - 200 mg/dL Final   05/15/2023 266 (H) 0 - 200 mg/dL Final     Triglycerides   Date Value Ref Range Status   11/15/2023 530 (H) 0 - 150 mg/dL Final   05/15/2023 552 (H) 0 - 150 mg/dL Final     HDL Cholesterol   Date Value Ref Range Status   11/15/2023 34 (L) 40 - 60 mg/dL Final   05/15/2023 39 (L) 40 - 60 mg/dL Final     LDL Cholesterol    Date Value Ref Range  Status   11/15/2023 122 (H) 0 - 100 mg/dL Final   05/15/2023 127 (H) 0 - 100 mg/dL Final     Lipid panel collected on 11/15/2023 shows Hyperlipidemia, Hypercholesterolemia, Hypertriglyceridemia, and low HDL            Assessment: Patient is here today for 1 month follow-up on her diabetes.  Last visit her Toujeo was increased to 80 units daily and she was to increase by 3 units every 3 days and her fasting blood sugars less than 150.  She reports she is currently up to 116 units daily and her glucose level is still staying above 200 mg/dL.  She reports she is tolerating the increased dose of Ozempic without any side effects.  She reports she has gained 4 pound since her last visit and it has not helped control her hunger.  Reviewed CGM report with patient in the office today.The 14-day sensor report shows an average glucose of 258 mg/dL, with 6% in target range ( mgdL), 44% in the high range (181-250 mg/dL), 50% in the very high range (>250 mg/dL), 0% in the low range (54-70 mg/dL) and 0% in the very low range (<54 mg/dL).  Her A1c in the office today is 9.7% which is down from her previous A1c of 11.2% in November.    Diagnoses and all orders for this visit:    1. Uncontrolled type 2 diabetes mellitus with hyperglycemia (Primary)  -     Insulin Pen Needle (Pen Needles) 32G X 4 MM misc; Use 1 each 5 (Five) Times a Day.  Dispense: 450 each; Refill: 1  -     Insulin Glargine, 1 Unit Dial, (Toujeo SoloStar) 300 UNIT/ML solution pen-injector injection; Inject 60 Units under the skin into the appropriate area as directed 2 (Two) Times a Day for 180 days.  Dispense: 36 mL; Refill: 1    2. Type 2 diabetes mellitus with hyperglycemia, unspecified whether long term insulin use  -     POC Glycosylated Hemoglobin (Hb A1C)  -     Insulin Lispro, 1 Unit Dial, (HumaLOG KwikPen) 100 UNIT/ML solution pen-injector; Per sliding scale tid with meals. Max daily dose of 30 units  Dispense: 27 mL; Refill: 1  -     Insulin Pen  Needle (Pen Needles) 32G X 4 MM misc; Use 1 each 5 (Five) Times a Day.  Dispense: 450 each; Refill: 1  -     Insulin Glargine, 1 Unit Dial, (Toujeo SoloStar) 300 UNIT/ML solution pen-injector injection; Inject 60 Units under the skin into the appropriate area as directed 2 (Two) Times a Day for 180 days.  Dispense: 36 mL; Refill: 1    3. Severe obesity (BMI >= 40)  Comments:  Discussed diet exercise and weight loss    4. Uses self-applied continuous glucose monitoring device    Other orders  -     POC Glucose  -     Tirzepatide (Mounjaro) 7.5 MG/0.5ML solution pen-injector pen; Inject 0.5 mL under the skin into the appropriate area as directed Every 7 (Seven) Days.  Dispense: 2 mL; Refill: 0        Plan: Switch patient from Toujeo 116 units once daily to 60 units twice daily.  Instructed patient make sure to do her injections 12 hours apart.  Prescribed Humalog per sliding scale 3 times daily with meals as follows  Start using Humalog per sliding scale as directed below.  Make sure to do the injection 15 to 20 minutes prior to your meal.Take humalog insulin to correct glucose levels over 150 mg/dl as follows:  If blood glucose is less than 150 mg/dl - 0 units  If blood glucose is between 151 and 175 - 2 units  If blood glucose is between 176 and 200 - 3 units  If blood glucose is between 201 and 225 - 4 units  If blood glucose is between 226 and 250 - 5 units  If blood glucose is between 251 and 275 - 6 units  If blood glucose is between 276 and 300 - 7 units  If blood glucose is between 301 and 325 - 8 units  If blood glucose is between 326 and 350 - 9 units  If blood glucose is 351 or above - 10 units   Discontinued Ozempic and switched to Mounjaro 7.5 mg once weekly.  Scheduled a 1 month follow-up and we will review her CGM at that time.    The patient will monitor her blood glucose levels per CGM.  If she develops problematic hyperglycemia or hypoglycemia or adverse drug reactions, she will contact the office  for further instructions.        Follow Up     Return in about 4 weeks (around 2/20/2024) for CGM Follow Up, Medication Mgmt.    Patient was given instructions and counseling regarding her condition or for health maintenance advice. Please see specific information pulled into the AVS if appropriate.     Marilee Laughlin, APRN  01/23/2024      Dictated Utilizing Dragon Dictation.  Please note that portions of this note were completed with a voice recognition program.  Part of this note may be an electronic transcription/translation of spoken language to printed text using the Dragon Dictation System.

## 2024-01-23 ENCOUNTER — OFFICE VISIT (OUTPATIENT)
Dept: DIABETES SERVICES | Facility: HOSPITAL | Age: 54
End: 2024-01-23
Payer: COMMERCIAL

## 2024-01-23 VITALS
SYSTOLIC BLOOD PRESSURE: 124 MMHG | DIASTOLIC BLOOD PRESSURE: 84 MMHG | BODY MASS INDEX: 42.09 KG/M2 | WEIGHT: 214.4 LBS | HEART RATE: 100 BPM | OXYGEN SATURATION: 95 % | HEIGHT: 60 IN

## 2024-01-23 DIAGNOSIS — E11.65 UNCONTROLLED TYPE 2 DIABETES MELLITUS WITH HYPERGLYCEMIA: Primary | ICD-10-CM

## 2024-01-23 DIAGNOSIS — E66.01 SEVERE OBESITY (BMI >= 40): ICD-10-CM

## 2024-01-23 DIAGNOSIS — Z97.8 USES SELF-APPLIED CONTINUOUS GLUCOSE MONITORING DEVICE: ICD-10-CM

## 2024-01-23 DIAGNOSIS — E11.65 TYPE 2 DIABETES MELLITUS WITH HYPERGLYCEMIA, UNSPECIFIED WHETHER LONG TERM INSULIN USE: ICD-10-CM

## 2024-01-23 LAB
EXPIRATION DATE: ABNORMAL
GLUCOSE BLDC GLUCOMTR-MCNC: 245 MG/DL (ref 70–99)
HBA1C MFR BLD: 9.7 % (ref 4.5–5.7)
Lab: ABNORMAL

## 2024-01-23 PROCEDURE — 1160F RVW MEDS BY RX/DR IN RCRD: CPT | Performed by: NURSE PRACTITIONER

## 2024-01-23 PROCEDURE — 3079F DIAST BP 80-89 MM HG: CPT | Performed by: NURSE PRACTITIONER

## 2024-01-23 PROCEDURE — 1159F MED LIST DOCD IN RCRD: CPT | Performed by: NURSE PRACTITIONER

## 2024-01-23 PROCEDURE — 3074F SYST BP LT 130 MM HG: CPT | Performed by: NURSE PRACTITIONER

## 2024-01-23 PROCEDURE — 82948 REAGENT STRIP/BLOOD GLUCOSE: CPT | Performed by: NURSE PRACTITIONER

## 2024-01-23 PROCEDURE — G0463 HOSPITAL OUTPT CLINIC VISIT: HCPCS | Performed by: NURSE PRACTITIONER

## 2024-01-23 PROCEDURE — 3046F HEMOGLOBIN A1C LEVEL >9.0%: CPT | Performed by: NURSE PRACTITIONER

## 2024-01-23 PROCEDURE — 99214 OFFICE O/P EST MOD 30 MIN: CPT | Performed by: NURSE PRACTITIONER

## 2024-01-23 PROCEDURE — 95251 CONT GLUC MNTR ANALYSIS I&R: CPT | Performed by: NURSE PRACTITIONER

## 2024-01-23 RX ORDER — INSULIN GLARGINE 300 U/ML
60 INJECTION, SOLUTION SUBCUTANEOUS 2 TIMES DAILY
Qty: 36 ML | Refills: 1 | Status: SHIPPED | OUTPATIENT
Start: 2024-01-23 | End: 2024-07-21

## 2024-01-23 RX ORDER — PEN NEEDLE, DIABETIC 30 GX3/16"
1 NEEDLE, DISPOSABLE MISCELLANEOUS
Qty: 450 EACH | Refills: 1 | Status: SHIPPED | OUTPATIENT
Start: 2024-01-23

## 2024-01-23 RX ORDER — INSULIN LISPRO 100 [IU]/ML
INJECTION, SOLUTION INTRAVENOUS; SUBCUTANEOUS
Qty: 27 ML | Refills: 1 | Status: SHIPPED | OUTPATIENT
Start: 2024-01-23 | End: 2024-01-24 | Stop reason: SDUPTHER

## 2024-01-23 NOTE — PATIENT INSTRUCTIONS
Switch from taking Toujeo 116 units once daily to 60 units twice daily.  Make sure to do these injections 12 hours apart.    Start using Humalog per sliding scale as directed below.  Make sure to do the injection 15 to 20 minutes prior to your meal.Take humalog insulin to correct glucose levels over 150 mg/dl as follows:    (Add to mealtime insulin if ordered)    If blood glucose is less than 150 mg/dl - 0 units  If blood glucose is between 151 and 175 - 2 units  If blood glucose is between 176 and 200 - 3 units  If blood glucose is between 201 and 225 - 4 units  If blood glucose is between 226 and 250 - 5 units  If blood glucose is between 251 and 275 - 6 units  If blood glucose is between 276 and 300 - 7 units  If blood glucose is between 301 and 325 - 8 units  If blood glucose is between 326 and 350 - 9 units  If blood glucose is 351 or above - 10 units     You can finish your last dose of Ozempic 2 mg once weekly then you will switch to Mounjaro 7.5 mg once weekly

## 2024-01-24 ENCOUNTER — TELEPHONE (OUTPATIENT)
Dept: DIABETES SERVICES | Facility: HOSPITAL | Age: 54
End: 2024-01-24
Payer: COMMERCIAL

## 2024-01-24 DIAGNOSIS — E11.65 TYPE 2 DIABETES MELLITUS WITH HYPERGLYCEMIA, UNSPECIFIED WHETHER LONG TERM INSULIN USE: ICD-10-CM

## 2024-01-24 RX ORDER — INSULIN LISPRO 100 [IU]/ML
INJECTION, SOLUTION INTRAVENOUS; SUBCUTANEOUS
Qty: 30 ML | Refills: 1 | Status: SHIPPED | OUTPATIENT
Start: 2024-01-24

## 2024-01-24 NOTE — TELEPHONE ENCOUNTER
Carlota Robin Key: [id number] - PA Case ID: [id number] - Rx #: [id number]  Mounjaro 7.5MG/0.5ML pen-injectors

## 2024-01-31 ENCOUNTER — TELEMEDICINE (OUTPATIENT)
Dept: SLEEP MEDICINE | Facility: HOSPITAL | Age: 54
End: 2024-01-31
Payer: COMMERCIAL

## 2024-01-31 DIAGNOSIS — G47.33 OSA (OBSTRUCTIVE SLEEP APNEA): ICD-10-CM

## 2024-01-31 DIAGNOSIS — E66.01 MORBID (SEVERE) OBESITY DUE TO EXCESS CALORIES: ICD-10-CM

## 2024-01-31 DIAGNOSIS — G47.09 OTHER INSOMNIA: ICD-10-CM

## 2024-01-31 DIAGNOSIS — I45.10 BUNDLE BRANCH BLOCK, RIGHT: Primary | ICD-10-CM

## 2024-01-31 NOTE — PROGRESS NOTES
Follow Up [facility] Note     Chief Complaint: Apnea    Primary Care Physician: Audra Tran APRN    This visit was conducted with real-time audiovisual technology    Interval History:   The patient is a 53 y.o. female  who  last saw repair on 10/27/2023 and that note was reviewed.  Care reviewed with her the download and did not recommend any changes to pressure.  She was using a fullface mask at the time although subsequently has tried a oral nasal combination mask.  The over the nose mask caused skin breakdown the under the nose mask is associated with more leak.  She says she wakes up trying to resolve the leak.    She had a very severe AHI of 93.7/h on her diagnostic home sleep apnea test.    Review of Systems:    A complete review of systems was done and all were negative with the exception of skin breakdown from her nasal mask, over the nose, and leak with the under the nose mask.    Social History:    Social History     Socioeconomic History    Marital status:    Tobacco Use    Smoking status: Never     Passive exposure: Never    Smokeless tobacco: Never   Vaping Use    Vaping Use: Never used   Substance and Sexual Activity    Alcohol use: Never    Drug use: Never    Sexual activity: Defer       Allergies:  Pravastatin sodium, Hydrocodone-acetaminophen, Lisinopril, and Pollen extract     Medication Review:  Reviewed.      Vital Signs:  There were no vitals filed for this visit.  There is no height or weight on file to calculate BMI.    Physical Exam:    Well-looking patient who did not appear in any distress nor did she appear sleepy nor short of breath.  Self-administered [place] Sleepiness Scale test results: Was not completed             Home Sleep Apnea Study Report.      Recording  Device: More Night One (FDA approved Type III (CPT 27329)     Patient name:Carlota Robin  Med record: [id number]   YOB: 1970    Age:53 y.o.  Sex:female     Date of study:  08/23/2023.  Date of interpretation: 8/25/2023   I have reviewed the study and the raw data. This report is generated by me after reviewing the raw data.  Study was performed based on the standardized protocol and it met the technical quality criteria per AASM.  The study was scored per AASM/Medicare guidelines using 4% desaturation for hypopneas.     Times and durations of the home sleep apnea test:  Total recording time (TRT) 631 minutes, time in bed(TB) 631 minutes and monitor time (MT) 622 minutes     Respiratory events:   There were 971 events of apnea and hypopnea during the home sleep apnea test.  ESTEFANIA (respiratory event index) is 93.7 /hr, suggesting severe obstructive sleep apnea (Less than 5/hr is normal)          Impression:     Encounter Diagnoses   Name Primary?    Bundle branch block, right Yes    DANY (obstructive sleep apnea)     Other insomnia     Morbid (severe) obesity due to excess calories          Plan:  Good sleep hygiene measures should be maintained.  After evaluating the patient and assessing results available, the patient is benefiting from the treatment being provided.     The patient will continue auto CPAP.  Potential side effects of CPAP therapy reviewed and addressed as needed.  After clinical evaluation and review of downloads, I recommend no changes to the patient's pressures.  Commended changes were made to her CPAP therapy.    It is not entirely clear to me nor the patient why I am seeing the patient in follow-up rather than Dr. Matthews but I think she could be seen in a year as long as she continues to do well.  She has met compliance criteria more than 70% of nights used in more than 4 hours per night.  I answered all of the patient's questions.  The patient will call the [facility] for any problems and will follow up in near.      Paxton Ivy MD  Sleep Medicine  01/31/24  12:12 EST

## 2024-02-12 ENCOUNTER — TELEPHONE (OUTPATIENT)
Dept: DIABETES SERVICES | Facility: HOSPITAL | Age: 54
End: 2024-02-12
Payer: COMMERCIAL

## 2024-02-12 RX ORDER — TIRZEPATIDE 7.5 MG/.5ML
INJECTION, SOLUTION SUBCUTANEOUS
Qty: 2 ML | Refills: 0 | Status: SHIPPED | OUTPATIENT
Start: 2024-02-12

## 2024-02-15 DIAGNOSIS — N30.00 ACUTE CYSTITIS WITHOUT HEMATURIA: ICD-10-CM

## 2024-02-15 DIAGNOSIS — N32.81 OAB (OVERACTIVE BLADDER): Primary | ICD-10-CM

## 2024-02-15 RX ORDER — LEVOFLOXACIN 500 MG/1
500 TABLET, FILM COATED ORAL DAILY
Qty: 7 TABLET | Refills: 0 | Status: SHIPPED | OUTPATIENT
Start: 2024-02-27 | End: 2024-03-05

## 2024-02-21 ENCOUNTER — HOSPITAL ENCOUNTER (OUTPATIENT)
Dept: MAMMOGRAPHY | Facility: HOSPITAL | Age: 54
Discharge: HOME | End: 2024-02-21
Admitting: NURSE PRACTITIONER
Payer: COMMERCIAL

## 2024-02-21 DIAGNOSIS — Z12.31 SCREENING MAMMOGRAM FOR BREAST CANCER: ICD-10-CM

## 2024-02-21 PROCEDURE — 77063 BREAST TOMOSYNTHESIS BI: CPT

## 2024-02-21 PROCEDURE — 77067 SCR MAMMO BI INCL CAD: CPT

## 2024-02-21 NOTE — PROGRESS NOTES
Chief Complaint  Diabetes (Follow up, tenzin )    Referred By: DONNELL Burgos    Subjective          Carlota Robin presents to [facility] for diabetes medication management    History of Present Illness    Visit type:  follow-up  Diabetes type:  Type 2  Current diabetes status/concerns/issues:  States her glucose levels have improved with switching the toujeo from 116 units once day to 60 units bid and adding the Humalog sliding scale. States adding the Humalog has helped postprandial hyperglycemia. States she is tolerating the Mounjaro w/o any side effects. She has not been on her sensor for about a wk as she needs refills.  States she has been checking her glucose levels 3 times daily and her fasting blood sugars running 139-264 and nonfasting 140-269.  States she has started following a gluten-free diet.  Other health concerns: No new health concerns  Current Diabetes symptoms:    Polyuria: No   Polydipsia: No   Polyphagia: Yes     Blurred vision: Yes     Excessive fatigue: Yes    Known Diabetes complications:  Neuropathy: None; Location: N/A  Renal: None  Eyes: None; Location: N/A; Last Eye Exam:  2022 ; Location: [facility]  Amputation/Wounds: None  GI: Reflux  Cardiovascular: Hypertension and Hyperlipidemia  ED: N/A  Other: None  Hypoglycemia:  None reported at this time  Hypoglycemia Symptoms:  No hypoglycemia at this time  Current diabetes treatment:  Mounjaro 7.5mg weekly, Toujeo 60 units bid, Humalog per sliding scale for glucose over 150mg/dl  Blood glucose device:  Meter  Blood glucose monitoring frequency:  3  Blood glucose range/average:  The 14-day sensor report shows an average glucose of 245mg/dL, with 6% in target range ( mgdL), 52% in the high range (181-250 mg/dL), 42% in the very high range (>250 mg/dL), 0% in the low range (54-70 mg/dL) and 0% in the very low range (<54 mg/dL).  and fasting blood sugar of 139-264 and nonfasting 140-269  Glucose  Source: Device Reviewed  Dietary behavior:  Avoids sugary drinks, Number of meals each day - 3; Number of snacks each day - 1-2, she eliminated sugar from her diet and decreased her carb intake.   Activity/Exercise:  she has been able to be more active around the house. She is going to start exercising.     Past Medical History:   Diagnosis Date    Acne     Allergic rhinitis due to allergen 04/15/2021    Allergies     Anemia 2020    Anesthesia     HARD TO WAKE UP POSTOP    Anxiety 04/15/2021    Asthma     INHALERS PRN    Bundle branch block, right     SEE'S DR BENNETT IN 2019, NOW SEES ON AS NEEDED BASIS, MAINLY FOLLOW PCP ROUTINELY. DENIED CP/SOB    Depression     Diabetes     Diabetes mellitus, type 2 2020    DOESN'T CHECK BG AT HOME    Essential hypertension 2020    Fatigue 04/15/2021    Fibromyalgia     GERD (gastroesophageal reflux disease) 2020    High cholesterol     Hyperlipidemia 2020    Hypothyroidism 2020    Inguinal hernia     Insomnia     Macromastia     Migraine     Obesity 2020    DANY (obstructive sleep apnea) 2020    Palpitation     NO PROBLEMS CURRENTLY. FOLLOWS PCP    Right bundle branch block 2020    SEE'S DR BLUE ON AS NEEDED BASIS, SEE'S PCP ON YEARLY. DENIED CP/SOB    RLS (restless legs syndrome)     Seasonal allergies     Sinus trouble     Stomach pain 2016    Stress 2016    Thyroid disease     Urinary incontinence     Vitamin B 12 deficiency     Vitamin D deficiency 2020     Past Surgical History:   Procedure Laterality Date    BILATERAL BREAST REDUCTION Bilateral 2022    Procedure: BREAST REDUCTION;  Surgeon: Zeeshan Bone MD;  [place] [facility] [place] [facility] [place]  Service: [place];  Laterality: Bilateral;    BREAST BIOPSY Right      SECTION      CHOLECYSTECTOMY      COLONOSCOPY      CYSTOSCOPY BOTOX INJECTION OF BLADDER  2019    ENDOSCOPY      HERNIA REPAIR      HYSTERECTOMY  2017    WISDOM TOOTH EXTRACTION        Family History   Problem Relation Age of Onset    Stroke Mother     Diabetes Mother     Restless legs syndrome Mother     Nephrolithiasis Father     Sleep apnea Father     Restless legs syndrome Sister     Colon cancer Maternal Grandmother         50S    Colon cancer Paternal Grandfather         70S    Diabetes Maternal Uncle     Breast cancer Other         20S    Diabetes Other      Social History     Socioeconomic History    Marital status:    Tobacco Use    Smoking status: Never     Passive exposure: Never    Smokeless tobacco: Never   Vaping Use    Vaping Use: Never used   Substance and Sexual Activity    Alcohol use: Never    Drug use: Never    Sexual activity: Defer     Allergies   Allergen Reactions    Pravastatin Sodium Nausea Only    Hydrocodone-Acetaminophen Hallucinations     OK TO TAKE REGULAR TYLENOL     Lisinopril Cough    Pollen Extract Other (See Comments)     CONGESTION, WATERY EYES, SNEEZING        Current Outpatient Medications:     Continuous Blood Gluc Sensor (FreeStyle Harley 3 Sensor) misc, Use 1 each Every 14 (Fourteen) Days., Disp: 2 each, Rfl: 5    Insulin Pen Needle (Pen Needles) 32G X 4 MM misc, Use 1 each 5 (Five) Times a Day., Disp: 450 each, Rfl: 1    lisinopril (PRINIVIL,ZESTRIL) 5 MG tablet, Take 1 tablet by mouth Daily., Disp: , Rfl:     amLODIPine (NORVASC) 10 MG tablet, Take 1 tablet by mouth Daily., Disp: 30 tablet, Rfl: 5    ARIPiprazole (ABILIFY) 5 MG tablet, Take 1.5 tablets by mouth Daily., Disp: 45 tablet, Rfl: 5    Blood Glucose Monitoring Suppl (FreeStyle Lite) w/Device kit, , Disp: , Rfl:     buPROPion XL (WELLBUTRIN XL) 150 MG 24 hr tablet, Take 1 tablet by mouth Daily., Disp: , Rfl:     Calcium Carbonate 1500 (600 Ca) MG tablet, TAKE ONE TABLET BY MOUTH ONCE DAILY, Disp: 30 tablet, Rfl: 4    cetirizine (zyrTEC) 10 MG tablet, Take 1 tablet by mouth Daily., Disp: , Rfl:     cimetidine (TAGAMET) 200 MG tablet, Take 1 tablet by mouth 2 (Two) Times a Day Before  Meals., Disp: 60 tablet, Rfl: 5    cyanocobalamin (VITAMIN B-12) 1000 MCG tablet, Vitamin B-12 1,000 mcg oral tablet take 1 tablet by oral route daily   Active, Disp: , Rfl:     DULoxetine (CYMBALTA) 60 MG capsule, Take 1 capsule by mouth Daily., Disp: 30 capsule, Rfl: 5    ezetimibe (Zetia) 10 MG tablet, Take 1 tablet by mouth Daily., Disp: 30 tablet, Rfl: 5    FREESTYLE LITE test strip, 1 each by Other route Daily for 180 days. Use as instructed, Disp: 90 each, Rfl: 1    gabapentin (NEURONTIN) 100 MG capsule, Take 1 capsule twice a day by oral route for 30 days., Disp: , Rfl:     gabapentin (NEURONTIN) 300 MG capsule, Take 1 capsule every day by oral route at bedtime for 30 days., Disp: , Rfl:     hydroCHLOROthiazide (HYDRODIURIL) 12.5 MG tablet, Take 1 tablet by mouth Daily., Disp: 30 tablet, Rfl: 5    Insulin Glargine, 1 Unit Dial, (Toujeo SoloStar) 300 UNIT/ML solution pen-injector injection, Inject 60 Units under the skin into the appropriate area as directed 2 (Two) Times a Day for 180 days., Disp: 36 mL, Rfl: 1    Insulin Lispro, 1 Unit Dial, (HumaLOG KwikPen) 100 UNIT/ML solution pen-injector, Per sliding scale tid with meals. Max daily dose of 30 units, Disp: 30 mL, Rfl: 1    iron polysaccharides (Ferrex 150) 150 MG capsule, Take 1 capsule by mouth Daily., Disp: 90 capsule, Rfl: 1    Lancets (freestyle) lancets, 1 each by Other route Daily for 90 days. Use as instructed, Disp: 100 each, Rfl: 5    [START ON 2/27/2024] levoFLOXacin (LEVAQUIN) 500 MG tablet, Take 1 tablet by mouth Daily for 7 days. To start on Tuesday 2/27/24 prior to procedure, Disp: 7 tablet, Rfl: 0    levothyroxine (SYNTHROID, LEVOTHROID) 25 MCG tablet, Take 1 tablet by mouth Daily., Disp: 30 tablet, Rfl: 5    montelukast (SINGULAIR) 10 MG tablet, Take 1 tablet by mouth Every Evening., Disp: 30 tablet, Rfl: 5    ondansetron (Zofran) 4 MG tablet, Take 1 tablet by mouth Every 8 (Eight) Hours As Needed for Nausea or Vomiting., Disp: 30  tablet, Rfl: 0    pantoprazole (PROTONIX) 40 MG EC tablet, Take 1 tablet by mouth Daily., Disp: 30 tablet, Rfl: 5    ProAir RespiClick 108 (90 Base) MCG/ACT inhaler, Inhale 1 puff every 4 (four) hours as needed for wheezing., Disp: 1 g, Rfl: 1    promethazine (PHENERGAN) 12.5 MG tablet, Take 1 tablet by mouth Every 6 (Six) Hours As Needed for Nausea or Vomiting., Disp: 20 tablet, Rfl: 0    saccharomyces boulardii (FLORASTOR) 250 MG capsule, Take 1 capsule by mouth 2 (Two) Times a Day., Disp: , Rfl:     solifenacin (VESICARE) 5 MG tablet, Vesicare 5 mg oral tablet take 1 tablet (5 mg) by oral route once daily   Suspended, Disp: , Rfl:     Tirzepatide (Mounjaro) 10 MG/0.5ML solution pen-injector pen, Inject 0.5 mL under the skin into the appropriate area as directed Every 7 (Seven) Days., Disp: 2 mL, Rfl: 0    topiramate (TOPAMAX) 50 MG tablet, , Disp: , Rfl:     traZODone (DESYREL) 50 MG tablet, Take 1 tablet by mouth Every Night., Disp: 30 tablet, Rfl: 5    vitamin D (ERGOCALCIFEROL) 1.25 MG (99395 UT) capsule capsule, Take 1 capsule by mouth 1 (One) Time Per Week., Disp: 5 capsule, Rfl: 5    Vitamin E 90 MG (200 UNIT) capsule, Take 1 capsule by mouth Daily., Disp: 30 capsule, Rfl: 5    Objective     Vitals:    02/22/24 1054   BP: 112/80   BP Location: Right arm   Patient Position: Sitting   Cuff Size: Large Adult   Pulse: 95   SpO2: 95%   Weight: 96.8 kg (213 lb 6.4 oz)     Body mass index is 41.68 kg/m².    Physical Exam  Constitutional:       Appearance: Normal appearance. She is normal weight. She is obese.      Comments: Severe Obesity (BMI >= 40) Pt Current BMI = 41.68     HENT:      Head: Normocephalic and atraumatic.      Right Ear: External ear normal.      Left Ear: External ear normal.      Nose: Nose normal.   Eyes:      Extraocular Movements: Extraocular movements intact.      Conjunctiva/sclera: Conjunctivae normal.   Pulmonary:      Effort: Pulmonary effort is normal.   Musculoskeletal:          General: Normal range of motion.      Cervical back: Normal range of motion.   Skin:     General: Skin is warm and dry.   Neurological:      General: No focal deficit present.      Mental Status: She is alert and oriented to person, place, and time. Mental status is at baseline.   Psychiatric:         Mood and Affect: Mood normal.         Behavior: Behavior normal.         Thought Content: Thought content normal.         Judgment: Judgment normal.             Result Review :   The following data was reviewed by: RAHEEM Garcia on 02/22/2024:    Most Recent A1C          1/23/2024    09:56   HGBA1C Most Recent   Hemoglobin A1C 9.7        A1C Last 3 Results          10/6/2023    16:23 11/15/2023    09:28 1/23/2024    09:56   HGBA1C Last 3 Results   Hemoglobin A1C 12.5  11.20  9.7      A1c collected on 1/23/2024  is 9.7%, indicating Uncontrolled Type II diabetes.  This result is down from the prior result of 11.2% collected on 11/15/2023    Glucose   Date Value Ref Range Status   02/22/2024 266 (H) 70 - 99 mg/dL Final     Comment:     Serial Number: [id number]:  [id number]   09/22/2023 394 (H) 70 - 110 mg/dL Final     Point of care glucose in the office today is  elevated at 266 mg/dL.    Creatinine   Date Value Ref Range Status   11/15/2023 0.62 0.57 - 1.00 mg/dL Final   05/15/2023 0.60 0.57 - 1.00 mg/dL Final     eGFR   Date Value Ref Range Status   11/15/2023 106.0 >60.0 mL/min/1.73 Final   05/15/2023 107.5 >60.0 mL/min/1.73 Final     Labs collected on 11/15/2023 show Normal values    Microalbumin, Urine   Date Value Ref Range Status   11/15/2023 <1.2 mg/dL Final   05/15/2023 2.1 mg/dL Final     Creatinine, Urine   Date Value Ref Range Status   03/29/2022 99.0 mg/dL Final     Microalbumin/Creatinine Ratio   Date Value Ref Range Status   03/29/2022 44.4 mg/g Final   12/14/2020 15.2 0.0 - 35.0 mg/g[Cre] Final     Urine microalbuminuria collected on 1/15/2023 is negative for microalbuminuria    Total  Cholesterol   Date Value Ref Range Status   11/15/2023 251 (H) 0 - 200 mg/dL Final   05/15/2023 266 (H) 0 - 200 mg/dL Final     Triglycerides   Date Value Ref Range Status   11/15/2023 530 (H) 0 - 150 mg/dL Final   05/15/2023 552 (H) 0 - 150 mg/dL Final     HDL Cholesterol   Date Value Ref Range Status   11/15/2023 34 (L) 40 - 60 mg/dL Final   05/15/2023 39 (L) 40 - 60 mg/dL Final     LDL Cholesterol    Date Value Ref Range Status   11/15/2023 122 (H) 0 - 100 mg/dL Final   05/15/2023 127 (H) 0 - 100 mg/dL Final     Lipid panel collected on 11/15/2023 shows Hyperlipidemia, Hypercholesterolemia, Hypertriglyceridemia, and low HDL            Assessment: Pt is here today for a 1 month follow up on her diabetes.States her glucose levels have improved with switching the toujeo from 116 units once day to 60 units bid and adding the Humalog sliding scale. States adding the Humalog has helped postprandial hyperglycemia. States she is tolerating the Mounjaro w/o any side effects. She has not been on her sensor for about a wk as she needs refills.  States she has been checking her glucose levels 3 times daily and her fasting blood sugars running 139-264 and nonfasting 140-269.  States she has started following a gluten-free diet. Reviewed CGM report with pt in the office today.The 14-day sensor report shows an average glucose of 245mg/dL, with 6% in target range ( mgdL), 52% in the high range (181-250 mg/dL), 42% in the very high range (>250 mg/dL), 0% in the low range (54-70 mg/dL) and 0% in the very low range (<54 mg/dL). Her A1con 1/23/24 was 9.7% which is down from her previous A1c of 11.2% in November.     Diagnoses and all orders for this visit:    1. Uncontrolled type 2 diabetes mellitus with hyperglycemia (Primary)  -     Continuous Blood Gluc Sensor (FreeStyle Harley 3 Sensor) misc; Use 1 each Every 14 (Fourteen) Days.  Dispense: 2 each; Refill: 5  -     Insulin Pen Needle (Pen Needles) 32G X 4 MM misc; Use 1 each  5 (Five) Times a Day.  Dispense: 450 each; Refill: 1    2. Type 2 diabetes mellitus with hyperglycemia, with long-term current use of insulin  -     Tirzepatide (Mounjaro) 10 MG/0.5ML solution pen-injector pen; Inject 0.5 mL under the skin into the appropriate area as directed Every 7 (Seven) Days.  Dispense: 2 mL; Refill: 0  -     Continuous Blood Gluc Sensor (FreeStyle Harley 3 Sensor) misc; Use 1 each Every 14 (Fourteen) Days.  Dispense: 2 each; Refill: 5  -     Insulin Pen Needle (Pen Needles) 32G X 4 MM misc; Use 1 each 5 (Five) Times a Day.  Dispense: 450 each; Refill: 1    3. Severe obesity (BMI >= 40)    4. Uses self-applied continuous glucose monitoring device    Other orders  -     POC Glucose        Plan: Increased her dose of Toujeo from 60 units twice daily to 62 units twice daily.  Instructed patient to increase by 2 units in the a.m. and 2 units in the p.m. every 3 days until fasting blood sugars less than 150 then she is to remain on that dose.  Increased her dose of Mounjaro from 7.5 mg once weekly to 10 mg once weekly.  Instructed patient she can finish her last 2 doses of the 7.5 mg dose before increasing to the 10 mg dose.  Added a carb correction in addition to her sliding scale as follows:  EFORE each meal take Humalog insulin as follows:    5 units for “small meal” (30 - 45 grams of carbohydrate)        (may take ½ dose if eating less than 30 grams)  8 units for “medium meal” (45 - 60 grams of carbohydrate)  10 units for “large meal” (60 - 90 grams of carbohydrate)     Scheduled a 6-week follow-up and we will review her CGM at that time.    The patient will monitor her blood glucose levels per CGM.  If she develops problematic hyperglycemia or hypoglycemia or adverse drug reactions, she will contact the office for further instructions.        Follow Up     Return in about 6 weeks (around 4/4/2024) for CGM Follow Up, Medication Mgmt.    Patient was given instructions and counseling regarding her  condition or for health maintenance advice. Please see specific information pulled into the AVS if appropriate.     Marilee Laughlin, APRN  02/22/2024      Dictated Utilizing Dragon Dictation.  Please note that portions of this note were completed with a voice recognition program.  Part of this note may be an electronic transcription/translation of spoken language to printed text using the Dragon Dictation System.

## 2024-02-22 ENCOUNTER — LAB (OUTPATIENT)
Dept: LAB | Facility: HOSPITAL | Age: 54
End: 2024-02-22
Payer: COMMERCIAL

## 2024-02-22 ENCOUNTER — OFFICE VISIT (OUTPATIENT)
Dept: DIABETES SERVICES | Facility: HOSPITAL | Age: 54
End: 2024-02-22
Payer: COMMERCIAL

## 2024-02-22 VITALS
HEART RATE: 95 BPM | OXYGEN SATURATION: 95 % | BODY MASS INDEX: 41.68 KG/M2 | WEIGHT: 213.4 LBS | DIASTOLIC BLOOD PRESSURE: 80 MMHG | SYSTOLIC BLOOD PRESSURE: 112 MMHG

## 2024-02-22 DIAGNOSIS — E11.65 TYPE 2 DIABETES MELLITUS WITH HYPERGLYCEMIA, WITH LONG-TERM CURRENT USE OF INSULIN: ICD-10-CM

## 2024-02-22 DIAGNOSIS — E66.01 SEVERE OBESITY (BMI >= 40): ICD-10-CM

## 2024-02-22 DIAGNOSIS — Z79.4 TYPE 2 DIABETES MELLITUS WITH HYPERGLYCEMIA, WITH LONG-TERM CURRENT USE OF INSULIN: ICD-10-CM

## 2024-02-22 DIAGNOSIS — N30.00 ACUTE CYSTITIS WITHOUT HEMATURIA: ICD-10-CM

## 2024-02-22 DIAGNOSIS — Z97.8 USES SELF-APPLIED CONTINUOUS GLUCOSE MONITORING DEVICE: ICD-10-CM

## 2024-02-22 DIAGNOSIS — E11.65 UNCONTROLLED TYPE 2 DIABETES MELLITUS WITH HYPERGLYCEMIA: Primary | ICD-10-CM

## 2024-02-22 DIAGNOSIS — E11.65 TYPE 2 DIABETES MELLITUS WITH HYPERGLYCEMIA, UNSPECIFIED WHETHER LONG TERM INSULIN USE: ICD-10-CM

## 2024-02-22 LAB — GLUCOSE BLDC GLUCOMTR-MCNC: 266 MG/DL (ref 70–99)

## 2024-02-22 PROCEDURE — 3074F SYST BP LT 130 MM HG: CPT | Performed by: NURSE PRACTITIONER

## 2024-02-22 PROCEDURE — 99214 OFFICE O/P EST MOD 30 MIN: CPT | Performed by: NURSE PRACTITIONER

## 2024-02-22 PROCEDURE — 82948 REAGENT STRIP/BLOOD GLUCOSE: CPT | Performed by: NURSE PRACTITIONER

## 2024-02-22 PROCEDURE — 87086 URINE CULTURE/COLONY COUNT: CPT

## 2024-02-22 PROCEDURE — 1159F MED LIST DOCD IN RCRD: CPT | Performed by: NURSE PRACTITIONER

## 2024-02-22 PROCEDURE — G0463 HOSPITAL OUTPT CLINIC VISIT: HCPCS | Performed by: NURSE PRACTITIONER

## 2024-02-22 PROCEDURE — 1160F RVW MEDS BY RX/DR IN RCRD: CPT | Performed by: NURSE PRACTITIONER

## 2024-02-22 PROCEDURE — 95251 CONT GLUC MNTR ANALYSIS I&R: CPT | Performed by: NURSE PRACTITIONER

## 2024-02-22 PROCEDURE — 3046F HEMOGLOBIN A1C LEVEL >9.0%: CPT | Performed by: NURSE PRACTITIONER

## 2024-02-22 PROCEDURE — 3079F DIAST BP 80-89 MM HG: CPT | Performed by: NURSE PRACTITIONER

## 2024-02-22 RX ORDER — LISINOPRIL 5 MG/1
5 TABLET ORAL DAILY
COMMUNITY
Start: 2024-02-03

## 2024-02-22 RX ORDER — PEN NEEDLE, DIABETIC 30 GX3/16"
1 NEEDLE, DISPOSABLE MISCELLANEOUS
Qty: 450 EACH | Refills: 1 | Status: SHIPPED | OUTPATIENT
Start: 2024-02-22

## 2024-02-22 RX ORDER — BLOOD-GLUCOSE SENSOR
1 EACH MISCELLANEOUS
Qty: 2 EACH | Refills: 5 | Status: SHIPPED | OUTPATIENT
Start: 2024-02-22

## 2024-02-22 RX ORDER — TIRZEPATIDE 10 MG/.5ML
10 INJECTION, SOLUTION SUBCUTANEOUS
Qty: 2 ML | Refills: 0 | Status: SHIPPED | OUTPATIENT
Start: 2024-02-22

## 2024-02-22 NOTE — PATIENT INSTRUCTIONS
Increase your Toujeo from 60 units twice daily to 62 units twice daily.  I want you to increase by 2 units in the a.m. and 2 units in the p.m. every 3 days until your fasting blood sugars less than 150 then you remain on that dose.  For example tonight you will start with 62 units if in 3 days your fasting blood sugars remaining above 150 before eating or drinking then you will increase to 64 units in the morning and 64 units at night continue tapering up by 2 units in the a.m. and 2 units p.m. every 3 days until your fasting blood sugars less than 150 then remain on that dose.    Increase your dose of Mounjaro from 7.5 mg once weekly to 10 mg once weekly.  You can finish her last 2 doses of 7.5 mg dose before increasing to 10 mg dose.    BEFORE each meal take Humalog insulin as follows:    5 units for “small meal” (30 - 45 grams of carbohydrate)        (may take ½ dose if eating less than 30 grams)  8 units for “medium meal” (45 - 60 grams of carbohydrate)  10 units for “large meal” (60 - 90 grams of carbohydrate)     Take Humalog insulin to correct glucose levels over 150 mg/dl as follows:    (Add to mealtime insulin if ordered)    If blood glucose is less than 150 mg/dl - 0 units  If blood glucose is between 151 and 175 - 2 units  If blood glucose is between 176 and 200 - 3 units  If blood glucose is between 201 and 225 - 4 units  If blood glucose is between 226 and 250 - 5 units  If blood glucose is between 251 and 275 - 6 units  If blood glucose is between 276 and 300 - 7 units  If blood glucose is between 301 and 325 - 8 units  If blood glucose is between 326 and 350 - 9 units  If blood glucose is 351 or above - 10 units

## 2024-02-23 DIAGNOSIS — R92.8 ABNORMAL MAMMOGRAM: Primary | ICD-10-CM

## 2024-02-24 LAB — BACTERIA SPEC AEROBE CULT: NO GROWTH

## 2024-03-01 ENCOUNTER — PROCEDURE VISIT (OUTPATIENT)
Dept: UROLOGY | Facility: CLINIC | Age: 54
End: 2024-03-01
Payer: COMMERCIAL

## 2024-03-01 VITALS
BODY MASS INDEX: 42.21 KG/M2 | WEIGHT: 215 LBS | HEIGHT: 60 IN | DIASTOLIC BLOOD PRESSURE: 85 MMHG | SYSTOLIC BLOOD PRESSURE: 146 MMHG

## 2024-03-01 DIAGNOSIS — N32.81 OAB (OVERACTIVE BLADDER): Primary | ICD-10-CM

## 2024-03-01 LAB
BILIRUB BLD-MCNC: NEGATIVE MG/DL
CLARITY, POC: CLEAR
COLOR UR: YELLOW
EXPIRATION DATE: ABNORMAL
GLUCOSE UR STRIP-MCNC: ABNORMAL MG/DL
KETONES UR QL: NEGATIVE
LEUKOCYTE EST, POC: NEGATIVE
Lab: ABNORMAL
NITRITE UR-MCNC: NEGATIVE MG/ML
PH UR: 7 [PH] (ref 5–8)
PROT UR STRIP-MCNC: NEGATIVE MG/DL
RBC # UR STRIP: NEGATIVE /UL
SP GR UR: 1.01 (ref 1–1.03)
UROBILINOGEN UR QL: ABNORMAL

## 2024-03-01 NOTE — PROGRESS NOTES
Bladder Botox    Date/Time: 3/1/2024 12:40 PM    Performed by: Dyan Yuan MD  Authorized by: Dyan Yuan MD  Preparation: Patient was prepped and draped in the usual sterile fashion.  Local anesthesia used: no    Anesthesia:  Local anesthesia used: no    Sedation:  Patient sedated: no    Patient tolerance: patient tolerated the procedure well with no immediate complications  Comments: Dx:  OAB    Lane catheter inserted and 50cc of lidocaine was instilled and allowed to remain for 20 minutes.  Lane catheter was removed. The flexible cystoscope was inserted. Bladder was inspected and no abnormalities seen. 20 injection sites on the posterior bladder wall were injected with 0.5 cc of botox for a total of 100 units. No complications. The cystoscope was removed. The patient tolerated the procedure well with no bleeding.

## 2024-03-06 RX ORDER — LISINOPRIL 5 MG/1
5 TABLET ORAL 2 TIMES DAILY
Qty: 90 TABLET | Refills: 0 | OUTPATIENT
Start: 2024-03-06

## 2024-03-11 ENCOUNTER — HOSPITAL ENCOUNTER (OUTPATIENT)
Dept: MAMMOGRAPHY | Facility: HOSPITAL | Age: 54
Discharge: HOME | End: 2024-03-11
Payer: COMMERCIAL

## 2024-03-11 ENCOUNTER — HOSPITAL ENCOUNTER (OUTPATIENT)
Dept: ULTRASOUND IMAGING | Facility: HOSPITAL | Age: 54
Discharge: HOME | End: 2024-03-11
Payer: COMMERCIAL

## 2024-03-11 DIAGNOSIS — R92.8 ABNORMAL MAMMOGRAM: ICD-10-CM

## 2024-03-11 PROCEDURE — G0279 TOMOSYNTHESIS, MAMMO: HCPCS

## 2024-03-11 PROCEDURE — 77065 DX MAMMO INCL CAD UNI: CPT

## 2024-03-12 ENCOUNTER — TELEPHONE (OUTPATIENT)
Dept: DIABETES SERVICES | Facility: HOSPITAL | Age: 54
End: 2024-03-12
Payer: COMMERCIAL

## 2024-03-12 DIAGNOSIS — E11.65 UNCONTROLLED TYPE 2 DIABETES MELLITUS WITH HYPERGLYCEMIA: Primary | ICD-10-CM

## 2024-03-12 NOTE — TELEPHONE ENCOUNTER
Pt called to let us know that her pharmacy did not have the mounjaro 10 mg in. I asked if she has called other pharmacies. She stated where she is, but not in [facility]. Pt stated she is going to call other pharmacies in [facility] to see if pharmacies here have any and call us back if we need to transfer it to a new pharmacy.

## 2024-03-13 NOTE — TELEPHONE ENCOUNTER
Patient called into clinic and stated she tried [facility], [facility], [facility] and a different [facility] and they are all out of this prescription.

## 2024-03-13 NOTE — TELEPHONE ENCOUNTER
Spoke with patient who stated she has not tried 10mg, Spoke with provider who would like her to look for the 10mg or the 7.5 mg at [facility] first.

## 2024-03-14 NOTE — TELEPHONE ENCOUNTER
Spoke with [facility] pharmacy who stated that they are out of this dosage and it is still on backorder.

## 2024-03-15 DIAGNOSIS — E11.65 TYPE 2 DIABETES MELLITUS WITH HYPERGLYCEMIA, WITH LONG-TERM CURRENT USE OF INSULIN: ICD-10-CM

## 2024-03-15 DIAGNOSIS — Z79.4 TYPE 2 DIABETES MELLITUS WITH HYPERGLYCEMIA, WITH LONG-TERM CURRENT USE OF INSULIN: ICD-10-CM

## 2024-03-15 RX ORDER — TIRZEPATIDE 10 MG/.5ML
10 INJECTION, SOLUTION SUBCUTANEOUS
Qty: 2 ML | Refills: 0 | Status: SHIPPED | OUTPATIENT
Start: 2024-03-15

## 2024-03-15 NOTE — TELEPHONE ENCOUNTER
Found the 10mg dose of Mounjaro at the [facility] in [place]-they will have it there on Monday and have put her name on it

## 2024-03-15 NOTE — TELEPHONE ENCOUNTER
Spoke with patient, patient would like to stay on mounjaro and not switch, asked patient if she was able to locate any, patient stated only able to find the 12.5mg dose.

## 2024-04-04 DIAGNOSIS — Z79.4 TYPE 2 DIABETES MELLITUS WITH HYPERGLYCEMIA, WITH LONG-TERM CURRENT USE OF INSULIN: ICD-10-CM

## 2024-04-04 DIAGNOSIS — E11.65 TYPE 2 DIABETES MELLITUS WITH HYPERGLYCEMIA, WITH LONG-TERM CURRENT USE OF INSULIN: ICD-10-CM

## 2024-04-04 RX ORDER — TIRZEPATIDE 10 MG/.5ML
10 INJECTION, SOLUTION SUBCUTANEOUS
Qty: 2 ML | Refills: 0 | Status: SHIPPED | OUTPATIENT
Start: 2024-04-04

## 2024-04-08 RX ORDER — LOSARTAN POTASSIUM 25 MG/1
25 TABLET ORAL 2 TIMES DAILY
Qty: 60 TABLET | Refills: 0 | Status: SHIPPED | OUTPATIENT
Start: 2024-04-08

## 2024-04-11 ENCOUNTER — OFFICE VISIT (OUTPATIENT)
Dept: DIABETES SERVICES | Facility: HOSPITAL | Age: 54
End: 2024-04-11
Payer: COMMERCIAL

## 2024-04-11 VITALS
OXYGEN SATURATION: 97 % | SYSTOLIC BLOOD PRESSURE: 135 MMHG | DIASTOLIC BLOOD PRESSURE: 83 MMHG | WEIGHT: 215.6 LBS | BODY MASS INDEX: 42.33 KG/M2 | HEART RATE: 92 BPM | HEIGHT: 60 IN | TEMPERATURE: 98.6 F

## 2024-04-11 DIAGNOSIS — E11.65 TYPE 2 DIABETES MELLITUS WITH HYPERGLYCEMIA, WITH LONG-TERM CURRENT USE OF INSULIN: Primary | ICD-10-CM

## 2024-04-11 DIAGNOSIS — E66.01 SEVERE OBESITY (BMI >= 40): ICD-10-CM

## 2024-04-11 DIAGNOSIS — Z97.8 USES SELF-APPLIED CONTINUOUS GLUCOSE MONITORING DEVICE: ICD-10-CM

## 2024-04-11 DIAGNOSIS — E11.65 TYPE 2 DIABETES MELLITUS WITH HYPERGLYCEMIA, UNSPECIFIED WHETHER LONG TERM INSULIN USE: ICD-10-CM

## 2024-04-11 DIAGNOSIS — Z79.4 TYPE 2 DIABETES MELLITUS WITH HYPERGLYCEMIA, WITH LONG-TERM CURRENT USE OF INSULIN: Primary | ICD-10-CM

## 2024-04-11 DIAGNOSIS — E11.65 UNCONTROLLED TYPE 2 DIABETES MELLITUS WITH HYPERGLYCEMIA: ICD-10-CM

## 2024-04-11 LAB
EXPIRATION DATE: ABNORMAL
GLUCOSE BLDC GLUCOMTR-MCNC: 212 MG/DL (ref 70–99)
HBA1C MFR BLD: 9 % (ref 4.5–5.7)
Lab: ABNORMAL

## 2024-04-11 PROCEDURE — 3079F DIAST BP 80-89 MM HG: CPT | Performed by: NURSE PRACTITIONER

## 2024-04-11 PROCEDURE — 99214 OFFICE O/P EST MOD 30 MIN: CPT | Performed by: NURSE PRACTITIONER

## 2024-04-11 PROCEDURE — 1160F RVW MEDS BY RX/DR IN RCRD: CPT | Performed by: NURSE PRACTITIONER

## 2024-04-11 PROCEDURE — 82948 REAGENT STRIP/BLOOD GLUCOSE: CPT | Performed by: NURSE PRACTITIONER

## 2024-04-11 PROCEDURE — 1159F MED LIST DOCD IN RCRD: CPT | Performed by: NURSE PRACTITIONER

## 2024-04-11 PROCEDURE — 3052F HG A1C>EQUAL 8.0%<EQUAL 9.0%: CPT | Performed by: NURSE PRACTITIONER

## 2024-04-11 PROCEDURE — 3075F SYST BP GE 130 - 139MM HG: CPT | Performed by: NURSE PRACTITIONER

## 2024-04-11 PROCEDURE — 95251 CONT GLUC MNTR ANALYSIS I&R: CPT | Performed by: NURSE PRACTITIONER

## 2024-04-11 PROCEDURE — G0463 HOSPITAL OUTPT CLINIC VISIT: HCPCS | Performed by: NURSE PRACTITIONER

## 2024-04-11 RX ORDER — INSULIN GLARGINE 300 U/ML
70 INJECTION, SOLUTION SUBCUTANEOUS 2 TIMES DAILY
Qty: 42 ML | Refills: 1 | Status: SHIPPED | OUTPATIENT
Start: 2024-04-11 | End: 2024-10-08

## 2024-04-11 RX ORDER — INSULIN LISPRO 100 [IU]/ML
INJECTION, SOLUTION INTRAVENOUS; SUBCUTANEOUS
Qty: 35 ML | Refills: 1 | Status: SHIPPED | OUTPATIENT
Start: 2024-04-11

## 2024-04-11 RX ORDER — PEN NEEDLE, DIABETIC 30 GX3/16"
1 NEEDLE, DISPOSABLE MISCELLANEOUS
Qty: 450 EACH | Refills: 1 | Status: SHIPPED | OUTPATIENT
Start: 2024-04-11

## 2024-04-11 NOTE — PROGRESS NOTES
Chief Complaint  Follow-up (CGM, Med Mgmt)    Referred By: DONNELL Burgos    Subjective          Carlota MONTOYA [place] presents to [facility] for diabetes medication management    History of Present Illness    Visit type:  follow-up  Diabetes type:  Type 2  Current diabetes status/concerns/issues: States she tapered up her Toujeo to 68 units bid. States her fbs was running 145-155mg/dl until the last few days. States the last few days her FBS has been running above 200mg/dl. She is tolerating the increased dose of Mounjaro well w/o any side effects. States adding the carb correction in addition to the sliding scale has really helped. States she did not take her Moujaro on Sunday because she forgot to take the top off and injected it so that pen was wasted. Her pharmacy would not refill her Mounjaro early. She has picked up the refill so she will take her next injection on Sunday.      Other health concerns: No new health concerns  Current Diabetes symptoms:    Polyuria: No   Polydipsia: No   Polyphagia: Yes     Blurred vision: Yes     Excessive fatigue: Yes    Known Diabetes complications:  Neuropathy: None; Location: N/A  Renal: None  Eyes: None; Location: N/A; Last Eye Exam:  2022 ; Location: [facility]  Amputation/Wounds: None  GI: Reflux  Cardiovascular: Hypertension and Hyperlipidemia  ED: N/A  Other: None  Hypoglycemia:  None reported at this time and 0% per CGM  Hypoglycemia Symptoms:  No hypoglycemia at this time  Current diabetes treatment:  Mounjaro 10 mg weekly, Toujeo 68 units bid, Humalog per sliding scale for glucose over 150mg/dl plus carb correction  Blood glucose device:  Vizimax CGM  Blood glucose monitoring frequency:  Continuous per CGM  Blood glucose range/average:  The 14-day sensor report shows an average glucose of 196 mg/dL, with 42% in target range ( mgdL), 42% in the high range (181-250 mg/dL), 16% in the very high range (>250 mg/dL), 0% in the low  range (54-70 mg/dL) and 0% in the very low range (<54 mg/dL).   Glucose Source: Device Reviewed  Dietary behavior:  Avoids sugary drinks, Number of meals each day - 3; Number of snacks each day - 1-2, she eliminated sugar from her diet and decreased her carb intake.   Activity/Exercise:  she has been able to be more active around the house. She is going to start exercising.    Past Medical History:   Diagnosis Date    Acne     Allergic rhinitis due to allergen 04/15/2021    Allergies     Anemia 2020    Anesthesia     HARD TO WAKE UP POSTOP    Anxiety 04/15/2021    Asthma     INHALERS PRN    Bundle branch block, right     SEE'S DR BENNETT IN 2019, NOW SEES ON AS NEEDED BASIS, MAINLY FOLLOW PCP ROUTINELY. DENIED CP/SOB    Depression     Diabetes     Diabetes mellitus, type 2 2020    DOESN'T CHECK BG AT HOME    Essential hypertension 2020    Fatigue 04/15/2021    Fibromyalgia     GERD (gastroesophageal reflux disease) 2020    High cholesterol     Hyperlipidemia 2020    Hypothyroidism 2020    Inguinal hernia     Insomnia     Macromastia     Migraine     Obesity 2020    DANY (obstructive sleep apnea) 2020    Palpitation     NO PROBLEMS CURRENTLY. FOLLOWS PCP    Right bundle branch block 2020    SEE'S DR BLUE ON AS NEEDED BASIS, SEE'S PCP ON YEARLY. DENIED CP/SOB    RLS (restless legs syndrome)     Seasonal allergies     Sinus trouble     Stomach pain 2016    Stress 2016    Thyroid disease     Urinary incontinence     Vitamin B 12 deficiency     Vitamin D deficiency 2020     Past Surgical History:   Procedure Laterality Date    BILATERAL BREAST REDUCTION Bilateral 2022    Procedure: BREAST REDUCTION;  Surgeon: Zeeshan Bone MD;  Location: [facility] OR [facility];  Service: Plastics;  Laterality: Bilateral;    BREAST BIOPSY Right      SECTION      CHOLECYSTECTOMY      COLONOSCOPY      CYSTOSCOPY BOTOX INJECTION OF BLADDER  2019    ENDOSCOPY       HERNIA REPAIR      HYSTERECTOMY  05/2017    WISDOM TOOTH EXTRACTION       Family History   Problem Relation Age of Onset    Stroke Mother     Diabetes Mother     Restless legs syndrome Mother     Nephrolithiasis Father     Sleep apnea Father     Restless legs syndrome Sister     Colon cancer Maternal Grandmother         50S    Colon cancer Paternal Grandfather         70S    Diabetes Maternal Uncle     Breast cancer Other         20S    Diabetes Other      Social History     Socioeconomic History    Marital status:    Tobacco Use    Smoking status: Never     Passive exposure: Never    Smokeless tobacco: Never   Vaping Use    Vaping status: Never Used   Substance and Sexual Activity    Alcohol use: Never    Drug use: Never    Sexual activity: Defer     Allergies   Allergen Reactions    Pravastatin Sodium Nausea Only    Hydrocodone-Acetaminophen Hallucinations     OK TO TAKE REGULAR TYLENOL     Lisinopril Cough    Pollen Extract Other (See Comments)     CONGESTION, WATERY EYES, SNEEZING        Current Outpatient Medications:     amLODIPine (NORVASC) 10 MG tablet, Take 1 tablet by mouth Daily., Disp: 30 tablet, Rfl: 5    ARIPiprazole (ABILIFY) 5 MG tablet, Take 1.5 tablets by mouth Daily., Disp: 45 tablet, Rfl: 5    Blood Glucose Monitoring Suppl (FreeStyle Lite) w/Device kit, , Disp: , Rfl:     buPROPion XL (WELLBUTRIN XL) 150 MG 24 hr tablet, Take 1 tablet by mouth Daily., Disp: , Rfl:     Calcium Carbonate 1500 (600 Ca) MG tablet, TAKE ONE TABLET BY MOUTH ONCE DAILY, Disp: 30 tablet, Rfl: 4    cetirizine (zyrTEC) 10 MG tablet, Take 1 tablet by mouth Daily., Disp: , Rfl:     cimetidine (TAGAMET) 200 MG tablet, Take 1 tablet by mouth 2 (Two) Times a Day Before Meals., Disp: 60 tablet, Rfl: 5    Continuous Blood Gluc Sensor (FreeStyle Harley 3 Sensor) misc, Use 1 each Every 14 (Fourteen) Days., Disp: 2 each, Rfl: 5    cyanocobalamin (VITAMIN B-12) 1000 MCG tablet, Vitamin B-12 1,000 mcg oral tablet take 1 tablet  by oral route daily   Active, Disp: , Rfl:     DULoxetine (CYMBALTA) 60 MG capsule, Take 1 capsule by mouth Daily., Disp: 30 capsule, Rfl: 5    ezetimibe (Zetia) 10 MG tablet, Take 1 tablet by mouth Daily., Disp: 30 tablet, Rfl: 5    FREESTYLE LITE test strip, 1 each by Other route Daily for 180 days. Use as instructed, Disp: 90 each, Rfl: 1    gabapentin (NEURONTIN) 100 MG capsule, Take 1 capsule twice a day by oral route for 30 days., Disp: , Rfl:     gabapentin (NEURONTIN) 300 MG capsule, Take 1 capsule every day by oral route at bedtime for 30 days., Disp: , Rfl:     hydroCHLOROthiazide (HYDRODIURIL) 12.5 MG tablet, Take 1 tablet by mouth Daily., Disp: 30 tablet, Rfl: 5    Insulin Glargine, 1 Unit Dial, (Toujeo SoloStar) 300 UNIT/ML solution pen-injector injection, Inject 70 Units under the skin into the appropriate area as directed 2 (Two) Times a Day for 180 days., Disp: 42 mL, Rfl: 1    Insulin Lispro, 1 Unit Dial, (HumaLOG KwikPen) 100 UNIT/ML solution pen-injector, Per sliding scale tid with meals. Max daily dose of 40 units, Disp: 35 mL, Rfl: 1    Insulin Pen Needle (Pen Needles) 32G X 4 MM misc, Use 1 each 5 (Five) Times a Day., Disp: 450 each, Rfl: 1    iron polysaccharides (Ferrex 150) 150 MG capsule, Take 1 capsule by mouth Daily., Disp: 90 capsule, Rfl: 1    levothyroxine (SYNTHROID, LEVOTHROID) 25 MCG tablet, Take 1 tablet by mouth Daily., Disp: 30 tablet, Rfl: 5    lisinopril (PRINIVIL,ZESTRIL) 5 MG tablet, Take 1 tablet by mouth Daily., Disp: , Rfl:     losartan (COZAAR) 25 MG tablet, Take 1 tablet by mouth 2 (two) times a day., Disp: 60 tablet, Rfl: 0    montelukast (SINGULAIR) 10 MG tablet, Take 1 tablet by mouth Every Evening., Disp: 30 tablet, Rfl: 5    ondansetron (Zofran) 4 MG tablet, Take 1 tablet by mouth Every 8 (Eight) Hours As Needed for Nausea or Vomiting., Disp: 30 tablet, Rfl: 0    pantoprazole (PROTONIX) 40 MG EC tablet, Take 1 tablet by mouth Daily., Disp: 30 tablet, Rfl: 5     "ProAir RespiClick 108 (90 Base) MCG/ACT inhaler, Inhale 1 puff every 4 (four) hours as needed for wheezing., Disp: 1 g, Rfl: 1    promethazine (PHENERGAN) 12.5 MG tablet, Take 1 tablet by mouth Every 6 (Six) Hours As Needed for Nausea or Vomiting., Disp: 20 tablet, Rfl: 0    saccharomyces boulardii (FLORASTOR) 250 MG capsule, Take 1 capsule by mouth 2 (Two) Times a Day., Disp: , Rfl:     solifenacin (VESICARE) 5 MG tablet, Vesicare 5 mg oral tablet take 1 tablet (5 mg) by oral route once daily   Suspended, Disp: , Rfl:     topiramate (TOPAMAX) 50 MG tablet, , Disp: , Rfl:     traZODone (DESYREL) 50 MG tablet, Take 1 tablet by mouth Every Night., Disp: 30 tablet, Rfl: 5    vitamin D (ERGOCALCIFEROL) 1.25 MG (07511 UT) capsule capsule, Take 1 capsule by mouth 1 (One) Time Per Week., Disp: 5 capsule, Rfl: 5    Vitamin E 90 MG (200 UNIT) capsule, Take 1 capsule by mouth Daily., Disp: 30 capsule, Rfl: 5    Tirzepatide (Mounjaro) 12.5 MG/0.5ML solution pen-injector pen, Inject 0.5 mL under the skin into the appropriate area as directed Every 7 (Seven) Days., Disp: 2 mL, Rfl: 0    Objective     Vitals:    04/11/24 1035   BP: 135/83   BP Location: Left arm   Patient Position: Sitting   Cuff Size: Adult   Pulse: 92   Temp: 98.6 °F (37 °C)   TempSrc: Temporal   SpO2: 97%   Weight: 97.8 kg (215 lb 9.6 oz)   Height: 152.4 cm (60\")     Body mass index is 42.11 kg/m².    Physical Exam  Constitutional:       Appearance: Normal appearance. She is obese.      Comments: Severe Obesity (BMI >= 40) Pt Current BMI = 42.11     HENT:      Head: Normocephalic and atraumatic.      Right Ear: External ear normal.      Left Ear: External ear normal.      Nose: Nose normal.   Eyes:      Extraocular Movements: Extraocular movements intact.      Conjunctiva/sclera: Conjunctivae normal.   Pulmonary:      Effort: Pulmonary effort is normal.   Musculoskeletal:         General: Normal range of motion.      Cervical back: Normal range of motion. "   Skin:     General: Skin is warm and dry.   Neurological:      General: No focal deficit present.      Mental Status: She is alert and oriented to person, place, and time. Mental status is at baseline.   Psychiatric:         Mood and Affect: Mood normal.         Behavior: Behavior normal.         Thought Content: Thought content normal.         Judgment: Judgment normal.             Result Review :   The following data was reviewed by: RAHEEM Garcia on 04/11/2024:    Most Recent A1C          4/11/2024    10:53   HGBA1C Most Recent   Hemoglobin A1C 9.0        A1C Last 3 Results          11/15/2023    09:28 1/23/2024    09:56 4/11/2024    10:53   HGBA1C Last 3 Results   Hemoglobin A1C 11.20  9.7  9.0      A1c collected in the office today is 9%, indicating Uncontrolled Type II diabetes.  This result is down from the prior result of 9.7% collected on 1/23/2024    Glucose   Date Value Ref Range Status   04/11/2024 212 (H) 70 - 99 mg/dL Final     Comment:     Serial Number: [id number]:  [id number]   09/22/2023 394 (H) 70 - 110 mg/dL Final     Point of care glucose in the office today is  elevated at 212 mg/dL.    Creatinine   Date Value Ref Range Status   11/15/2023 0.62 0.57 - 1.00 mg/dL Final   05/15/2023 0.60 0.57 - 1.00 mg/dL Final     eGFR   Date Value Ref Range Status   11/15/2023 106.0 >60.0 mL/min/1.73 Final   05/15/2023 107.5 >60.0 mL/min/1.73 Final     Labs collected on 11/15/2023 show Normal values    Microalbumin, Urine   Date Value Ref Range Status   11/15/2023 <1.2 mg/dL Final   05/15/2023 2.1 mg/dL Final     Creatinine, Urine   Date Value Ref Range Status   03/29/2022 99.0 mg/dL Final     Microalbumin/Creatinine Ratio   Date Value Ref Range Status   03/29/2022 44.4 mg/g Final   12/14/2020 15.2 0.0 - 35.0 mg/g[Cre] Final     Urine microalbuminuria collected on 11/15/2023 is negative for microalbuminuria    Total Cholesterol   Date Value Ref Range Status   11/15/2023 251 (H) 0 - 200  mg/dL Final   05/15/2023 266 (H) 0 - 200 mg/dL Final     Triglycerides   Date Value Ref Range Status   11/15/2023 530 (H) 0 - 150 mg/dL Final   05/15/2023 552 (H) 0 - 150 mg/dL Final     HDL Cholesterol   Date Value Ref Range Status   11/15/2023 34 (L) 40 - 60 mg/dL Final   05/15/2023 39 (L) 40 - 60 mg/dL Final     LDL Cholesterol    Date Value Ref Range Status   11/15/2023 122 (H) 0 - 100 mg/dL Final   05/15/2023 127 (H) 0 - 100 mg/dL Final     Lipid panel collected on 11/15/2023 shows Hyperlipidemia, Hypercholesterolemia, Hypertriglyceridemia, and low HDL            Assessment: Patient is here today for 3-month follow-up on her diabetes.  She reports has been really working hard on her diet and exercise.  States she has been walking with weather permitting.  Last visit her Toujeo was increased to 62 units twice daily and she was to taper by 2 units in the morning and 2 units at night until her fasting blood sugar was less than 150 then she was to remain on that dose.  Her Mounjaro was increased from 7.5 mg once weekly to 10 mg once weekly.  Additionally a carb correction was added in addition to a sliding scale. States she tapered up her Toujeo to 68 units bid. States her fbs was running 145-155mg/dl until the last few days. States the last few days her FBS has been running above 200mg/dl. She is tolerating the increased dose of Mounjaro well w/o any side effects. States adding the carb correction in addition to the sliding scale has really helped. States she did not take her Moujaro on Sunday because she forgot to take the top off and injected it so that pen was wasted. Her pharmacy would not refill her Mounjaro early. She has picked up the refill so she will take her next injection on Sunday.  Reviewed CGM report with patient in the office today.The 14-day sensor report shows an average glucose of 196 mg/dL, with 42% in target range ( mgdL), 42% in the high range (181-250 mg/dL), 16% in the very high range  (>250 mg/dL), 0% in the low range (54-70 mg/dL) and 0% in the very low range (<54 mg/dL).  Her A1c in the office today is 9% which is down from her previous A1c of 9.7% in January and 11.2% in November.      Diagnoses and all orders for this visit:    1. Type 2 diabetes mellitus with hyperglycemia, with long-term current use of insulin (Primary)  -     POC Glycosylated Hemoglobin (Hb A1C)  -     Tirzepatide (Mounjaro) 12.5 MG/0.5ML solution pen-injector pen; Inject 0.5 mL under the skin into the appropriate area as directed Every 7 (Seven) Days.  Dispense: 2 mL; Refill: 0  -     Insulin Glargine, 1 Unit Dial, (Toujeo SoloStar) 300 UNIT/ML solution pen-injector injection; Inject 70 Units under the skin into the appropriate area as directed 2 (Two) Times a Day for 180 days.  Dispense: 42 mL; Refill: 1  -     Insulin Pen Needle (Pen Needles) 32G X 4 MM misc; Use 1 each 5 (Five) Times a Day.  Dispense: 450 each; Refill: 1    2. Type 2 diabetes mellitus with hyperglycemia, unspecified whether long term insulin use  -     Insulin Lispro, 1 Unit Dial, (HumaLOG KwikPen) 100 UNIT/ML solution pen-injector; Per sliding scale tid with meals. Max daily dose of 40 units  Dispense: 35 mL; Refill: 1  -     Insulin Glargine, 1 Unit Dial, (Toujeo SoloStar) 300 UNIT/ML solution pen-injector injection; Inject 70 Units under the skin into the appropriate area as directed 2 (Two) Times a Day for 180 days.  Dispense: 42 mL; Refill: 1    3. Uncontrolled type 2 diabetes mellitus with hyperglycemia  -     Insulin Glargine, 1 Unit Dial, (Toujeo SoloStar) 300 UNIT/ML solution pen-injector injection; Inject 70 Units under the skin into the appropriate area as directed 2 (Two) Times a Day for 180 days.  Dispense: 42 mL; Refill: 1  -     Insulin Pen Needle (Pen Needles) 32G X 4 MM misc; Use 1 each 5 (Five) Times a Day.  Dispense: 450 each; Refill: 1    4. Severe obesity (BMI >= 40)    5. Uses self-applied continuous glucose monitoring  device    Other orders  -     POC Glucose        Plan: Increased her dose of Mounjaro from 10 mg once weekly to 12.5 mg once weekly.  Increased her dose of Toujeo from 68 units twice daily to 70 units twice daily.  Instructed patient to make sure to do these injections 12 hours apart.  Scheduled a 6-week follow-up and we will review her CGM at that time.    The patient will monitor her blood glucose levels per CGM.  If she develops problematic hyperglycemia or hypoglycemia or adverse drug reactions, she will contact the office for further instructions.        Follow Up     Return in about 6 weeks (around 5/23/2024) for CGM Follow Up, Medication Mgmt.    Patient was given instructions and counseling regarding her condition or for health maintenance advice. Please see specific information pulled into the AVS if appropriate.     Marilee Laughlin, RAHEEM  04/11/2024      Dictated Utilizing Dragon Dictation.  Please note that portions of this note were completed with a voice recognition program.  Part of this note may be an electronic transcription/translation of spoken language to printed text using the Dragon Dictation System.

## 2024-04-11 NOTE — PATIENT INSTRUCTIONS
Increase your Mounjaro to 12.5 mg once weekly.  Make sure to do this injection on same day of the week.    Increase your Toujeo to 70 units twice daily.  Make sure to do these injections 12 hours apart.

## 2024-04-15 ENCOUNTER — OFFICE VISIT (OUTPATIENT)
Dept: FAMILY MEDICINE CLINIC | Facility: CLINIC | Age: 54
End: 2024-04-15
Payer: COMMERCIAL

## 2024-04-15 VITALS
HEIGHT: 60 IN | DIASTOLIC BLOOD PRESSURE: 80 MMHG | WEIGHT: 213.5 LBS | RESPIRATION RATE: 16 BRPM | SYSTOLIC BLOOD PRESSURE: 130 MMHG | BODY MASS INDEX: 41.91 KG/M2 | OXYGEN SATURATION: 95 % | HEART RATE: 94 BPM | TEMPERATURE: 97.4 F

## 2024-04-15 DIAGNOSIS — M51.36 DDD (DEGENERATIVE DISC DISEASE), LUMBAR: ICD-10-CM

## 2024-04-15 DIAGNOSIS — F50.81 BINGE EATING DISORDER: Primary | ICD-10-CM

## 2024-04-15 DIAGNOSIS — M50.30 DDD (DEGENERATIVE DISC DISEASE), CERVICAL: ICD-10-CM

## 2024-04-15 DIAGNOSIS — R25.1 TREMOR: ICD-10-CM

## 2024-04-15 PROCEDURE — 3052F HG A1C>EQUAL 8.0%<EQUAL 9.0%: CPT | Performed by: NURSE PRACTITIONER

## 2024-04-15 PROCEDURE — 99214 OFFICE O/P EST MOD 30 MIN: CPT | Performed by: NURSE PRACTITIONER

## 2024-04-15 PROCEDURE — 1159F MED LIST DOCD IN RCRD: CPT | Performed by: NURSE PRACTITIONER

## 2024-04-15 PROCEDURE — 3075F SYST BP GE 130 - 139MM HG: CPT | Performed by: NURSE PRACTITIONER

## 2024-04-15 PROCEDURE — 1160F RVW MEDS BY RX/DR IN RCRD: CPT | Performed by: NURSE PRACTITIONER

## 2024-04-15 PROCEDURE — 3079F DIAST BP 80-89 MM HG: CPT | Performed by: NURSE PRACTITIONER

## 2024-04-15 NOTE — PROGRESS NOTES
Chief Complaint  Obesity (Binge eating disorder) and Hypothyroidism    Subjective          Carlota Robin presents to [facility]  History of Present Illness  She is having ankle/ swelling but she has been in the car quite a bit.  She is taking her water pill on a daily basis.  She is having no pain noted today.  She is here to discuss binge eating.  She sees her therapist in person and she was diagnosed with binge eating disorder and her therapist requested that I place her on Vyvanse.  The other option was Adderall.  She is wanting to try these to help her binge eating.  She said that she just does not know when to really stop she will continuously eat and eat until she makes herself feel really sick but she does not vomit.  She is also having issues of her right leg jerking and it just continues to jerk at times.  She said then her whole body will jerk but once she sits down it gets better.  She thought that it was because of the metformin but she has been off the metformin for a while now.  She has not had any bladder issues besides her stress incontinence there is no actual bladder leaking or bowel leaking for no reason.  She has no other issues with her hands at all it is just mainly her lower extremities.  She is very worried of what could be and would like further testing.  She does not have any issues with eating in the sense with utensils.    Depression: At risk (4/15/2024)    PHQ-2     PHQ-2 Score: 12    and 4/15/2024               Allergies  Pravastatin sodium, Hydrocodone-acetaminophen, Lisinopril, and Pollen extract    Social History     Tobacco Use    Smoking status: Never     Passive exposure: Never    Smokeless tobacco: Never   Vaping Use    Vaping status: Never Used   Substance Use Topics    Alcohol use: Never    Drug use: Never       Family History   Problem Relation Age of Onset    Stroke Mother     Diabetes Mother     Restless legs syndrome Mother      "Hyperlipidemia Mother     Nephrolithiasis Father     Sleep apnea Father     Depression Father     Restless legs syndrome Sister     Colon cancer Maternal Grandmother         50S    Colon cancer Paternal Grandfather         70S    Diabetes Maternal Uncle     Breast cancer Other         20S    Diabetes Other         Health Maintenance Due   Topic Date Due    ANNUAL PHYSICAL  Never done    DIABETIC EYE EXAM  04/01/2024        Immunization History   Administered Date(s) Administered    Influenza, Unspecified 11/25/2019    Tdap 11/25/2019       Review of Systems   Constitutional:  Positive for fatigue.   Respiratory:  Negative for cough and shortness of breath.    Cardiovascular:  Positive for leg swelling. Negative for chest pain.   Gastrointestinal:  Negative for diarrhea, nausea and vomiting.   Neurological:  Positive for tremors.        Objective       Vitals:    04/15/24 0816   BP: 130/80   Pulse: 94   Resp: 16   Temp: 97.4 °F (36.3 °C)   SpO2: 95%   Weight: 96.8 kg (213 lb 8 oz)   Height: 152.4 cm (60\")       Body mass index is 41.7 kg/m².         Physical Exam  Vitals reviewed.   Constitutional:       Appearance: Normal appearance. She is well-developed.   Cardiovascular:      Rate and Rhythm: Normal rate and regular rhythm.      Heart sounds: Normal heart sounds. No murmur heard.  Pulmonary:      Effort: Pulmonary effort is normal.      Breath sounds: Normal breath sounds.   Musculoskeletal:      Comments: Trace lower ankle to feet edema bilateral   Neurological:      Mental Status: She is alert and oriented to person, place, and time.      Cranial Nerves: No cranial nerve deficit.      Motor: No weakness.   Psychiatric:         Mood and Affect: Mood and affect normal.               Result Review :     The following data was reviewed by: RAHEEM Burgos on 04/15/2024:    Common Labs   Common labs          11/15/2023    09:28 1/23/2024    09:56 4/11/2024    10:53   Common Labs   Glucose 296      BUN 11  "     Creatinine 0.62      Sodium 138      Potassium 4.6      Chloride 100      Calcium 9.3      Albumin 4.3      Total Bilirubin 0.2      Alkaline Phosphatase 140      AST (SGOT) 37      ALT (SGPT) 40      WBC 6.71      Hemoglobin 14.7      Hematocrit 44.6      Platelets 270      Total Cholesterol 251      Triglycerides 530      HDL Cholesterol 34      LDL Cholesterol  122      Hemoglobin A1C 11.20  9.7  9.0    Microalbumin, Urine <1.2                           Assessment and Plan      Assessment & Plan  Binge eating disorder  We will refer to psychiatry as I am not able to write Vyvanse or Adderall.  We did discuss that we could start with the potential of Prozac but she is already currently on Cymbalta.  We will go ahead and do the referral for psychiatry.  She will continue to see her therapist as scheduled.  Tremor  We will first do xrays then the poss of MRI of the brain and spine if needed.  Also in the process we will think about EMG testing after the xray.      Orders Placed This Encounter   Procedures    XR Spine Cervical Complete 4 or 5 View    XR Spine Thoracic 3 View    XR Spine Lumbar 4+ View    Ambulatory Referral to Psychiatry             Diagnosis Plan   1. Binge eating disorder  Ambulatory Referral to Psychiatry      2. Tremor  XR Spine Cervical Complete 4 or 5 View    XR Spine Thoracic 3 View    XR Spine Lumbar 4+ View                Follow Up     Return if symptoms worsen or fail to improve.    Patient was given instructions and counseling regarding her condition or for health maintenance advice. Please see specific information pulled into the AVS if appropriate.     Parts of this note are electronic transcriptions/translations of spoken language to printed text using the Dragon Dictation system.          Audra Tran, RAHEEM  04/15/2024  Answers submitted by the patient for this visit:  Other (Submitted on 4/11/2024)  Please describe your symptoms.: Diet and thyroid  Have you had these  symptoms before?: Yes  How long have you been having these symptoms?: Greater than 2 weeks  Primary Reason for Visit (Submitted on 4/11/2024)  What is the primary reason for your visit?: Other

## 2024-04-16 ENCOUNTER — HOSPITAL ENCOUNTER (OUTPATIENT)
Dept: GENERAL RADIOLOGY | Facility: HOSPITAL | Age: 54
Discharge: HOME | End: 2024-04-16
Payer: COMMERCIAL

## 2024-04-16 DIAGNOSIS — R25.1 TREMOR: ICD-10-CM

## 2024-04-16 PROCEDURE — 72072 X-RAY EXAM THORAC SPINE 3VWS: CPT

## 2024-04-16 PROCEDURE — 72050 X-RAY EXAM NECK SPINE 4/5VWS: CPT

## 2024-04-16 PROCEDURE — 72110 X-RAY EXAM L-2 SPINE 4/>VWS: CPT

## 2024-04-24 ENCOUNTER — PATIENT ROUNDING (BHMG ONLY) (OUTPATIENT)
Dept: FAMILY MEDICINE CLINIC | Facility: CLINIC | Age: 54
End: 2024-04-24
Payer: COMMERCIAL

## 2024-05-08 ENCOUNTER — TELEPHONE (OUTPATIENT)
Dept: DIABETES SERVICES | Facility: HOSPITAL | Age: 54
End: 2024-05-08
Payer: COMMERCIAL

## 2024-05-08 DIAGNOSIS — Z79.4 TYPE 2 DIABETES MELLITUS WITH HYPERGLYCEMIA, WITH LONG-TERM CURRENT USE OF INSULIN: Primary | ICD-10-CM

## 2024-05-08 DIAGNOSIS — E11.65 TYPE 2 DIABETES MELLITUS WITH HYPERGLYCEMIA, WITH LONG-TERM CURRENT USE OF INSULIN: Primary | ICD-10-CM

## 2024-05-08 RX ORDER — TIRZEPATIDE 10 MG/.5ML
10 INJECTION, SOLUTION SUBCUTANEOUS
Qty: 2 ML | Refills: 5 | Status: SHIPPED | OUTPATIENT
Start: 2024-05-08

## 2024-05-15 ENCOUNTER — OFFICE VISIT (OUTPATIENT)
Dept: FAMILY MEDICINE CLINIC | Facility: CLINIC | Age: 54
End: 2024-05-15
Payer: COMMERCIAL

## 2024-05-15 ENCOUNTER — PATIENT MESSAGE (OUTPATIENT)
Dept: FAMILY MEDICINE CLINIC | Facility: CLINIC | Age: 54
End: 2024-05-15

## 2024-05-15 VITALS
SYSTOLIC BLOOD PRESSURE: 133 MMHG | TEMPERATURE: 97.2 F | OXYGEN SATURATION: 95 % | HEART RATE: 88 BPM | HEIGHT: 60 IN | WEIGHT: 214.4 LBS | DIASTOLIC BLOOD PRESSURE: 84 MMHG | RESPIRATION RATE: 16 BRPM | BODY MASS INDEX: 42.09 KG/M2

## 2024-05-15 DIAGNOSIS — R41.840 CONCENTRATION DEFICIT: ICD-10-CM

## 2024-05-15 DIAGNOSIS — Z00.00 ANNUAL PHYSICAL EXAM: Primary | ICD-10-CM

## 2024-05-15 DIAGNOSIS — I10 PRIMARY HYPERTENSION: ICD-10-CM

## 2024-05-15 DIAGNOSIS — E55.9 VITAMIN D DEFICIENCY: ICD-10-CM

## 2024-05-15 DIAGNOSIS — Z78.0 POSTMENOPAUSE: ICD-10-CM

## 2024-05-15 DIAGNOSIS — E78.5 HYPERLIPIDEMIA, UNSPECIFIED HYPERLIPIDEMIA TYPE: ICD-10-CM

## 2024-05-15 DIAGNOSIS — J30.2 SEASONAL ALLERGIC RHINITIS, UNSPECIFIED TRIGGER: ICD-10-CM

## 2024-05-15 DIAGNOSIS — G47.09 OTHER INSOMNIA: ICD-10-CM

## 2024-05-15 DIAGNOSIS — F41.9 ANXIETY: ICD-10-CM

## 2024-05-15 DIAGNOSIS — E03.9 ACQUIRED HYPOTHYROIDISM: ICD-10-CM

## 2024-05-15 DIAGNOSIS — K21.9 GASTROESOPHAGEAL REFLUX DISEASE WITHOUT ESOPHAGITIS: ICD-10-CM

## 2024-05-15 DIAGNOSIS — M79.7 FIBROMYALGIA: ICD-10-CM

## 2024-05-15 DIAGNOSIS — D50.9 IRON DEFICIENCY ANEMIA, UNSPECIFIED IRON DEFICIENCY ANEMIA TYPE: ICD-10-CM

## 2024-05-15 LAB
25(OH)D3 SERPL-MCNC: 48.4 NG/ML (ref 30–100)
ALBUMIN SERPL-MCNC: 3.9 G/DL (ref 3.5–5.2)
ALBUMIN/GLOB SERPL: 1.4 G/DL
ALP SERPL-CCNC: 126 U/L (ref 39–117)
ALT SERPL W P-5'-P-CCNC: 25 U/L (ref 1–33)
ANION GAP SERPL CALCULATED.3IONS-SCNC: 9.9 MMOL/L (ref 5–15)
AST SERPL-CCNC: 18 U/L (ref 1–32)
BASOPHILS # BLD AUTO: 0.06 10*3/MM3 (ref 0–0.2)
BASOPHILS NFR BLD AUTO: 1 % (ref 0–1.5)
BILIRUB SERPL-MCNC: <0.2 MG/DL (ref 0–1.2)
BUN SERPL-MCNC: 10 MG/DL (ref 6–20)
BUN/CREAT SERPL: 13.9 (ref 7–25)
CALCIUM SPEC-SCNC: 9.3 MG/DL (ref 8.6–10.5)
CHLORIDE SERPL-SCNC: 102 MMOL/L (ref 98–107)
CHOLEST SERPL-MCNC: 208 MG/DL (ref 0–200)
CO2 SERPL-SCNC: 26.1 MMOL/L (ref 22–29)
CREAT SERPL-MCNC: 0.72 MG/DL (ref 0.57–1)
DEPRECATED RDW RBC AUTO: 41.1 FL (ref 37–54)
EGFRCR SERPLBLD CKD-EPI 2021: 99.5 ML/MIN/1.73
EOSINOPHIL # BLD AUTO: 0.25 10*3/MM3 (ref 0–0.4)
EOSINOPHIL NFR BLD AUTO: 4 % (ref 0.3–6.2)
ERYTHROCYTE [DISTWIDTH] IN BLOOD BY AUTOMATED COUNT: 13.4 % (ref 12.3–15.4)
FERRITIN SERPL-MCNC: 156 NG/ML (ref 13–150)
GLOBULIN UR ELPH-MCNC: 2.8 GM/DL
GLUCOSE SERPL-MCNC: 249 MG/DL (ref 65–99)
HCT VFR BLD AUTO: 41.5 % (ref 34–46.6)
HDLC SERPL-MCNC: 33 MG/DL (ref 40–60)
HGB BLD-MCNC: 13.9 G/DL (ref 12–15.9)
IMM GRANULOCYTES # BLD AUTO: 0.03 10*3/MM3 (ref 0–0.05)
IMM GRANULOCYTES NFR BLD AUTO: 0.5 % (ref 0–0.5)
IRON 24H UR-MRATE: 55 MCG/DL (ref 37–145)
IRON SATN MFR SERPL: 14 % (ref 20–50)
LDLC SERPL CALC-MCNC: 115 MG/DL (ref 0–100)
LDLC/HDLC SERPL: 3.21 {RATIO}
LYMPHOCYTES # BLD AUTO: 2.45 10*3/MM3 (ref 0.7–3.1)
LYMPHOCYTES NFR BLD AUTO: 39.5 % (ref 19.6–45.3)
MCH RBC QN AUTO: 28.5 PG (ref 26.6–33)
MCHC RBC AUTO-ENTMCNC: 33.5 G/DL (ref 31.5–35.7)
MCV RBC AUTO: 85 FL (ref 79–97)
MONOCYTES # BLD AUTO: 0.58 10*3/MM3 (ref 0.1–0.9)
MONOCYTES NFR BLD AUTO: 9.3 % (ref 5–12)
NEUTROPHILS NFR BLD AUTO: 2.84 10*3/MM3 (ref 1.7–7)
NEUTROPHILS NFR BLD AUTO: 45.7 % (ref 42.7–76)
NRBC BLD AUTO-RTO: 0 /100 WBC (ref 0–0.2)
PLATELET # BLD AUTO: 295 10*3/MM3 (ref 140–450)
PMV BLD AUTO: 10.3 FL (ref 6–12)
POTASSIUM SERPL-SCNC: 4.4 MMOL/L (ref 3.5–5.2)
PROT SERPL-MCNC: 6.7 G/DL (ref 6–8.5)
RBC # BLD AUTO: 4.88 10*6/MM3 (ref 3.77–5.28)
SODIUM SERPL-SCNC: 138 MMOL/L (ref 136–145)
T3FREE SERPL-MCNC: 3.04 PG/ML (ref 2–4.4)
T4 FREE SERPL-MCNC: 0.98 NG/DL (ref 0.93–1.7)
TIBC SERPL-MCNC: 392 MCG/DL (ref 298–536)
TRANSFERRIN SERPL-MCNC: 263 MG/DL (ref 200–360)
TRIGL SERPL-MCNC: 346 MG/DL (ref 0–150)
TSH SERPL DL<=0.05 MIU/L-ACNC: 3.58 UIU/ML (ref 0.27–4.2)
VLDLC SERPL-MCNC: 60 MG/DL (ref 5–40)
WBC NRBC COR # BLD AUTO: 6.21 10*3/MM3 (ref 3.4–10.8)

## 2024-05-15 PROCEDURE — 84466 ASSAY OF TRANSFERRIN: CPT | Performed by: NURSE PRACTITIONER

## 2024-05-15 PROCEDURE — 84481 FREE ASSAY (FT-3): CPT | Performed by: NURSE PRACTITIONER

## 2024-05-15 PROCEDURE — 99396 PREV VISIT EST AGE 40-64: CPT | Performed by: NURSE PRACTITIONER

## 2024-05-15 PROCEDURE — 1160F RVW MEDS BY RX/DR IN RCRD: CPT | Performed by: NURSE PRACTITIONER

## 2024-05-15 PROCEDURE — 3052F HG A1C>EQUAL 8.0%<EQUAL 9.0%: CPT | Performed by: NURSE PRACTITIONER

## 2024-05-15 PROCEDURE — 82728 ASSAY OF FERRITIN: CPT | Performed by: NURSE PRACTITIONER

## 2024-05-15 PROCEDURE — 83540 ASSAY OF IRON: CPT | Performed by: NURSE PRACTITIONER

## 2024-05-15 PROCEDURE — 85025 COMPLETE CBC W/AUTO DIFF WBC: CPT | Performed by: NURSE PRACTITIONER

## 2024-05-15 PROCEDURE — 82306 VITAMIN D 25 HYDROXY: CPT | Performed by: NURSE PRACTITIONER

## 2024-05-15 PROCEDURE — 84439 ASSAY OF FREE THYROXINE: CPT | Performed by: NURSE PRACTITIONER

## 2024-05-15 PROCEDURE — 80061 LIPID PANEL: CPT | Performed by: NURSE PRACTITIONER

## 2024-05-15 PROCEDURE — 1125F AMNT PAIN NOTED PAIN PRSNT: CPT | Performed by: NURSE PRACTITIONER

## 2024-05-15 PROCEDURE — 84443 ASSAY THYROID STIM HORMONE: CPT | Performed by: NURSE PRACTITIONER

## 2024-05-15 PROCEDURE — 3079F DIAST BP 80-89 MM HG: CPT | Performed by: NURSE PRACTITIONER

## 2024-05-15 PROCEDURE — 3075F SYST BP GE 130 - 139MM HG: CPT | Performed by: NURSE PRACTITIONER

## 2024-05-15 PROCEDURE — 80053 COMPREHEN METABOLIC PANEL: CPT | Performed by: NURSE PRACTITIONER

## 2024-05-15 PROCEDURE — 1159F MED LIST DOCD IN RCRD: CPT | Performed by: NURSE PRACTITIONER

## 2024-05-15 RX ORDER — DULOXETIN HYDROCHLORIDE 60 MG/1
60 CAPSULE, DELAYED RELEASE ORAL DAILY
Qty: 30 CAPSULE | Refills: 5 | Status: SHIPPED | OUTPATIENT
Start: 2024-05-15

## 2024-05-15 RX ORDER — PANTOPRAZOLE SODIUM 40 MG/1
40 TABLET, DELAYED RELEASE ORAL DAILY
Qty: 30 TABLET | Refills: 5 | Status: SHIPPED | OUTPATIENT
Start: 2024-05-15

## 2024-05-15 RX ORDER — ARIPIPRAZOLE 5 MG/1
7.5 TABLET ORAL DAILY
Qty: 45 TABLET | Refills: 5 | Status: SHIPPED | OUTPATIENT
Start: 2024-05-15

## 2024-05-15 RX ORDER — ALBUTEROL SULFATE 90 UG/1
2 POWDER, METERED RESPIRATORY (INHALATION) EVERY 6 HOURS PRN
Qty: 18 G | Refills: 2 | Status: SHIPPED | OUTPATIENT
Start: 2024-05-15

## 2024-05-15 RX ORDER — AMLODIPINE BESYLATE 10 MG/1
10 TABLET ORAL DAILY
Qty: 30 TABLET | Refills: 5 | Status: SHIPPED | OUTPATIENT
Start: 2024-05-15

## 2024-05-15 RX ORDER — MELOXICAM 7.5 MG/1
7.5 TABLET ORAL DAILY
COMMUNITY
Start: 2024-05-07

## 2024-05-15 RX ORDER — LOSARTAN POTASSIUM 25 MG/1
25 TABLET ORAL 2 TIMES DAILY
Qty: 60 TABLET | Refills: 0 | Status: SHIPPED | OUTPATIENT
Start: 2024-05-15

## 2024-05-15 RX ORDER — ERGOCALCIFEROL 1.25 MG/1
50000 CAPSULE ORAL WEEKLY
Qty: 5 CAPSULE | Refills: 5 | Status: SHIPPED | OUTPATIENT
Start: 2024-05-15

## 2024-05-15 RX ORDER — IRON POLYSACCHARIDE COMPLEX 150 MG
150 CAPSULE ORAL DAILY
Qty: 90 CAPSULE | Refills: 1 | Status: SHIPPED | OUTPATIENT
Start: 2024-05-15

## 2024-05-15 RX ORDER — HYDROCHLOROTHIAZIDE 12.5 MG/1
12.5 TABLET ORAL DAILY
Qty: 30 TABLET | Refills: 5 | Status: SHIPPED | OUTPATIENT
Start: 2024-05-15

## 2024-05-15 RX ORDER — LEVOTHYROXINE SODIUM 0.03 MG/1
25 TABLET ORAL DAILY
Qty: 30 TABLET | Refills: 5 | Status: SHIPPED | OUTPATIENT
Start: 2024-05-15

## 2024-05-15 RX ORDER — EZETIMIBE 10 MG/1
10 TABLET ORAL DAILY
Qty: 30 TABLET | Refills: 5 | Status: SHIPPED | OUTPATIENT
Start: 2024-05-15

## 2024-05-15 RX ORDER — MONTELUKAST SODIUM 10 MG/1
10 TABLET ORAL EVERY EVENING
Qty: 30 TABLET | Refills: 5 | Status: SHIPPED | OUTPATIENT
Start: 2024-05-15

## 2024-05-15 RX ORDER — TRAZODONE HYDROCHLORIDE 50 MG/1
50 TABLET ORAL NIGHTLY
Qty: 30 TABLET | Refills: 5 | Status: CANCELLED | OUTPATIENT
Start: 2024-05-15

## 2024-05-15 NOTE — PROGRESS NOTES
Chief Complaint  Depression, Diabetes, Hyperlipidemia, and Hypertension    Subjective          Carlota Robin presents to [facility]  History of Present Illness  Here for check up:  Dentist: Yearly  Eye: Yearly  Mammo: 03/11/2024 but due a follow up in 9/2024  Pap: over 3 years ago she has had a hysterectomy  Family hx: No change  Dexa: NEEDS has placed the order  Colonoscopy: 2019 due in 2029    Hypertension: She is well controlled with her norvasc and hydrochlorothiazide.  She also takes her amlodipine daily.  No chest pain or shortness of breath noted.   Vitamin D deficiency and vitamin B12 deficiency: She is taking her vitamin D on a weekly basis.    Allergies: She is taking her Singulair daily.    Diabetes: She had her A1c checked by endocrinology and it is down to 9.   Fibro-: She is taking her Mobic and her Cymbalta on a daily basis.  It is doing well controlled currently.    Thyroid: She is taking her levothyroxine daily and is due lab work today.  Anemia: She takes her iron on a daily basis and is needing iron profile today along with ferritin to see how her levels are.  Anxiety: She is taking the Cymbalta also for the anxiety and the Abilify is doing so-so.  She feels that she is having more concentration issues and focusing issues.  She does see psychiatry on 5/28/2024 no suicidal thoughts or hallucinations.  GERD: She is taking her Tagamet every day morning and night.  No nausea or vomiting.    Depression: At risk (4/15/2024)    PHQ-2     PHQ-2 Score: 12    and 4/15/2024               Allergies  Pravastatin sodium, Hydrocodone-acetaminophen, Lisinopril, and Pollen extract    Social History     Tobacco Use    Smoking status: Never     Passive exposure: Never    Smokeless tobacco: Never   Vaping Use    Vaping status: Never Used   Substance Use Topics    Alcohol use: Never    Drug use: Never       Family History   Problem Relation Age of Onset    Stroke Mother     Diabetes  "Mother     Restless legs syndrome Mother     Hyperlipidemia Mother     Nephrolithiasis Father     Sleep apnea Father     Depression Father     Restless legs syndrome Sister     Colon cancer Maternal Grandmother         50S    Colon cancer Paternal Grandfather         70S    Diabetes Maternal Uncle     Breast cancer Other         20S    Diabetes Other         Health Maintenance Due   Topic Date Due    ANNUAL PHYSICAL  Never done    DXA SCAN  Never done        Immunization History   Administered Date(s) Administered    Influenza, Unspecified 11/25/2019    Tdap 11/25/2019       Review of Systems   Constitutional:  Positive for fatigue.   Respiratory:  Negative for cough and shortness of breath.    Cardiovascular:  Negative for chest pain.   Gastrointestinal:  Negative for diarrhea, nausea and vomiting.   Psychiatric/Behavioral:  Positive for decreased concentration.         Objective       Vitals:    05/15/24 0857   BP: 133/84   Pulse: 88   Resp: 16   Temp: 97.2 °F (36.2 °C)   SpO2: 95%   Weight: 97.3 kg (214 lb 6.4 oz)   Height: 152.4 cm (60\")       Body mass index is 41.87 kg/m².         Physical Exam  Vitals and nursing note reviewed.   Constitutional:       General: She is not in acute distress.     Appearance: Normal appearance. She is not ill-appearing.   HENT:      Right Ear: Ear canal normal.      Left Ear: Ear canal normal.      Ears:      Comments: Fluid filled TM and she will use nasal spray at night     Nose: Rhinorrhea present. No congestion.      Mouth/Throat:      Pharynx: Posterior oropharyngeal erythema present. No oropharyngeal exudate.   Eyes:      Conjunctiva/sclera: Conjunctivae normal.   Neck:      Vascular: No carotid bruit.   Cardiovascular:      Rate and Rhythm: Normal rate and regular rhythm.      Heart sounds: No murmur heard.  Pulmonary:      Effort: Pulmonary effort is normal.      Breath sounds: Normal breath sounds. No wheezing or rhonchi.   Abdominal:      General: Bowel sounds are " normal.      Palpations: Abdomen is soft.   Musculoskeletal:      Cervical back: No tenderness.      Right lower leg: No edema.      Left lower leg: No edema.   Skin:     Findings: No bruising or rash.   Neurological:      General: No focal deficit present.      Mental Status: She is alert and oriented to person, place, and time. Mental status is at baseline.   Psychiatric:         Mood and Affect: Mood normal.         Behavior: Behavior normal.         Thought Content: Thought content normal.         Judgment: Judgment normal.               Result Review :     The following data was reviewed by: RAHEEM Burgos on 05/15/2024:    Common Labs   Common labs          1/23/2024    09:56 4/11/2024    10:53 5/15/2024    09:50   Common Labs   Glucose   249    BUN   10    Creatinine   0.72    Sodium   138    Potassium   4.4    Chloride   102    Calcium   9.3    Albumin   3.9    Total Bilirubin   <0.2    Alkaline Phosphatase   126    AST (SGOT)   18    ALT (SGPT)   25    WBC   6.21    Hemoglobin   13.9    Hematocrit   41.5    Platelets   295    Total Cholesterol   208    Triglycerides   346    HDL Cholesterol   33    LDL Cholesterol    115    Hemoglobin A1C 9.7  9.0       A1C   Most Recent A1C          4/11/2024    10:53   HGBA1C Most Recent   Hemoglobin A1C 9.0            XR Spine Lumbar Complete 4+VW    Result Date: 4/17/2024  Multilevel degenerative disease as above. No fracture or malalignment.    Electronically Signed By-Dr. Gwyn Deutsch MD On:4/17/2024 4:49 AM      XR Spine Cervical Complete 4 or 5 View    Result Date: 4/17/2024  Multilevel degenerative disease as described. No fracture or malalignment.  Electronically Signed By-Dr. Gwyn Deutsch MD On:4/17/2024 4:47 AM      XR Spine Thoracic 3 View    Result Date: 4/17/2024  Degenerative disease with mild scoliosis. Otherwise negative.    Electronically Signed By-Dr. Gwyn Deutsch MD On:4/17/2024 4:45 AM      Mammo Screening Digital Tomosynthesis  Bilateral With CAD    Result Date: 2/22/2024   1. Right breast:  Need additional imaging.  Recommend a right diagnostic mammogram.  2. Left breast:  Benign mammogram.  RECOMMENDATION(S):  ADDITIONAL MAMMOGRAPHIC VIEWS REQUIRED: RIGHT BREAST  --NEED ADDITIONAL IMAGING EVALUATION.   BIRADS:  DIAGNOSTIC CATEGORY 0--INCOMPLETE: NEEDS ADDITIONAL IMAGING EVALUATION.   BREAST COMPOSITION: Scattered areas fibroglandular density.  PLEASE NOTE:  A NORMAL MAMMOGRAM DOES NOT EXCLUDE THE POSSIBILITY OF BREAST CANCER. ANY CLINICALLY SUSPICIOUS PALPABLE LUMP SHOULD BE BIOPSIED.      DIEGO FISCHER MD       Electronically Signed and Approved By: DIEGO FISCHER MD on 2/22/2024 at 8:51                         Assessment and Plan      Assessment & Plan  Annual physical exam  ADVICE WAS GIVEN RE:  ALCOHOL USE, SEATBELT USE, REGULAR EXERCISE, HEALTHY DIET, ROUTINE EYE AND DENTAL EXAMS  I will reach out to psychiatry and see what they think about  Primary hypertension    Anxiety    Gastroesophageal reflux disease without esophagitis    Fibromyalgia    Hyperlipidemia, unspecified hyperlipidemia type     Iron deficiency anemia, unspecified iron deficiency anemia type    Acquired hypothyroidism    Seasonal allergic rhinitis, unspecified trigger    Other insomnia    Vitamin D deficiency    Postmenopause    Concentration deficit      Orders Placed This Encounter   Procedures    DEXA Bone Density Axial    Comprehensive Metabolic Panel    TSH    Lipid Panel    Vitamin D,25-Hydroxy    T4, Free    T3, Free    Ferritin    Iron Profile    CBC Auto Differential    CBC & Differential     New Medications Ordered This Visit   Medications    amLODIPine (NORVASC) 10 MG tablet     Sig: Take 1 tablet by mouth Daily.     Dispense:  30 tablet     Refill:  5    ARIPiprazole (ABILIFY) 5 MG tablet     Sig: Take 1.5 tablets by mouth Daily.     Dispense:  45 tablet     Refill:  5    Calcium Carbonate 1500 (600 Ca) MG tablet     Sig: Take 1 tablet by mouth  Daily.     Dispense:  30 tablet     Refill:  5    cimetidine (TAGAMET) 200 MG tablet     Sig: Take 1 tablet by mouth 2 (Two) Times a Day Before Meals.     Dispense:  60 tablet     Refill:  5    DULoxetine (CYMBALTA) 60 MG capsule     Sig: Take 1 capsule by mouth Daily.     Dispense:  30 capsule     Refill:  5    ezetimibe (Zetia) 10 MG tablet     Sig: Take 1 tablet by mouth Daily.     Dispense:  30 tablet     Refill:  5    hydroCHLOROthiazide 12.5 MG tablet     Sig: Take 1 tablet by mouth Daily.     Dispense:  30 tablet     Refill:  5    iron polysaccharides (Ferrex 150) 150 MG capsule     Sig: Take 1 capsule by mouth Daily.     Dispense:  90 capsule     Refill:  1    levothyroxine (SYNTHROID, LEVOTHROID) 25 MCG tablet     Sig: Take 1 tablet by mouth Daily.     Dispense:  30 tablet     Refill:  5    losartan (COZAAR) 25 MG tablet     Sig: Take 1 tablet by mouth 2 (Two) Times a Day.     Dispense:  60 tablet     Refill:  0    montelukast (SINGULAIR) 10 MG tablet     Sig: Take 1 tablet by mouth Every Evening.     Dispense:  30 tablet     Refill:  5    pantoprazole (PROTONIX) 40 MG EC tablet     Sig: Take 1 tablet by mouth Daily.     Dispense:  30 tablet     Refill:  5    albuterol (ProAir RespiClick) 108 (90 Base) MCG/ACT inhaler     Sig: Inhale 2 puffs Every 6 (Six) Hours As Needed for Wheezing or Shortness of Air.     Dispense:  18 g     Refill:  2    vitamin D (ERGOCALCIFEROL) 1.25 MG (74424 UT) capsule capsule     Sig: Take 1 capsule by mouth 1 (One) Time Per Week.     Dispense:  5 capsule     Refill:  5    Vitamin E 90 MG (200 UNIT) capsule     Sig: Take 1 capsule by mouth Daily.     Dispense:  30 capsule     Refill:  5          Diagnosis Plan   1. Annual physical exam  CBC & Differential    Comprehensive Metabolic Panel    TSH    Lipid Panel    Vitamin D,25-Hydroxy    T4, Free    T3, Free    Ferritin    Iron Profile      2. Primary hypertension  amLODIPine (NORVASC) 10 MG tablet    hydroCHLOROthiazide 12.5 MG  tablet    losartan (COZAAR) 25 MG tablet      3. Anxiety  ARIPiprazole (ABILIFY) 5 MG tablet    DULoxetine (CYMBALTA) 60 MG capsule      4. Gastroesophageal reflux disease without esophagitis  cimetidine (TAGAMET) 200 MG tablet    pantoprazole (PROTONIX) 40 MG EC tablet      5. Fibromyalgia  DULoxetine (CYMBALTA) 60 MG capsule    Vitamin E 90 MG (200 UNIT) capsule      6. Hyperlipidemia, unspecified hyperlipidemia type  ezetimibe (Zetia) 10 MG tablet      7. Iron deficiency anemia, unspecified iron deficiency anemia type  iron polysaccharides (Ferrex 150) 150 MG capsule      8. Acquired hypothyroidism  levothyroxine (SYNTHROID, LEVOTHROID) 25 MCG tablet      9. Seasonal allergic rhinitis, unspecified trigger  montelukast (SINGULAIR) 10 MG tablet    albuterol (ProAir RespiClick) 108 (90 Base) MCG/ACT inhaler      10. Other insomnia        11. Vitamin D deficiency  Calcium Carbonate 1500 (600 Ca) MG tablet    vitamin D (ERGOCALCIFEROL) 1.25 MG (20991 UT) capsule capsule      12. Postmenopause  DEXA Bone Density Axial      13. Concentration deficit                  Follow Up     Return in about 6 months (around 11/15/2024).  Follow-up in 6 months for labs and appt. Call with any concerns or questions that you may have regarding your medications or history.    I have reviewed all medications and at this time no medications changes need to be adjusted for all chronic conditions.  I will reach out to psychiatry to see about ADD medication.  When I did reach out to [facility] she advised me to potentially increase her Wellbutrin or even add clonidine.  I will reach out to the patient.  Patient was given instructions and counseling regarding her condition or for health maintenance advice. Please see specific information pulled into the AVS if appropriate.     Parts of this note are electronic transcriptions/translations of spoken language to printed text using the Dragon Dictation system.          Audra Tran,  RAHEEM  05/15/2024  Answers submitted by the patient for this visit:  Other (Submitted on 5/8/2024)  Please describe your symptoms.: blood work  Have you had these symptoms before?: No  How long have you been having these symptoms?: 1-4 days  Primary Reason for Visit (Submitted on 5/8/2024)  What is the primary reason for your visit?: Other

## 2024-05-21 RX ORDER — CLONIDINE HYDROCHLORIDE 0.1 MG/1
0.1 TABLET ORAL NIGHTLY
Qty: 30 TABLET | Refills: 0 | Status: SHIPPED | OUTPATIENT
Start: 2024-05-21

## 2024-05-21 NOTE — TELEPHONE ENCOUNTER
From: Audra Tran  To: Carlota Robin  Sent: 5/15/2024 10:23 PM EDT  Subject: ADD med    HI!   I did reach out the [facility] and she advised for me to increase the Wellbutrin to 300mg or do the med that we did discuss of the Clonidine 0.1mg. If you wish for me to increase the Wellbutrin to 300mg or trial the low dose Clonidine at night just let me know.   Audra

## 2024-05-22 NOTE — PROGRESS NOTES
Chief Complaint  No chief complaint on file.    Referred By: DONNELL Burgos    Subjective          Carlota MONTOYA Lobito presents to [facility] for diabetes medication management    History of Present Illness    Visit type:  follow-up  Diabetes type:  Type 2  Current diabetes status/concerns/issues:  Reports she did not increase the dose of Mounjaro due to backorder issues. Reports her blood sugar has been higher lately. States her fbs is normally around 240mg/dl.   Other health concerns: No new health concerns  Current Diabetes symptoms:    Polyuria: No   Polydipsia: No   Polyphagia: Yes     Blurred vision: No   Excessive fatigue: Yes    Known Diabetes complications:  Neuropathy: None; Location: N/A  Renal: None  Eyes: None; Location: N/A; Last Eye Exam:  2022 ; Location: [facility]  Amputation/Wounds: None  GI: Reflux  Cardiovascular: Hypertension and Hyperlipidemia  ED: N/A  Other: None  Hypoglycemia:  None reported at this time and 0% per CGM  Hypoglycemia Symptoms:  No hypoglycemia at this time  Current diabetes treatment:   Mounjaro 10 mg weekly, Toujeo 70 units bid, Humalog per sliding scale for glucose over 150mg/dl plus carb correction   Blood glucose device:  Reimage CGM  Blood glucose monitoring frequency:  Continuous per CGM  Blood glucose range/average:  The 14-day sensor report shows an average glucose of 226mg/dL, with 17% in target range ( mgdL), 55% in the high range (181-250 mg/dL), 28% in the very high range (>250 mg/dL), 0% in the low range (54-70 mg/dL) and 0% in the very low range (<54 mg/dL).   Glucose Source: Device Reviewed  Dietary behavior:  Avoids sugary drinks, Number of meals each day - 3; Number of snacks each day - 1-2, she eliminated sugar from her diet and decreased her carb intake.   Activity/Exercise:  she has been able to be more active around the house. She is going to start exercising.    Past Medical History:   Diagnosis Date    Acne      Allergic     Allergic rhinitis due to allergen 04/15/2021    Allergies     Anemia 2020    Anesthesia     HARD TO WAKE UP POSTOP    Anxiety 04/15/2021    Asthma     INHALERS PRN    Bundle branch block, right     SEE'S DR BENNETT IN 2019, NOW SEES ON AS NEEDED BASIS, MAINLY FOLLOW PCP ROUTINELY. DENIED CP/SOB    Depression     Diabetes     Diabetes mellitus, type 2 2020    DOESN'T CHECK BG AT HOME    Essential hypertension 2020    Fatigue 04/15/2021    Fibromyalgia     GERD (gastroesophageal reflux disease) 2020    High cholesterol     Hyperlipidemia 2020    Hypothyroidism 2020    Inguinal hernia     Insomnia     Macromastia     Migraine     Obesity 2020    DANY (obstructive sleep apnea) 2020    Palpitation     NO PROBLEMS CURRENTLY. FOLLOWS PCP    Right bundle branch block 2020    SEE'S DR BLUE ON AS NEEDED BASIS, SEE'S PCP ON YEARLY. DENIED CP/SOB    RLS (restless legs syndrome)     Seasonal allergies     Sinus trouble     Stomach pain 2016    Stress 2016    Thyroid disease     Urinary incontinence     Vitamin B 12 deficiency     Vitamin D deficiency 2020     Past Surgical History:   Procedure Laterality Date    BILATERAL BREAST REDUCTION Bilateral 2022    Procedure: BREAST REDUCTION;  Surgeon: Zeeshan Bone MD;  [place] [facility] [place] [facility] [place]  Service: [place];  Laterality: Bilateral;    BREAST BIOPSY Right      SECTION      CHOLECYSTECTOMY      COLONOSCOPY      CYSTOSCOPY BOTOX INJECTION OF BLADDER  2019    ENDOSCOPY      HERNIA REPAIR      HYSTERECTOMY  2017    WISDOM TOOTH EXTRACTION       Family History   Problem Relation Age of Onset    Stroke Mother     Diabetes Mother     Restless legs syndrome Mother     Hyperlipidemia Mother     Nephrolithiasis Father     Sleep apnea Father     Depression Father     Restless legs syndrome Sister     Colon cancer Maternal Grandmother         50S    Colon cancer Paternal Grandfather          70S    Diabetes Maternal Uncle     Breast cancer Other         20S    Diabetes Other      Social History     Socioeconomic History    Marital status:    Tobacco Use    Smoking status: Never     Passive exposure: Never    Smokeless tobacco: Never   Vaping Use    Vaping status: Never Used   Substance and Sexual Activity    Alcohol use: Never    Drug use: Never    Sexual activity: Yes     Partners: Female     Birth control/protection: Hysterectomy     Allergies   Allergen Reactions    Pravastatin Sodium Nausea Only    Hydrocodone-Acetaminophen Hallucinations     OK TO TAKE REGULAR TYLENOL     Lisinopril Cough    Pollen Extract Other (See Comments)     CONGESTION, WATERY EYES, SNEEZING        Current Outpatient Medications:     albuterol (ProAir RespiClick) 108 (90 Base) MCG/ACT inhaler, Inhale 2 puffs Every 6 (Six) Hours As Needed for Wheezing or Shortness of Air., Disp: 18 g, Rfl: 2    amLODIPine (NORVASC) 10 MG tablet, Take 1 tablet by mouth Daily., Disp: 30 tablet, Rfl: 5    ARIPiprazole (ABILIFY) 5 MG tablet, Take 1.5 tablets by mouth Daily., Disp: 45 tablet, Rfl: 5    Blood Glucose Monitoring Suppl (FreeStyle Lite) w/Device kit, , Disp: , Rfl:     buPROPion XL (WELLBUTRIN XL) 150 MG 24 hr tablet, Take 1 tablet by mouth Daily., Disp: , Rfl:     Calcium Carbonate 1500 (600 Ca) MG tablet, Take 1 tablet by mouth Daily., Disp: 30 tablet, Rfl: 5    cetirizine (zyrTEC) 10 MG tablet, Take 1 tablet by mouth Daily., Disp: , Rfl:     cimetidine (TAGAMET) 200 MG tablet, Take 1 tablet by mouth 2 (Two) Times a Day Before Meals., Disp: 60 tablet, Rfl: 5    cloNIDine (Catapres) 0.1 MG tablet, Take 1 tablet by mouth Every Night., Disp: 30 tablet, Rfl: 0    Continuous Blood Gluc Sensor (FreeStyle Harley 3 Sensor) misc, Use 1 each Every 14 (Fourteen) Days., Disp: 2 each, Rfl: 5    cyanocobalamin (VITAMIN B-12) 1000 MCG tablet, Vitamin B-12 1,000 mcg oral tablet take 1 tablet by oral route daily   Active, Disp: , Rfl:      DULoxetine (CYMBALTA) 60 MG capsule, Take 1 capsule by mouth Daily., Disp: 30 capsule, Rfl: 5    ezetimibe (Zetia) 10 MG tablet, Take 1 tablet by mouth Daily., Disp: 30 tablet, Rfl: 5    FREESTYLE LITE test strip, 1 each by Other route Daily for 180 days. Use as instructed, Disp: 90 each, Rfl: 1    gabapentin (NEURONTIN) 100 MG capsule, Take 1 capsule twice a day by oral route for 30 days., Disp: , Rfl:     gabapentin (NEURONTIN) 300 MG capsule, Take 400 mg by mouth Daily., Disp: , Rfl:     hydroCHLOROthiazide 12.5 MG tablet, Take 1 tablet by mouth Daily., Disp: 30 tablet, Rfl: 5    Insulin Glargine, 1 Unit Dial, (Toujeo SoloStar) 300 UNIT/ML solution pen-injector injection, Inject 70 Units under the skin into the appropriate area as directed 2 (Two) Times a Day for 180 days., Disp: 42 mL, Rfl: 1    Insulin Lispro, 1 Unit Dial, (HumaLOG KwikPen) 100 UNIT/ML solution pen-injector, Per sliding scale tid with meals. Max daily dose of 40 units, Disp: 35 mL, Rfl: 1    Insulin Pen Needle (Pen Needles) 32G X 4 MM misc, Use 1 each 5 (Five) Times a Day., Disp: 450 each, Rfl: 1    iron polysaccharides (Ferrex 150) 150 MG capsule, Take 1 capsule by mouth Daily., Disp: 90 capsule, Rfl: 1    levothyroxine (SYNTHROID, LEVOTHROID) 25 MCG tablet, Take 1 tablet by mouth Daily., Disp: 30 tablet, Rfl: 5    lisinopril (PRINIVIL,ZESTRIL) 5 MG tablet, Take 1 tablet by mouth Daily., Disp: , Rfl:     losartan (COZAAR) 25 MG tablet, Take 1 tablet by mouth 2 (Two) Times a Day., Disp: 60 tablet, Rfl: 0    meloxicam (MOBIC) 7.5 MG tablet, Take 1 tablet by mouth Daily., Disp: , Rfl:     montelukast (SINGULAIR) 10 MG tablet, Take 1 tablet by mouth Every Evening., Disp: 30 tablet, Rfl: 5    ondansetron (Zofran) 4 MG tablet, Take 1 tablet by mouth Every 8 (Eight) Hours As Needed for Nausea or Vomiting., Disp: 30 tablet, Rfl: 0    pantoprazole (PROTONIX) 40 MG EC tablet, Take 1 tablet by mouth Daily., Disp: 30 tablet, Rfl: 5    promethazine  "(PHENERGAN) 12.5 MG tablet, Take 1 tablet by mouth Every 6 (Six) Hours As Needed for Nausea or Vomiting., Disp: 20 tablet, Rfl: 0    saccharomyces boulardii (FLORASTOR) 250 MG capsule, Take 1 capsule by mouth 2 (Two) Times a Day., Disp: , Rfl:     solifenacin (VESICARE) 5 MG tablet, Vesicare 5 mg oral tablet take 1 tablet (5 mg) by oral route once daily   Suspended, Disp: , Rfl:     topiramate (TOPAMAX) 50 MG tablet, , Disp: , Rfl:     traZODone (DESYREL) 50 MG tablet, Take 1 tablet by mouth Every Night., Disp: 30 tablet, Rfl: 5    vitamin D (ERGOCALCIFEROL) 1.25 MG (17849 UT) capsule capsule, Take 1 capsule by mouth 1 (One) Time Per Week., Disp: 5 capsule, Rfl: 5    Vitamin E 90 MG (200 UNIT) capsule, Take 1 capsule by mouth Daily., Disp: 30 capsule, Rfl: 5    Tirzepatide (Mounjaro) 12.5 MG/0.5ML solution pen-injector pen, Inject 0.5 mL under the skin into the appropriate area as directed Every 7 (Seven) Days., Disp: 2 mL, Rfl: 0    Objective     Vitals:    05/23/24 1052   BP: 134/78   BP Location: Left arm   Patient Position: Sitting   Cuff Size: Adult   Resp: 18   Temp: 97.9 °F (36.6 °C)   TempSrc: Temporal   Weight: 97.3 kg (214 lb 6.4 oz)   Height: 152.4 cm (60\")     Body mass index is 41.87 kg/m².    Physical Exam  Constitutional:       Appearance: Normal appearance. She is obese.      Comments: Severe Obesity (BMI >= 40) Pt Current BMI = 41.87      HENT:      Head: Normocephalic and atraumatic.      Right Ear: External ear normal.      Left Ear: External ear normal.      Nose: Nose normal.   Eyes:      Extraocular Movements: Extraocular movements intact.      Conjunctiva/sclera: Conjunctivae normal.   Pulmonary:      Effort: Pulmonary effort is normal.   Musculoskeletal:         General: Normal range of motion.      Cervical back: Normal range of motion.   Skin:     General: Skin is warm and dry.   Neurological:      General: No focal deficit present.      Mental Status: She is alert and oriented to person, " place, and time. Mental status is at baseline.   Psychiatric:         Mood and Affect: Mood normal.         Behavior: Behavior normal.         Thought Content: Thought content normal.         Judgment: Judgment normal.             Result Review :   The following data was reviewed by: RAHEEM Garcia on 05/23/2024:    Most Recent A1C          4/11/2024    10:53   HGBA1C Most Recent   Hemoglobin A1C 9.0        A1C Last 3 Results          11/15/2023    09:28 1/23/2024    09:56 4/11/2024    10:53   HGBA1C Last 3 Results   Hemoglobin A1C 11.20  9.7  9.0      A1c collected on 4/11/24  is 9%, indicating Uncontrolled Type II diabetes.  This result is down from the prior result of 9.7% collected on 1/23/24     Glucose   Date Value Ref Range Status   05/23/2024 303 (H) 70 - 99 mg/dL Final     Comment:     Serial Number: [id number]:  [id number]   09/22/2023 394 (H) 70 - 110 mg/dL Final     Point of care glucose in the office today is  elevated at 303mg/dl    Creatinine   Date Value Ref Range Status   05/15/2024 0.72 0.57 - 1.00 mg/dL Final   11/15/2023 0.62 0.57 - 1.00 mg/dL Final     eGFR   Date Value Ref Range Status   05/15/2024 99.5 >60.0 mL/min/1.73 Final   11/15/2023 106.0 >60.0 mL/min/1.73 Final     Labs collected on 5/15/24 show Normal values    Microalbumin, Urine   Date Value Ref Range Status   11/15/2023 <1.2 mg/dL Final   05/15/2023 2.1 mg/dL Final     Creatinine, Urine   Date Value Ref Range Status   03/29/2022 99.0 mg/dL Final     Microalbumin/Creatinine Ratio   Date Value Ref Range Status   03/29/2022 44.4 mg/g Final   12/14/2020 15.2 0.0 - 35.0 mg/g[Cre] Final     Urine microalbuminuria collected on 11/15/23 is negative for microalbuminuria    Total Cholesterol   Date Value Ref Range Status   05/15/2024 208 (H) 0 - 200 mg/dL Final   11/15/2023 251 (H) 0 - 200 mg/dL Final     Triglycerides   Date Value Ref Range Status   05/15/2024 346 (H) 0 - 150 mg/dL Final   11/15/2023 530 (H) 0 - 150 mg/dL  Final     HDL Cholesterol   Date Value Ref Range Status   05/15/2024 33 (L) 40 - 60 mg/dL Final   11/15/2023 34 (L) 40 - 60 mg/dL Final     LDL Cholesterol    Date Value Ref Range Status   05/15/2024 115 (H) 0 - 100 mg/dL Final   11/15/2023 122 (H) 0 - 100 mg/dL Final     Lipid panel collected on 5/15/24 shows Hyperlipidemia, Hypertriglyceridemia, and low HDL            Assessment: Pt is here today for a 1 month follow up on her diabetes. Last visit her dose of Mounjaro was increased from 10 mg once weekly to 12.5 mg once weekly and her Toujeo was increased from 68 units twice daily to 70 units twice daily. Reports she did not increase the dose of Mounjaro due to backorder issues. Reports her blood sugar has been higher lately. States her fbs is normally around 240mg/dl. Reviewed CGM report with pt in the office today. The 14-day sensor report shows an average glucose of 226mg/dL, with 17% in target range ( mgdL), 55% in the high range (181-250 mg/dL), 28% in the very high range (>250 mg/dL), 0% in the low range (54-70 mg/dL) and 0% in the very low range (<54 mg/dL). Her A1c on 4/11/24 was 9.1% which is down from her previous A1c of 9.7% in January. In review of her CGM her GMI is 8.7% showing her glucose levels are trending down.     Diagnoses and all orders for this visit:    1. Type 2 diabetes mellitus with hyperglycemia, with long-term current use of insulin (Primary)  -     Tirzepatide (Mounjaro) 12.5 MG/0.5ML solution pen-injector pen; Inject 0.5 mL under the skin into the appropriate area as directed Every 7 (Seven) Days.  Dispense: 2 mL; Refill: 0    2. Uncontrolled type 2 diabetes mellitus with hyperglycemia    3. Severe obesity (BMI >= 40)    4. Uses self-applied continuous glucose monitoring device    Other orders  -     POC Glucose        Plan: Increased her dose of Mounjaro from 10mg weekly to 12.5mg weekly. Instructed pt to make sure to do this injection on the same day of the week. I called [facility] and they did have the Mounjaro in stock. Increased her dose of Humalog from 14 units tid with meals to 18 units tid with meals. Instructed pt to make sure to give the Humalog 15-20 minutes prior to meals to prevent postprandial hyperglycemia. Scheduled a 2 month follow up and we will review her CGM and recheck her A1c at that time.     The patient will monitor her blood glucose levels per CGM.  If she develops problematic hyperglycemia or hypoglycemia or adverse drug reactions, she will contact the office for further instructions.        Follow Up     Return in about 2 months (around 7/23/2024) for CGM Follow Up, Medication Mgmt.    Patient was given instructions and counseling regarding her condition or for health maintenance advice. Please see specific information pulled into the AVS if appropriate.     Marilee Laughlin, APRN  05/23/2024      Dictated Utilizing Dragon Dictation.  Please note that portions of this note were completed with a voice recognition program.  Part of this note may be an electronic transcription/translation of spoken language to printed text using the Dragon Dictation System.

## 2024-05-23 ENCOUNTER — OFFICE VISIT (OUTPATIENT)
Dept: DIABETES SERVICES | Facility: HOSPITAL | Age: 54
End: 2024-05-23
Payer: COMMERCIAL

## 2024-05-23 VITALS
RESPIRATION RATE: 18 BRPM | SYSTOLIC BLOOD PRESSURE: 134 MMHG | TEMPERATURE: 97.9 F | BODY MASS INDEX: 42.09 KG/M2 | HEIGHT: 60 IN | WEIGHT: 214.4 LBS | DIASTOLIC BLOOD PRESSURE: 78 MMHG

## 2024-05-23 DIAGNOSIS — Z97.8 USES SELF-APPLIED CONTINUOUS GLUCOSE MONITORING DEVICE: ICD-10-CM

## 2024-05-23 DIAGNOSIS — Z79.4 TYPE 2 DIABETES MELLITUS WITH HYPERGLYCEMIA, WITH LONG-TERM CURRENT USE OF INSULIN: Primary | ICD-10-CM

## 2024-05-23 DIAGNOSIS — E66.01 SEVERE OBESITY (BMI >= 40): ICD-10-CM

## 2024-05-23 DIAGNOSIS — E11.65 TYPE 2 DIABETES MELLITUS WITH HYPERGLYCEMIA, WITH LONG-TERM CURRENT USE OF INSULIN: Primary | ICD-10-CM

## 2024-05-23 DIAGNOSIS — E11.65 UNCONTROLLED TYPE 2 DIABETES MELLITUS WITH HYPERGLYCEMIA: ICD-10-CM

## 2024-05-23 LAB — GLUCOSE BLDC GLUCOMTR-MCNC: 303 MG/DL (ref 70–99)

## 2024-05-23 PROCEDURE — 82948 REAGENT STRIP/BLOOD GLUCOSE: CPT | Performed by: NURSE PRACTITIONER

## 2024-05-23 PROCEDURE — G0463 HOSPITAL OUTPT CLINIC VISIT: HCPCS | Performed by: NURSE PRACTITIONER

## 2024-05-23 NOTE — PATIENT INSTRUCTIONS
Increase Mounjaro from 10 mg once weekly to 12.5 mg once weekly.  Make sure to do this injection on the same day of the week.    Increase your Humalog from 14 units 3 times daily to 18 units 3 times daily with meals.  Make sure to give your Humalog 15 to 20 minutes prior to your meals

## 2024-05-28 ENCOUNTER — LAB (OUTPATIENT)
Dept: LAB | Facility: HOSPITAL | Age: 54
End: 2024-05-28
Payer: COMMERCIAL

## 2024-05-28 ENCOUNTER — OFFICE VISIT (OUTPATIENT)
Dept: PSYCHIATRY | Facility: CLINIC | Age: 54
End: 2024-05-28
Payer: COMMERCIAL

## 2024-05-28 VITALS
SYSTOLIC BLOOD PRESSURE: 139 MMHG | DIASTOLIC BLOOD PRESSURE: 89 MMHG | WEIGHT: 215 LBS | HEART RATE: 95 BPM | HEIGHT: 60 IN | BODY MASS INDEX: 42.21 KG/M2

## 2024-05-28 DIAGNOSIS — F90.9 ADULT ADHD: ICD-10-CM

## 2024-05-28 DIAGNOSIS — F33.0 MDD (MAJOR DEPRESSIVE DISORDER), RECURRENT EPISODE, MILD: ICD-10-CM

## 2024-05-28 DIAGNOSIS — F51.04 PSYCHOPHYSIOLOGICAL INSOMNIA: Primary | ICD-10-CM

## 2024-05-28 DIAGNOSIS — F41.1 GENERALIZED ANXIETY DISORDER: ICD-10-CM

## 2024-05-28 LAB
AMPHET+METHAMPHET UR QL: NEGATIVE
BARBITURATES UR QL SCN: NEGATIVE
BENZODIAZ UR QL SCN: NEGATIVE
CANNABINOIDS SERPL QL: NEGATIVE
COCAINE UR QL: NEGATIVE
FENTANYL UR-MCNC: NEGATIVE NG/ML
METHADONE UR QL SCN: NEGATIVE
OPIATES UR QL: NEGATIVE
OXYCODONE UR QL SCN: NEGATIVE

## 2024-05-28 PROCEDURE — 3079F DIAST BP 80-89 MM HG: CPT

## 2024-05-28 PROCEDURE — 80307 DRUG TEST PRSMV CHEM ANLYZR: CPT

## 2024-05-28 PROCEDURE — 3075F SYST BP GE 130 - 139MM HG: CPT

## 2024-05-28 PROCEDURE — 1159F MED LIST DOCD IN RCRD: CPT

## 2024-05-28 PROCEDURE — 90792 PSYCH DIAG EVAL W/MED SRVCS: CPT

## 2024-05-28 PROCEDURE — 1160F RVW MEDS BY RX/DR IN RCRD: CPT

## 2024-05-28 NOTE — PROGRESS NOTES
"[facility]  Initial Evaluation    Referring Provider:  Audra Tran, APRN  [id number] [place] [address]    Chief Complaint: \"I am wondering if I have ADHD\"    History of Present Illness: Carlota Robin is a 53 y.o. female who presents today for initial evaluation regarding screening for adult ADHD. Carlota presents unaccompanied in no acute distress and engages with me appropriately. Psychotropic regimen with which patient presents is described as aripiprazole 7.5 mg po q day, bupropion  mg po q day, clonidine 0.1 mg p.o. nightly duloxetine 60 mg p.o. daily.   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  Patient's  had a heart transplant and she provides medication management, transportation, and emotional support.  Patient states that her mood is stable on her current medication regimen. Her moods improve in tandem as her 's recovery milestones are being met. She also voices she has a strong support system should she need respite.   History is positive for signs/symptoms suggestive of consistent and excessive worry across several domains of life that contributes to tension and irritability throughout the day. History is positive for MDD, recurrent, mild, low mood, low energy, anhedonia, changes in sleep, changes in appetite, guilt, poor concentration, psychomotor changes, denies thoughts of being better off dead   Carlota describes having problems concentrating, and finishing tasks. With regard to ADHD: I am presumptively treating patient for this issue; history as provided today, presentation today, and (children) likely-positive family history would suggest a distinct possibility this is a contributing factor to patient's dysfunction in caregiver roles and engagements irrespective to screening results. Patient has learned and successfully implemented compensatory coping skills that, with the [place]ybeterian " of layered stressors throughout adulthood, have begun to fail. Treating ADHD symptoms will likely be a concise and appropriate route to address anxiety and mood issues that are, at least to some degree, secondary to deficits related to traits of ADHD.     Psychiatric screening is negative for pathognomonic history of: TBI, violence, suicidality, psychosis, and buck.     I have counseled the patient with regard to diagnoses and the recommended treatment regimen as documented below: I will assume prescriptive responsibility for ADHD agent, I will be prescribing Adderall 10 mg  for ADHD. Patient has been made aware of the long-term risks of tachyphylaxis/dependence and uncomfortable withdrawal that may occur with abrupt discontinuation of this agent. I have advised taking medication holidays to mitigate risk of dependence and tolerance and have advised the patient with regard to common psychological dependence that can contribute to perceived diminishment in treatment effectiveness. Patient has been advised to monitor and limit caffeine intake while taking this agent. Patient has been made aware of common SE - diminished appetite, insomnia, hypertension - for which this agent has propensity. I have specifically reviewed issues related to misuse and diversion with the patient today. and other medications. Patient acknowledges the diagnoses per my rendered interpretation. Patient demonstrates awareness/understanding of viable alternatives for treatment as well as potential risks, benefits, and side effects associated with this regimen and is amenable to proceed in this fashion.     Recommended lifestyle changes: 30 minutes of activity to increase HR 2-3 days weekly.    Psychiatric History:  Diagnoses: depression, and anxiety  Outpatient history: therapist  Inpatient history: none  Medication trials: aripiprazole, duloxetine, clonidine   Other treatment modalities: Psychotherapy  Self harm: No history  Suicide attempts:  No  Abuse or neglect: past marriage    Substance Abuse History:   Types/methods/frequency: *none  Transtheoretical stage: none    Social History:  Residence: lives house,  and two children, dog and lizard  Vocation: no  Source of income: Social Security  Last grade completed: college  Pertinent developmental history: presumptive ADHD  Pertinent legal history: No history   Hobbies/interests: crafting, volunteer, work, bowling  Amish:spiritual   Exercise: not as much as I should  Dietary habits: no pertinent issues   Sleep hygiene: c-pap  Social habits: no pertinent issues   Sunlight: There are no concern for under-exposure.  Caffeine intake: no pertinent issues   Hydration habits: no pertinent issues    history: No    Social History     Socioeconomic History    Marital status:    Tobacco Use    Smoking status: Never     Passive exposure: Never    Smokeless tobacco: Never   Vaping Use    Vaping status: Never Used   Substance and Sexual Activity    Alcohol use: Never    Drug use: Never    Sexual activity: Yes     Partners: Female     Birth control/protection: Hysterectomy     Access to Firearms: yes,  and son secured    Tobacco use counseling/intervention: N/A,  PHQ-9 Depression Screening  PHQ-9 Total Score: 11    Little interest or pleasure in doing things? 1-->several days   Feeling down, depressed, or hopeless? 1-->several days   Trouble falling or staying asleep, or sleeping too much? 1-->several days   Feeling tired or having little energy? 3-->nearly every day   Poor appetite or overeating? 1-->several days   Feeling bad about yourself - or that you are a failure or have let yourself or your family down? 1-->several days   Trouble concentrating on things, such as reading the newspaper or watching television? 2-->more than half the days   Moving or speaking so slowly that other people could have noticed? Or the opposite - being so fidgety or restless that you have been moving around  a lot more than usual? 1-->several days   Thoughts that you would be better off dead, or of hurting yourself in some way? 0-->not at all   PHQ-9 Total Score 11     MABLE-7  Feeling nervous, anxious or on edge: Several days  Not being able to stop or control worrying: More than half the days  Worrying too much about different things: More than half the days  Trouble Relaxing: Several days  Being so restless that it is hard to sit still: Several days  Feeling afraid as if something awful might happen: More than half the days  Becoming easily annoyed or irritable: Several days  MABLE 7 Total Score: 10  If you checked any problems, how difficult have these problems made it for you to do your work, take care of things at home, or get along with other people: Somewhat difficult    Problem List:  Patient Active Problem List   Diagnosis    Hypothyroidism    Type 2 diabetes mellitus    Vitamin B 12 deficiency    Vitamin D deficiency    Hyperlipidemia    Morbid (severe) obesity due to excess calories    Hypertension    Anemia    Anxiety    DANY (obstructive sleep apnea)    Bundle branch block, right    Allergies    GERD (gastroesophageal reflux disease)    Fatigue    Depression    Acne    Macromastia    Migraine    Urinary incontinence    Sinus trouble    Insomnia    Fibromyalgia    Inguinal hernia    Mild recurrent major depression    Seasonal allergic rhinitis    Stomach pain    Stress    OAB (overactive bladder)    Postoperative follow-up    Encounter for long-term (current) use of insulin    Post-menopausal    Overweight    Chronic pain    Migraine without aura and without status migrainosus, not intractable     Allergy:   Allergies   Allergen Reactions    Pravastatin Sodium Nausea Only    Hydrocodone-Acetaminophen Hallucinations     OK TO TAKE REGULAR TYLENOL     Lisinopril Cough    Pollen Extract Other (See Comments)     CONGESTION, WATERY EYES, SNEEZING         Discontinued Medications:  There are no discontinued  medications.    Current Medications:   Current Outpatient Medications   Medication Sig Dispense Refill    albuterol (ProAir RespiClick) 108 (90 Base) MCG/ACT inhaler Inhale 2 puffs Every 6 (Six) Hours As Needed for Wheezing or Shortness of Air. 18 g 2    amLODIPine (NORVASC) 10 MG tablet Take 1 tablet by mouth Daily. 30 tablet 5    ARIPiprazole (ABILIFY) 5 MG tablet Take 1.5 tablets by mouth Daily. 45 tablet 5    Blood Glucose Monitoring Suppl (FreeStyle Lite) w/Device kit       buPROPion XL (WELLBUTRIN XL) 150 MG 24 hr tablet Take 1 tablet by mouth Daily.      Calcium Carbonate 1500 (600 Ca) MG tablet Take 1 tablet by mouth Daily. 30 tablet 5    cetirizine (zyrTEC) 10 MG tablet Take 1 tablet by mouth Daily.      cimetidine (TAGAMET) 200 MG tablet Take 1 tablet by mouth 2 (Two) Times a Day Before Meals. 60 tablet 5    cloNIDine (Catapres) 0.1 MG tablet Take 1 tablet by mouth Every Night. 30 tablet 0    Continuous Blood Gluc Sensor (FreeStyle Harley 3 Sensor) [facility] Use 1 each Every 14 (Fourteen) Days. 2 each 5    cyanocobalamin (VITAMIN B-12) 1000 MCG tablet Vitamin B-12 1,000 mcg oral tablet take 1 tablet by oral route daily   Active      DULoxetine (CYMBALTA) 60 MG capsule Take 1 capsule by mouth Daily. 30 capsule 5    ezetimibe (Zetia) 10 MG tablet Take 1 tablet by mouth Daily. 30 tablet 5    FREESTYLE LITE test strip 1 each by Other route Daily for 180 days. Use as instructed 90 each 1    gabapentin (NEURONTIN) 100 MG capsule Take 1 capsule twice a day by oral route for 30 days.      gabapentin (NEURONTIN) 300 MG capsule Take 400 mg by mouth Daily.      hydroCHLOROthiazide 12.5 MG tablet Take 1 tablet by mouth Daily. 30 tablet 5    Insulin Glargine, 1 Unit Dial, (Toujeo SoloStar) 300 UNIT/ML solution pen-injector injection Inject 70 Units under the skin into the appropriate area as directed 2 (Two) Times a Day for 180 days. 42 mL 1    Insulin Lispro, 1 Unit Dial, (HumaLOG KwikPen) 100 UNIT/ML solution pen-injector  Per sliding scale tid with meals. Max daily dose of 40 units 35 mL 1    Insulin Pen Needle (Pen Needles) 32G X 4 MM misc Use 1 each 5 (Five) Times a Day. 450 each 1    iron polysaccharides (Ferrex 150) 150 MG capsule Take 1 capsule by mouth Daily. 90 capsule 1    levothyroxine (SYNTHROID, LEVOTHROID) 25 MCG tablet Take 1 tablet by mouth Daily. 30 tablet 5    lisinopril (PRINIVIL,ZESTRIL) 5 MG tablet Take 1 tablet by mouth Daily.      losartan (COZAAR) 25 MG tablet Take 1 tablet by mouth 2 (Two) Times a Day. 60 tablet 0    meloxicam (MOBIC) 7.5 MG tablet Take 1 tablet by mouth Daily.      montelukast (SINGULAIR) 10 MG tablet Take 1 tablet by mouth Every Evening. 30 tablet 5    ondansetron (Zofran) 4 MG tablet Take 1 tablet by mouth Every 8 (Eight) Hours As Needed for Nausea or Vomiting. 30 tablet 0    pantoprazole (PROTONIX) 40 MG EC tablet Take 1 tablet by mouth Daily. 30 tablet 5    promethazine (PHENERGAN) 12.5 MG tablet Take 1 tablet by mouth Every 6 (Six) Hours As Needed for Nausea or Vomiting. 20 tablet 0    saccharomyces boulardii (FLORASTOR) 250 MG capsule Take 1 capsule by mouth 2 (Two) Times a Day.      solifenacin (VESICARE) 5 MG tablet Vesicare 5 mg oral tablet take 1 tablet (5 mg) by oral route once daily   Suspended      Tirzepatide (Mounjaro) 12.5 MG/0.5ML solution pen-injector pen Inject 0.5 mL under the skin into the appropriate area as directed Every 7 (Seven) Days. 2 mL 0    topiramate (TOPAMAX) 50 MG tablet       traZODone (DESYREL) 50 MG tablet Take 1 tablet by mouth Every Night. 30 tablet 5    vitamin D (ERGOCALCIFEROL) 1.25 MG (81338 UT) capsule capsule Take 1 capsule by mouth 1 (One) Time Per Week. 5 capsule 5    Vitamin E 90 MG (200 UNIT) capsule Take 1 capsule by mouth Daily. 30 capsule 5     No current facility-administered medications for this visit.     Past Medical History:  Past Medical History:   Diagnosis Date    Acne     Allergic     Allergic rhinitis due to allergen 04/15/2021     Allergies     Anemia 2020    Anesthesia     HARD TO WAKE UP POSTOP    Anxiety 04/15/2021    Asthma     INHALERS PRN    Bundle branch block, right     SEE'S DR BENNETT IN 2019, NOW SEES ON AS NEEDED BASIS, MAINLY FOLLOW PCP ROUTINELY. DENIED CP/SOB    Depression     Diabetes     Diabetes mellitus, type 2 2020    DOESN'T CHECK BG AT HOME    Essential hypertension 2020    Fatigue 04/15/2021    Fibromyalgia     GERD (gastroesophageal reflux disease) 2020    High cholesterol     Hyperlipidemia 2020    Hypothyroidism 2020    Inguinal hernia     Insomnia     Macromastia     Migraine     Obesity 2020    DANY (obstructive sleep apnea) 2020    Palpitation     NO PROBLEMS CURRENTLY. FOLLOWS PCP    Right bundle branch block 2020    SEE'S DR BLUE ON AS NEEDED BASIS, SEE'S PCP ON YEARLY. DENIED CP/SOB    RLS (restless legs syndrome)     Seasonal allergies     Sinus trouble     Stomach pain 2016    Stress 2016    Thyroid disease     Urinary incontinence     Vitamin B 12 deficiency     Vitamin D deficiency 2020     Past Surgical History:  Past Surgical History:   Procedure Laterality Date    BILATERAL BREAST REDUCTION Bilateral 2022    Procedure: BREAST REDUCTION;  Surgeon: Zeeshan Bone MD;  Location: [facility] [place] [facility] [place];  Service: Plastics;  Laterality: Bilateral;    BREAST BIOPSY Right      SECTION      CHOLECYSTECTOMY      COLONOSCOPY      CYSTOSCOPY BOTOX INJECTION OF BLADDER  2019    ENDOSCOPY      HERNIA REPAIR      HYSTERECTOMY  2017    WISDOM TOOTH EXTRACTION       Family History:   Family History   Problem Relation Age of Onset    Stroke Mother     Diabetes Mother     Restless legs syndrome Mother     Hyperlipidemia Mother     Nephrolithiasis Father     Sleep apnea Father     Depression Father     Restless legs syndrome Sister     Colon cancer Maternal Grandmother         50S    Colon cancer Paternal Grandfather         70S    Diabetes  "Maternal Uncle     Breast cancer Other         20S    Diabetes Other        Mental Status Exam:   Observations:  Appearance: Neat  Speech: Normal  Eye Contact: Normal  Motor Activity: Normal  Affect:Full  Comments:  Mood:Mood: Euthymic  Cognition  Orientation Impairment: None  Memory Impairment: None  Attention: Normal  Comments:  Perception  Hallucinations:None  Other:   Comments:  Thoughts:  Suicidality:None  Homicidality:None  Delusions:  None  Comments:  Behavior:Behavior: Guarded  Insight: Insight: Good  Judgement:Insight: Good    Vital Signs:   /89   Pulse 95   Ht 152.4 cm (60\")   Wt 97.5 kg (215 lb)   BMI 41.99 kg/m²    Lab Results:   Office Visit on 05/23/2024   Component Date Value Ref Range Status    Glucose 05/23/2024 303 (H)  70 - 99 mg/dL Final    Serial Number: [id number]:  [id number]   Office Visit on 05/15/2024   Component Date Value Ref Range Status    Glucose 05/15/2024 249 (H)  65 - 99 mg/dL Final    BUN 05/15/2024 10  6 - 20 mg/dL Final    Creatinine 05/15/2024 0.72  0.57 - 1.00 mg/dL Final    Sodium 05/15/2024 138  136 - 145 mmol/L Final    Potassium 05/15/2024 4.4  3.5 - 5.2 mmol/L Final    Chloride 05/15/2024 102  98 - 107 mmol/L Final    CO2 05/15/2024 26.1  22.0 - 29.0 mmol/L Final    Calcium 05/15/2024 9.3  8.6 - 10.5 mg/dL Final    Total Protein 05/15/2024 6.7  6.0 - 8.5 g/dL Final    Albumin 05/15/2024 3.9  3.5 - 5.2 g/dL Final    ALT (SGPT) 05/15/2024 25  1 - 33 U/L Final    AST (SGOT) 05/15/2024 18  1 - 32 U/L Final    Alkaline Phosphatase 05/15/2024 126 (H)  39 - 117 U/L Final    Total Bilirubin 05/15/2024 <0.2  0.0 - 1.2 mg/dL Final    Globulin 05/15/2024 2.8  gm/dL Final    A/G Ratio 05/15/2024 1.4  g/dL Final    BUN/Creatinine Ratio 05/15/2024 13.9  7.0 - 25.0 Final    Anion Gap 05/15/2024 9.9  5.0 - 15.0 mmol/L Final    eGFR 05/15/2024 99.5  >60.0 mL/min/1.73 Final    TSH 05/15/2024 3.580  0.270 - 4.200 uIU/mL Final    Total Cholesterol 05/15/2024 208 (H)  0 - 200 " mg/dL Final    Triglycerides 05/15/2024 346 (H)  0 - 150 mg/dL Final    HDL Cholesterol 05/15/2024 33 (L)  40 - 60 mg/dL Final    LDL Cholesterol  05/15/2024 115 (H)  0 - 100 mg/dL Final    VLDL Cholesterol 05/15/2024 60 (H)  5 - 40 mg/dL Final    LDL/HDL Ratio 05/15/2024 3.21   Final    25 Hydroxy, Vitamin D 05/15/2024 48.4  30.0 - 100.0 ng/ml Final    Free T4 05/15/2024 0.98  0.93 - 1.70 ng/dL Final    T3, Free 05/15/2024 3.04  2.00 - 4.40 pg/mL Final    Ferritin 05/15/2024 156.00 (H)  13.00 - 150.00 ng/mL Final    Iron 05/15/2024 55  37 - 145 mcg/dL Final    Iron Saturation (TSAT) 05/15/2024 14 (L)  20 - 50 % Final    Transferrin 05/15/2024 263  200 - 360 mg/dL Final    TIBC 05/15/2024 392  298 - 536 mcg/dL Final    WBC 05/15/2024 6.21  3.40 - 10.80 10*3/mm3 Final    RBC 05/15/2024 4.88  3.77 - 5.28 10*6/mm3 Final    Hemoglobin 05/15/2024 13.9  12.0 - 15.9 g/dL Final    Hematocrit 05/15/2024 41.5  34.0 - 46.6 % Final    MCV 05/15/2024 85.0  79.0 - 97.0 fL Final    MCH 05/15/2024 28.5  26.6 - 33.0 pg Final    MCHC 05/15/2024 33.5  31.5 - 35.7 g/dL Final    RDW 05/15/2024 13.4  12.3 - 15.4 % Final    RDW-SD 05/15/2024 41.1  37.0 - 54.0 fl Final    MPV 05/15/2024 10.3  6.0 - 12.0 fL Final    Platelets 05/15/2024 295  140 - 450 10*3/mm3 Final    Neutrophil % 05/15/2024 45.7  42.7 - 76.0 % Final    Lymphocyte % 05/15/2024 39.5  19.6 - 45.3 % Final    Monocyte % 05/15/2024 9.3  5.0 - 12.0 % Final    Eosinophil % 05/15/2024 4.0  0.3 - 6.2 % Final    Basophil % 05/15/2024 1.0  0.0 - 1.5 % Final    Immature Grans % 05/15/2024 0.5  0.0 - 0.5 % Final    Neutrophils, Absolute 05/15/2024 2.84  1.70 - 7.00 10*3/mm3 Final    Lymphocytes, Absolute 05/15/2024 2.45  0.70 - 3.10 10*3/mm3 Final    Monocytes, Absolute 05/15/2024 0.58  0.10 - 0.90 10*3/mm3 Final    Eosinophils, Absolute 05/15/2024 0.25  0.00 - 0.40 10*3/mm3 Final    Basophils, Absolute 05/15/2024 0.06  0.00 - 0.20 10*3/mm3 Final    Immature Grans, Absolute 05/15/2024  0.03  0.00 - 0.05 10*3/mm3 Final    nRBC 05/15/2024 0.0  0.0 - 0.2 /100 WBC Final   Office Visit on 04/11/2024   Component Date Value Ref Range Status    Glucose 04/11/2024 212 (H)  70 - 99 mg/dL Final    Serial Number: [id number]:  [id number]    Hemoglobin A1C 04/11/2024 9.0 (A)  4.5 - 5.7 % Final    Lot Number 04/11/2024 [id number]   Final    Expiration Date 04/11/2024 12-   Final   Procedure visit on 03/01/2024   Component Date Value Ref Range Status    Color 03/01/2024 Yellow  Yellow, Straw, Dark Yellow, Nahomi Final    Clarity, UA 03/01/2024 Clear  Clear Final    Specific Gravity  03/01/2024 1.015  1.005 - 1.030 Final    pH, Urine 03/01/2024 7.0  5.0 - 8.0 Final    Leukocytes 03/01/2024 Negative  Negative Final    Nitrite, UA 03/01/2024 Negative  Negative Final    Protein, POC 03/01/2024 Negative  Negative mg/dL Final    Glucose, UA 03/01/2024 >=1000 mg/dL (3+) (A)  Negative mg/dL Final    Ketones, UA 03/01/2024 Negative  Negative Final    Urobilinogen, UA 03/01/2024 0.2 E.U./dL  Normal, 0.2 E.U./dL Final    Bilirubin 03/01/2024 Negative  Negative Final    Blood, UA 03/01/2024 Negative  Negative Final    Lot Number 03/01/2024 [id number]   Final    Expiration Date 03/01/2024 122,024   Final   Lab on 02/22/2024   Component Date Value Ref Range Status    Urine Culture 02/22/2024 No growth   Final   Office Visit on 02/22/2024   Component Date Value Ref Range Status    Glucose 02/22/2024 266 (H)  70 - 99 mg/dL Final    Serial Number: [id number]:  [id number]   Office Visit on 01/23/2024   Component Date Value Ref Range Status    Glucose 01/23/2024 245 (H)  70 - 99 mg/dL Final    Serial Number: [id number]:  [id number]    Hemoglobin A1C 01/23/2024 9.7 (A)  4.5 - 5.7 % Final    Lot Number 01/23/2024 [id number]   Final    Expiration Date 01/23/2024 9-24-25   Final   Office Visit on 12/19/2023   Component Date Value Ref Range Status    Glucose 12/19/2023 216 (H)  70 - 99 mg/dL Final    Serial Number:  [id number]:  [id number]       ASSESSMENT AND PLAN:    ICD-10-CM ICD-9-CM   1. Psychophysiological insomnia  F51.04 307.42   2. Adult ADHD  F90.9 314.01   3. MDD (major depressive disorder), recurrent episode, mild  F33.0 296.31   4. Generalized anxiety disorder  F41.1 300.02       Carlota who presents today for initial evaluation regarding medication management . We have discussed the history and interpreted diagnoses as above as well as the treatment plan below, including potential R/B/SE of the recommended regimen of which the patient demonstrates understanding. Patient is agreeable to call 911 or go to the nearest ER should she become concerned for her own safety and/or the safety of those around her. There are not overt indices of acute bcuk/psychosis on evaluation today.     Medication regimen: ADD ADHD agent after UDS results, likely Adderall 10 mg po q AM; continue aripiprazole 7.5 mg p.o. daily continue bupropion  mg p.o. daily continue clonidine 0.1 mg p.o. daily continue duloxetine 60 mg p.o. daily,  patient is advised not to misuse prescribed medications or to use any exogenous substances that aren't disclosed to this provider as they may interact with the regimen to her detriment.   Risk Assessment: protracted risk is moderate, imminent risk is moderate/ low.  Risk factors include:  anxiety disorder, mood disorder, and recent/ongoing psychosocial stressors. Protective factors include: no known family history of suicidality, intact reality testing, no substance use disorder, no present SI, no stated history of suicide attempts or self-harm, patient's exhibited future-orientation, strong social support, and patient's cooperation with care. Do note that this is subject to change with the [employer]alism of new stressors, treatment non-adherence, use of substances, and/or new medical ails.  Monitoring: reviewed labs/imaging as populated above, UDS ordered; PHQ-9 today is PHQ-9 Total Score: 11 /27, MABLE-7  today is 11/21  Therapy: Sees outside provider  Follow-up: one month  Communications: N/A    TREATMENT PLAN/GOALS: challenge patterns of living conducive to symptom burden, implement recommended regimen as above with augmentative, intermittent supportive psychotherapy to reduce symptom burden. Patient acknowledged and verbally consented to begin treatment as above. The importance of adherence to the recommended treatment and interval follow-up appointments was emphasized today. Patient was today advised to limit daily caffeine intake, hydrate appropriately, eat healthy and nutritious foods, engage sleep hygiene measures, engage appropriate exposure to sunlight, engage with hobbies in balance with life necessities, and exercise appropriate to their capacity to do so.     Billing: I have seen the patient today and considered her psychiatric complaints, rendered a diagnosis, and discussed treatment with the patient as above with which she consents.    Parts of this note are electronic transcriptions/translations of spoken language to printed text using the Dragon Dictation system.    Electronically signed by RAHEEM Young, 05/28/24,

## 2024-05-28 NOTE — TREATMENT PLAN
Multi-Disciplinary Problems (from Behavioral Health Treatment Plan)      Active Problems       Problem: Anxiety  Start Date: 05/28/24      Problem Details: The patient self-scales this problem as a 2 with 10 being the worst.          Goal Priority Start Date Expected End Date End Date    Patient will develop and implement behavioral and cognitive strategies to reduce anxiety and irrational fears. -- 05/28/24 -- --    Goal Details: Progress toward goal:  The patient self-scales their progress related to this goal as a 3 with 10 being the worst.          Goal Intervention Frequency Start Date End Date    Help patient explore past emotional issues in relation to present anxiety. Weekly 05/28/24 --    Intervention Details: Duration of treatment until until remission of symptoms.          Goal Intervention Frequency Start Date End Date    Help patient develop an awareness of their cognitive and physical responses to anxiety. Weekly 05/28/24 --    Intervention Details: Duration of treatment until until remission of symptoms.                          Reviewed By       Maliha Luna APRN 05/28/24 [id number]                     I have discussed and reviewed this treatment plan with the patient.  It has been printed for signatures.

## 2024-06-06 DIAGNOSIS — G47.09 OTHER INSOMNIA: ICD-10-CM

## 2024-06-06 RX ORDER — TRAZODONE HYDROCHLORIDE 50 MG/1
50 TABLET ORAL NIGHTLY
Qty: 30 TABLET | Refills: 5 | Status: SHIPPED | OUTPATIENT
Start: 2024-06-06

## 2024-06-06 RX ORDER — MELOXICAM 7.5 MG/1
7.5 TABLET ORAL DAILY
Qty: 30 TABLET | Refills: 0 | Status: SHIPPED | OUTPATIENT
Start: 2024-06-06

## 2024-06-13 ENCOUNTER — HOSPITAL ENCOUNTER (OUTPATIENT)
Dept: BONE DENSITY | Facility: HOSPITAL | Age: 54
Discharge: HOME | End: 2024-06-13
Admitting: NURSE PRACTITIONER
Payer: COMMERCIAL

## 2024-06-13 DIAGNOSIS — Z78.0 POSTMENOPAUSE: ICD-10-CM

## 2024-06-13 PROCEDURE — 77080 DXA BONE DENSITY AXIAL: CPT

## 2024-06-17 RX ORDER — CLONIDINE HYDROCHLORIDE 0.1 MG/1
0.1 TABLET ORAL NIGHTLY
Qty: 30 TABLET | Refills: 5 | Status: SHIPPED | OUTPATIENT
Start: 2024-06-17

## 2024-06-26 NOTE — PROGRESS NOTES
"[facility]  Follow-up Visit    Chief Complaint:  \"I am wondering if I have ADHD\"     Initial History: Carlota Robin is a 53 y.o. female who presents today for initial evaluation regarding screening for adult ADHD. Carlota presents unaccompanied in no acute distress and engages with me appropriately. Psychotropic regimen with which patient presents is described as aripiprazole 7.5 mg po q day, bupropion  mg po q day, clonidine 0.1 mg p.o. nightly duloxetine 60 mg p.o. daily.   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  Patient's  had a heart transplant and she provides medication management, transportation, and emotional support.  Patient states that her mood is stable on her current medication regimen. Her moods improve in tandem as her 's recovery milestones are being met. She also voices she has a strong support system should she need respite.   History is positive for signs/symptoms suggestive of consistent and excessive worry across several domains of life that contributes to tension and irritability throughout the day. History is positive for MDD, recurrent, mild, low mood, low energy, anhedonia, changes in sleep, changes in appetite, guilt, poor concentration, psychomotor changes, denies thoughts of being better off dead   Carlota describes having problems concentrating, and finishing tasks. With regard to ADHD: I am presumptively treating patient for this issue; history as provided today, presentation today, and (children) likely-positive family history would suggest a distinct possibility this is a contributing factor to patient's dysfunction in caregiver roles and engagements irrespective to screening results. Patient has learned and successfully implemented compensatory coping skills that, with the Hindu of layered stressors throughout adulthood, have begun to fail. Treating ADHD symptoms will likely be a " concise and appropriate route to address anxiety and mood issues that are, at least to some degree, secondary to deficits related to traits of ADHD.      Psychiatric screening is negative for pathognomonic history of: TBI, violence, suicidality, psychosis, and buck.      I have counseled the patient with regard to diagnoses and the recommended treatment regimen as documented below: I will assume prescriptive responsibility for ADHD agent, I will be prescribing Adderall 10 mg  for ADHD. Patient has been made aware of the long-term risks of tachyphylaxis/dependence and uncomfortable withdrawal that may occur with abrupt discontinuation of this agent. I have advised taking medication holidays to mitigate risk of dependence and tolerance and have advised the patient with regard to common psychological dependence that can contribute to perceived diminishment in treatment effectiveness. Patient has been advised to monitor and limit caffeine intake while taking this agent. Patient has been made aware of common SE - diminished appetite, insomnia, hypertension - for which this agent has propensity. I have specifically reviewed issues related to misuse and diversion with the patient today. and other medications. Patient acknowledges the diagnoses per my rendered interpretation. Patient demonstrates awareness/understanding of viable alternatives for treatment as well as potential risks, benefits, and side effects associated with this regimen and is amenable to proceed in this fashion.      Recommended lifestyle changes: 30 minutes of activity to increase HR 2-3 days weekly.     Psychiatric History:  Diagnoses: depression, and anxiety  Outpatient history: therapist  Inpatient history: none  Medication trials: aripiprazole, duloxetine, clonidine   Other treatment modalities: Psychotherapy  Self harm: No history  Suicide attempts: No  Abuse or neglect: past marriage     Substance Abuse History:   Types/methods/frequency:  *none  Transtheoretical stage: none     Social History:  Residence: lives house,  and two children, dog and lizard  Vocation: no  Source of income: Social Security  Last grade completed: college  Pertinent developmental history: presumptive ADHD  Pertinent legal history: No history   Hobbies/interests: crafting, volunteer, work, bowling  Jew:spiritual   Exercise: not as much as I should  Dietary habits: no pertinent issues   Sleep hygiene: c-pap  Social habits: no pertinent issues   Sunlight: There are no concern for under-exposure.  Caffeine intake: no pertinent issues   Hydration habits: no pertinent issues    history: No            Interval History Carlota is a 53 y.o. female who presents today for follow-up. Carlota presents unaccompanied in no acute distress and engages with me appropriately.     Current treatment regimen includes:   - aripiprazole 5 mg po q day  -bupropion  mg q day  -clonidine 0.1 mg po q HS  -duloxetine 60 mg po q day  -trazodone 50 mg po q HS  Side-effects per given history: none.      Today the patient feels frustrated.  She desires to receive her ADHD medication.  She denies having any other issues related to mood, sleep or appetite thought process and content are devoid of overt aberration suggestive of acute buck/psychosis. The patient denies SI/HI/AVH. There are not changes on exam today compared to most recent evaluation.    - sleep: no concerns  - appetite: moderately controlled    I have counseled the patient with regard to diagnoses and the recommended treatment regimen as documented below. Patient acknowledges the diagnoses per my rendered interpretation. Patient demonstrates understanding of potential risks/benefits/side effects associated with this regimen and is amenable to proceed in this fashion.     Assignment: begin journaling instances of overwhelm, response thereto, ideal response to cultivate insight and begin breaking a pattern of  stimulus/response.      Social History     Socioeconomic History    Marital status:    Tobacco Use    Smoking status: Never     Passive exposure: Never    Smokeless tobacco: Never   Vaping Use    Vaping status: Never Used   Substance and Sexual Activity    Alcohol use: Never    Drug use: Never    Sexual activity: Yes     Partners: Female     Birth control/protection: Hysterectomy       Tobacco use counseling/intervention: patient does not use tobacco. Currently Precontemplation by transtheoretical model.     Problem List:  Patient Active Problem List   Diagnosis    Hypothyroidism    Type 2 diabetes mellitus    Vitamin B 12 deficiency    Vitamin D deficiency    Hyperlipidemia    Morbid (severe) obesity due to excess calories    Hypertension    Anemia    Anxiety    DANY (obstructive sleep apnea)    Bundle branch block, right    Allergies    GERD (gastroesophageal reflux disease)    Fatigue    Depression    Acne    Macromastia    Migraine    Urinary incontinence    Sinus trouble    Insomnia    Fibromyalgia    Inguinal hernia    Mild recurrent major depression    Seasonal allergic rhinitis    Stomach pain    Stress    OAB (overactive bladder)    Postoperative follow-up    Encounter for long-term (current) use of insulin    Post-menopausal    Overweight    Chronic pain    Migraine without aura and without status migrainosus, not intractable     Allergy:   Allergies   Allergen Reactions    Pravastatin Sodium Nausea Only    Hydrocodone-Acetaminophen Hallucinations     OK TO TAKE REGULAR TYLENOL     Lisinopril Cough    Pollen Extract Other (See Comments)     CONGESTION, WATERY EYES, SNEEZING         Discontinued Medications:  There are no discontinued medications.    Current Medications:   Current Outpatient Medications   Medication Sig Dispense Refill    albuterol (ProAir RespiClick) 108 (90 Base) MCG/ACT inhaler Inhale 2 puffs Every 6 (Six) Hours As Needed for Wheezing or Shortness of Air. 18 g 2    amLODIPine  (NORVASC) 10 MG tablet Take 1 tablet by mouth Daily. 30 tablet 5    ARIPiprazole (ABILIFY) 5 MG tablet Take 1.5 tablets by mouth Daily. 45 tablet 5    Blood Glucose Monitoring Suppl (FreeStyle Lite) w/Device kit       buPROPion XL (WELLBUTRIN XL) 150 MG 24 hr tablet Take 1 tablet by mouth Daily.      Calcium Carbonate 1500 (600 Ca) MG tablet Take 1 tablet by mouth Daily. 30 tablet 5    cetirizine (zyrTEC) 10 MG tablet Take 1 tablet by mouth Daily.      cimetidine (TAGAMET) 200 MG tablet Take 1 tablet by mouth 2 (Two) Times a Day Before Meals. 60 tablet 5    cloNIDine (CATAPRES) 0.1 MG tablet Take 1 tablet by mouth every night. 30 tablet 5    Continuous Blood Gluc Sensor (FreeStyle Harley 3 Sensor) misc Use 1 each Every 14 (Fourteen) Days. 2 each 5    cyanocobalamin (VITAMIN B-12) 1000 MCG tablet Vitamin B-12 1,000 mcg oral tablet take 1 tablet by oral route daily   Active      DULoxetine (CYMBALTA) 60 MG capsule Take 1 capsule by mouth Daily. 30 capsule 5    ezetimibe (Zetia) 10 MG tablet Take 1 tablet by mouth Daily. 30 tablet 5    gabapentin (NEURONTIN) 100 MG capsule Take 1 capsule by mouth 2 (Two) Times a Day As Needed (AS NEEDED FOR PAIN). 100MG TWICE A DAY, 400MG AT BEDTIME      gabapentin (NEURONTIN) 400 MG capsule Take 1 capsule by mouth Daily. 400MG AT BEDTIME, 100MG TWICE PER DAY      hydroCHLOROthiazide 12.5 MG tablet Take 1 tablet by mouth Daily. 30 tablet 5    Insulin Glargine, 1 Unit Dial, (Toujeo SoloStar) 300 UNIT/ML solution pen-injector injection Inject 70 Units under the skin into the appropriate area as directed 2 (Two) Times a Day for 180 days. 42 mL 1    Insulin Lispro, 1 Unit Dial, (HumaLOG KwikPen) 100 UNIT/ML solution pen-injector Per sliding scale tid with meals. Max daily dose of 40 units 35 mL 1    Insulin Pen Needle (Pen Needles) 32G X 4 MM misc Use 1 each 5 (Five) Times a Day. 450 each 1    iron polysaccharides (Ferrex 150) 150 MG capsule Take 1 capsule by mouth Daily. 90 capsule 1     levothyroxine (SYNTHROID, LEVOTHROID) 25 MCG tablet Take 1 tablet by mouth Daily. 30 tablet 5    lisinopril (PRINIVIL,ZESTRIL) 5 MG tablet Take 1 tablet by mouth Daily.      losartan (COZAAR) 25 MG tablet Take 1 tablet by mouth 2 (Two) Times a Day. 60 tablet 0    meloxicam (MOBIC) 7.5 MG tablet Take 1 tablet by mouth daily. 30 tablet 0    montelukast (SINGULAIR) 10 MG tablet Take 1 tablet by mouth Every Evening. 30 tablet 5    ondansetron (Zofran) 4 MG tablet Take 1 tablet by mouth Every 8 (Eight) Hours As Needed for Nausea or Vomiting. 30 tablet 0    pantoprazole (PROTONIX) 40 MG EC tablet Take 1 tablet by mouth Daily. 30 tablet 5    promethazine (PHENERGAN) 12.5 MG tablet Take 1 tablet by mouth Every 6 (Six) Hours As Needed for Nausea or Vomiting. 20 tablet 0    saccharomyces boulardii (FLORASTOR) 250 MG capsule Take 1 capsule by mouth 2 (Two) Times a Day.      solifenacin (VESICARE) 5 MG tablet Vesicare 5 mg oral tablet take 1 tablet (5 mg) by oral route once daily   Suspended      Tirzepatide (Mounjaro) 12.5 MG/0.5ML solution pen-injector pen Inject 0.5 mL under the skin into the appropriate area as directed Every 7 (Seven) Days. 2 mL 0    topiramate (TOPAMAX) 50 MG tablet       traZODone (DESYREL) 50 MG tablet Take 1 tablet by mouth every night. 30 tablet 5    vitamin D (ERGOCALCIFEROL) 1.25 MG (02610 UT) capsule capsule Take 1 capsule by mouth 1 (One) Time Per Week. 5 capsule 5    Vitamin E 90 MG (200 UNIT) capsule Take 1 capsule by mouth Daily. 30 capsule 5     No current facility-administered medications for this visit.     Past Medical History:  Past Medical History:   Diagnosis Date    Acne     Allergic     Allergic rhinitis due to allergen 04/15/2021    Allergies     Anemia 12/03/2020    Anesthesia     HARD TO WAKE UP POSTOP    Anxiety 04/15/2021    Asthma     INHALERS PRN    Bundle branch block, right     SEE'S DR BENNETT IN 2019, NOW SEES ON AS NEEDED BASIS, MAINLY FOLLOW PCP ROUTINELY. DENIED CP/SOB     Depression     Diabetes     Diabetes mellitus, type 2 2020    DOESN'T CHECK BG AT HOME    Essential hypertension 2020    Fatigue 04/15/2021    Fibromyalgia     GERD (gastroesophageal reflux disease) 2020    High cholesterol     Hyperlipidemia 2020    Hypothyroidism 2020    Inguinal hernia     Insomnia     Macromastia     Migraine     Obesity 2020    DANY (obstructive sleep apnea) 2020    Palpitation     NO PROBLEMS CURRENTLY. FOLLOWS PCP    Right bundle branch block 2020    SEE'S DR BLUE ON AS NEEDED BASIS, SEE'S PCP ON YEARLY. DENIED CP/SOB    RLS (restless legs syndrome)     Seasonal allergies     Sinus trouble     Stomach pain 2016    Stress 2016    Thyroid disease     Urinary incontinence     Vitamin B 12 deficiency     Vitamin D deficiency 2020     Past Surgical History:  Past Surgical History:   Procedure Laterality Date    BILATERAL BREAST REDUCTION Bilateral 2022    Procedure: BREAST REDUCTION;  Surgeon: Zeeshan Bone MD;  Location: [facility] [place];  Service: Plastics;  Laterality: Bilateral;    BREAST BIOPSY Right      SECTION      CHOLECYSTECTOMY      COLONOSCOPY      CYSTOSCOPY BOTOX INJECTION OF BLADDER  2019    ENDOSCOPY      HERNIA REPAIR      HYSTERECTOMY  2017    WISDOM TOOTH EXTRACTION         MENTAL STATUS EXAM   General Appearance:  Cleanly groomed and dressed  Eye Contact:  Good eye contact  Attitude:  Cooperative and candid  Motor Activity:  Normal gait, posture  Muscle Strength:  Normal  Speech:  Normal rate, tone, volume  Language:  Spontaneous  Mood and affect:  Normal, pleasant  Thought Process:  Logical  Associations/ Thought Content:  No delusions  Hallucinations:  None  Suicidal Ideations:  Not present  Homicidal Ideation:  Not present  Sensorium:  Alert and clear  Orientation:  Person, place, time and situation  Immediate Recall, Recent, and Remote Memory:  Intact  Attention Span/ Concentration:   "Good  Fund of Knowledge:  Appropriate for age and educational level  Intellectual Functioning:  Above average  Insight:  Good  Judgement:  Good  Reliability:  Good  Impulse Control:  Good      Vital Signs:   /84   Pulse 88   Ht 152.4 cm (60\")   Wt 98.7 kg (217 lb 9.6 oz)   BMI 42.50 kg/m²    Lab Results:   Lab on 05/28/2024   Component Date Value Ref Range Status    Amphet/Methamphet, Screen 05/28/2024 Negative  Negative Final    Barbiturates Screen, Urine 05/28/2024 Negative  Negative Final    Benzodiazepine Screen, Urine 05/28/2024 Negative  Negative Final    Cocaine Screen, Urine 05/28/2024 Negative  Negative Final    Opiate Screen 05/28/2024 Negative  Negative Final    THC, Screen, Urine 05/28/2024 Negative  Negative Final    Methadone Screen, Urine 05/28/2024 Negative  Negative Final    Oxycodone Screen, Urine 05/28/2024 Negative  Negative Final    Fentanyl, Urine 05/28/2024 Negative  Negative Final   Office Visit on 05/23/2024   Component Date Value Ref Range Status    Glucose 05/23/2024 303 (H)  70 - 99 mg/dL Final    Serial Number: [id number]:  [id number]   Office Visit on 05/15/2024   Component Date Value Ref Range Status    Glucose 05/15/2024 249 (H)  65 - 99 mg/dL Final    BUN 05/15/2024 10  6 - 20 mg/dL Final    Creatinine 05/15/2024 0.72  0.57 - 1.00 mg/dL Final    Sodium 05/15/2024 138  136 - 145 mmol/L Final    Potassium 05/15/2024 4.4  3.5 - 5.2 mmol/L Final    Chloride 05/15/2024 102  98 - 107 mmol/L Final    CO2 05/15/2024 26.1  22.0 - 29.0 mmol/L Final    Calcium 05/15/2024 9.3  8.6 - 10.5 mg/dL Final    Total Protein 05/15/2024 6.7  6.0 - 8.5 g/dL Final    Albumin 05/15/2024 3.9  3.5 - 5.2 g/dL Final    ALT (SGPT) 05/15/2024 25  1 - 33 U/L Final    AST (SGOT) 05/15/2024 18  1 - 32 U/L Final    Alkaline Phosphatase 05/15/2024 126 (H)  39 - 117 U/L Final    Total Bilirubin 05/15/2024 <0.2  0.0 - 1.2 mg/dL Final    Globulin 05/15/2024 2.8  gm/dL Final    A/G Ratio 05/15/2024 1.4  " g/dL Final    BUN/Creatinine Ratio 05/15/2024 13.9  7.0 - 25.0 Final    Anion Gap 05/15/2024 9.9  5.0 - 15.0 mmol/L Final    eGFR 05/15/2024 99.5  >60.0 mL/min/1.73 Final    TSH 05/15/2024 3.580  0.270 - 4.200 uIU/mL Final    Total Cholesterol 05/15/2024 208 (H)  0 - 200 mg/dL Final    Triglycerides 05/15/2024 346 (H)  0 - 150 mg/dL Final    HDL Cholesterol 05/15/2024 33 (L)  40 - 60 mg/dL Final    LDL Cholesterol  05/15/2024 115 (H)  0 - 100 mg/dL Final    VLDL Cholesterol 05/15/2024 60 (H)  5 - 40 mg/dL Final    LDL/HDL Ratio 05/15/2024 3.21   Final    25 Hydroxy, Vitamin D 05/15/2024 48.4  30.0 - 100.0 ng/ml Final    Free T4 05/15/2024 0.98  0.93 - 1.70 ng/dL Final    T3, Free 05/15/2024 3.04  2.00 - 4.40 pg/mL Final    Ferritin 05/15/2024 156.00 (H)  13.00 - 150.00 ng/mL Final    Iron 05/15/2024 55  37 - 145 mcg/dL Final    Iron Saturation (TSAT) 05/15/2024 14 (L)  20 - 50 % Final    Transferrin 05/15/2024 263  200 - 360 mg/dL Final    TIBC 05/15/2024 392  298 - 536 mcg/dL Final    WBC 05/15/2024 6.21  3.40 - 10.80 10*3/mm3 Final    RBC 05/15/2024 4.88  3.77 - 5.28 10*6/mm3 Final    Hemoglobin 05/15/2024 13.9  12.0 - 15.9 g/dL Final    Hematocrit 05/15/2024 41.5  34.0 - 46.6 % Final    MCV 05/15/2024 85.0  79.0 - 97.0 fL Final    MCH 05/15/2024 28.5  26.6 - 33.0 pg Final    MCHC 05/15/2024 33.5  31.5 - 35.7 g/dL Final    RDW 05/15/2024 13.4  12.3 - 15.4 % Final    RDW-SD 05/15/2024 41.1  37.0 - 54.0 fl Final    MPV 05/15/2024 10.3  6.0 - 12.0 fL Final    Platelets 05/15/2024 295  140 - 450 10*3/mm3 Final    Neutrophil % 05/15/2024 45.7  42.7 - 76.0 % Final    Lymphocyte % 05/15/2024 39.5  19.6 - 45.3 % Final    Monocyte % 05/15/2024 9.3  5.0 - 12.0 % Final    Eosinophil % 05/15/2024 4.0  0.3 - 6.2 % Final    Basophil % 05/15/2024 1.0  0.0 - 1.5 % Final    Immature Grans % 05/15/2024 0.5  0.0 - 0.5 % Final    Neutrophils, Absolute 05/15/2024 2.84  1.70 - 7.00 10*3/mm3 Final    Lymphocytes, Absolute 05/15/2024  2.45  0.70 - 3.10 10*3/mm3 Final    Monocytes, Absolute 05/15/2024 0.58  0.10 - 0.90 10*3/mm3 Final    Eosinophils, Absolute 05/15/2024 0.25  0.00 - 0.40 10*3/mm3 Final    Basophils, Absolute 05/15/2024 0.06  0.00 - 0.20 10*3/mm3 Final    Immature Grans, Absolute 05/15/2024 0.03  0.00 - 0.05 10*3/mm3 Final    nRBC 05/15/2024 0.0  0.0 - 0.2 /100 WBC Final   Office Visit on 04/11/2024   Component Date Value Ref Range Status    Glucose 04/11/2024 212 (H)  70 - 99 mg/dL Final    Serial Number: [id number]:  [id number]    Hemoglobin A1C 04/11/2024 9.0 (A)  4.5 - 5.7 % Final    Lot Number 04/11/2024 [id number]   Final    Expiration Date 04/11/2024 12-   Final   Procedure visit on 03/01/2024   Component Date Value Ref Range Status    Color 03/01/2024 Yellow  Yellow, Straw, Dark Yellow, Nahomi Final    Clarity, UA 03/01/2024 Clear  Clear Final    Specific Gravity  03/01/2024 1.015  1.005 - 1.030 Final    pH, Urine 03/01/2024 7.0  5.0 - 8.0 Final    Leukocytes 03/01/2024 Negative  Negative Final    Nitrite, UA 03/01/2024 Negative  Negative Final    Protein, POC 03/01/2024 Negative  Negative mg/dL Final    Glucose, UA 03/01/2024 >=1000 mg/dL (3+) (A)  Negative mg/dL Final    Ketones, UA 03/01/2024 Negative  Negative Final    Urobilinogen, UA 03/01/2024 0.2 E.U./dL  Normal, 0.2 E.U./dL Final    Bilirubin 03/01/2024 Negative  Negative Final    Blood, UA 03/01/2024 Negative  Negative Final    Lot Number 03/01/2024 [id number]   Final    Expiration Date 03/01/2024 122,024   Final   Lab on 02/22/2024   Component Date Value Ref Range Status    Urine Culture 02/22/2024 No growth   Final   Office Visit on 02/22/2024   Component Date Value Ref Range Status    Glucose 02/22/2024 266 (H)  70 - 99 mg/dL Final    Serial Number: [id number]:  [id number]   Office Visit on 01/23/2024   Component Date Value Ref Range Status    Glucose 01/23/2024 245 (H)  70 - 99 mg/dL Final    Serial Number: [id number]:  [id number]     Hemoglobin A1C 01/23/2024 9.7 (A)  4.5 - 5.7 % Final    Lot Number 01/23/2024 [id number]   Final    Expiration Date 01/23/2024 9-24-25   Final       ASSESSMENT AND PLAN:  No diagnosis found.    Carlota is a 53 y.o. female who presents today for follow-up regarding medication management.  We have discussed the interval history and the treatment plan below, including potential R/B/SE of the recommended regimen of which the patient demonstrates understanding. Patient is agreeable to call 911 or go to the nearest ER should she become concerned for her own safety and/or the safety of those around her. There are are no overt indices of acute buck/psychosis on exam today. CHARLENE reviewed and is as expected.    Medication regimen: Begin amphetamine/dextroamphetamine 10 mg p.o. every morning, continue aripiprazole 7.5 mg p.o. daily continue bupropion  mg p.o. daily, continue clonidine 0.1 mg p.o. nightly continue duloxetine 60 mg p.o. daily, continue trazodone 50 mg p.o. daily, patient is advised not to misuse prescribed medications or to use them with any exogenous substances that aren't disclosed to this provider as they may interact with the regimen to the patient's detriment.   Risk Assessment: protracted risk is moderate, imminent risk is moderate do note that this is subject to change with the Alevism of new stressors, treatment non-adherence, use of substances, and/or new medical ails.   Monitoring: reviewed labs/imaging as populated above; ordered  Therapy: Declined deferred  Follow-up: 2 months  Communications: N/A    TREATMENT PLAN/GOALS: challenge patterns of living conducive to symptom burden, implement recommended regimen as above with augmentative, intermittent supportive psychotherapy to reduce symptom burden. Patient acknowledged and verbally consented to continue treatment. The importance of adherence to the recommended treatment and interval follow-up appointments was again emphasized today: patient  has good treatment adherence per given history. Patient was today reminded to limit daily caffeine intake, hydrate appropriately, eat healthy and nutritious foods, engage sleep hygiene measures, engage appropriate exposure to sunlight, engage with hobbies in balance with life necessities, and exercise appropriate to their capacity to do so.       Parts of this note are electronic transcriptions/translations of spoken language to printed text using the Dragon Dictation system.    Electronically signed by RAHEEM Young, 06/26/24

## 2024-06-27 ENCOUNTER — OFFICE VISIT (OUTPATIENT)
Dept: PSYCHIATRY | Facility: CLINIC | Age: 54
End: 2024-06-27
Payer: COMMERCIAL

## 2024-06-27 ENCOUNTER — TELEPHONE (OUTPATIENT)
Dept: PSYCHIATRY | Facility: CLINIC | Age: 54
End: 2024-06-27

## 2024-06-27 VITALS
SYSTOLIC BLOOD PRESSURE: 127 MMHG | HEIGHT: 60 IN | DIASTOLIC BLOOD PRESSURE: 84 MMHG | BODY MASS INDEX: 42.72 KG/M2 | HEART RATE: 88 BPM | WEIGHT: 217.6 LBS

## 2024-06-27 DIAGNOSIS — F90.9 ADULT ADHD: Primary | ICD-10-CM

## 2024-06-27 DIAGNOSIS — F41.1 GENERALIZED ANXIETY DISORDER: ICD-10-CM

## 2024-06-27 DIAGNOSIS — F51.04 PSYCHOPHYSIOLOGICAL INSOMNIA: ICD-10-CM

## 2024-06-27 DIAGNOSIS — F33.0 MDD (MAJOR DEPRESSIVE DISORDER), RECURRENT EPISODE, MILD: ICD-10-CM

## 2024-06-27 RX ORDER — DEXTROAMPHETAMINE SACCHARATE, AMPHETAMINE ASPARTATE, DEXTROAMPHETAMINE SULFATE AND AMPHETAMINE SULFATE 2.5; 2.5; 2.5; 2.5 MG/1; MG/1; MG/1; MG/1
10 TABLET ORAL EVERY MORNING
Qty: 30 TABLET | Refills: 0 | Status: SHIPPED | OUTPATIENT
Start: 2024-06-27 | End: 2024-07-27

## 2024-07-03 DIAGNOSIS — I10 PRIMARY HYPERTENSION: ICD-10-CM

## 2024-07-03 RX ORDER — AMLODIPINE BESYLATE 10 MG/1
10 TABLET ORAL DAILY
Qty: 30 TABLET | Refills: 5 | Status: SHIPPED | OUTPATIENT
Start: 2024-07-03

## 2024-07-03 RX ORDER — LOSARTAN POTASSIUM 25 MG/1
25 TABLET ORAL 2 TIMES DAILY
Qty: 60 TABLET | Refills: 1 | Status: SHIPPED | OUTPATIENT
Start: 2024-07-03

## 2024-07-08 ENCOUNTER — PRIOR AUTHORIZATION (OUTPATIENT)
Dept: DIABETES SERVICES | Facility: HOSPITAL | Age: 54
End: 2024-07-08
Payer: COMMERCIAL

## 2024-07-08 ENCOUNTER — TELEPHONE (OUTPATIENT)
Dept: DIABETES SERVICES | Facility: HOSPITAL | Age: 54
End: 2024-07-08
Payer: COMMERCIAL

## 2024-07-08 DIAGNOSIS — E11.65 TYPE 2 DIABETES MELLITUS WITH HYPERGLYCEMIA, WITH LONG-TERM CURRENT USE OF INSULIN: ICD-10-CM

## 2024-07-08 DIAGNOSIS — Z79.4 TYPE 2 DIABETES MELLITUS WITH HYPERGLYCEMIA, WITH LONG-TERM CURRENT USE OF INSULIN: ICD-10-CM

## 2024-07-08 NOTE — TELEPHONE ENCOUNTER
Patient came into the office requesting a refill of Mounjaro 12.5 to be sent to the [facility] at [facility].

## 2024-07-08 NOTE — TELEPHONE ENCOUNTER
Mounjaro    Key: [id number]    [facility]. You may close this dialog, return to your dashboard, and perform other tasks.    To check for an update later, open this request again from your dashboard. If [employer] has not replied within 24 hours for urgent requests or within 48 hours for standard requests, please contact [employer] at [phone].

## 2024-07-09 NOTE — TELEPHONE ENCOUNTER
This request has been approved.  Outcome  Approved today  The request has been approved. The authorization is effective from 07/09/2024 to 01/08/2025, as long as the member is enrolled in their current health plan. The request was approved as submitted. A written notification letter will follow with additional details.  Authorization Expiration Date: 1/7/2025

## 2024-07-23 DIAGNOSIS — N30.00 ACUTE CYSTITIS WITHOUT HEMATURIA: ICD-10-CM

## 2024-07-23 DIAGNOSIS — N32.81 OAB (OVERACTIVE BLADDER): Primary | ICD-10-CM

## 2024-07-23 RX ORDER — LEVOFLOXACIN 500 MG/1
500 TABLET, FILM COATED ORAL DAILY
Qty: 7 TABLET | Refills: 0 | Status: SHIPPED | OUTPATIENT
Start: 2024-08-27 | End: 2024-09-03

## 2024-07-24 NOTE — PROGRESS NOTES
Chief Complaint  Diabetes (Follow up, Med mgt, CGM eval A1c eval)    Referred By: Audra Tran, MADINA*    Subjective          Carlota Robin presents to [facility] for diabetes medication management    History of Present Illness    Visit type:  follow-up  Diabetes type:  Type 2  Current diabetes status/concerns/issues:  Reports she is tolerating the Mounjaro well w/o any side effects. States her average glucose is around 150mg/dl. States the increased Humalog has helped prevent postprandial hyperglycemia  Other health concerns: No new health concerns  Current Diabetes symptoms:    Polyuria: No   Polydipsia: No   Polyphagia: No   Blurred vision: Yes     Excessive fatigue: Yes    Known Diabetes complications:  Neuropathy: None; Location: N/A  Renal: None  Eyes: None; Location: N/A; Last Eye Exam:  2022 ; Location: [facility]  Amputation/Wounds: None  GI: Reflux  Cardiovascular: Hypertension and Hyperlipidemia  ED: N/A  Other: None  Hypoglycemia:  None reported at this time  Hypoglycemia Symptoms:  No hypoglycemia at this time  Current diabetes treatment:    Mounjaro 12.5 mg weekly, Toujeo 70 units bid, Humalog 18 units tid with meals  Blood glucose device:  Web and Rank CGM  Blood glucose monitoring frequency:  Continuous per CGM  Blood glucose range/average:  The 14-day sensor report shows an average glucose of 152mg/dL, with 82% in target range ( mgdL), 18% in the high range (181-250 mg/dL), 0% in the very high range (>250 mg/dL), 0% in the low range (54-70 mg/dL) and 0% in the very low range (<54 mg/dL).   Glucose Source: Device Reviewed  Dietary behavior:  Avoids sugary drinks, Number of meals each day - 3; Number of snacks each day - 1-2, she eliminated sugar from her diet and decreased her carb intake.   Activity/Exercise: walking, stretches, dancing to the oldies    Past Medical History:   Diagnosis Date    Acne     Allergic     Allergic rhinitis due to allergen 04/15/2021     Allergies     Anemia 2020    Anesthesia     HARD TO WAKE UP POSTOP    Anxiety 04/15/2021    Asthma     INHALERS PRN    Bundle branch block, right     SEE'S DR BENNETT IN 2019, NOW SEES ON AS NEEDED BASIS, MAINLY FOLLOW PCP ROUTINELY. DENIED CP/SOB    Depression     Diabetes     Diabetes mellitus, type 2 2020    DOESN'T CHECK BG AT HOME    Essential hypertension 2020    Fatigue 04/15/2021    Fibromyalgia     GERD (gastroesophageal reflux disease) 2020    High cholesterol     Hyperlipidemia 2020    Hypothyroidism 2020    Inguinal hernia     Insomnia     Macromastia     Migraine     Obesity 2020    DANY (obstructive sleep apnea) 2020    Palpitation     NO PROBLEMS CURRENTLY. FOLLOWS PCP    Right bundle branch block 2020    SEE'S DR BLUE ON AS NEEDED BASIS, SEE'S PCP ON YEARLY. DENIED CP/SOB    RLS (restless legs syndrome)     Seasonal allergies     Sinus trouble     Stomach pain 2016    Stress 2016    Thyroid disease     Urinary incontinence     Vitamin B 12 deficiency     Vitamin D deficiency 2020     Past Surgical History:   Procedure Laterality Date    BILATERAL BREAST REDUCTION Bilateral 2022    Procedure: BREAST REDUCTION;  Surgeon: Zeeshan Bone MD;  Location: [facility] – [place], [place];  Service: Plastics;  Laterality: Bilateral;    BREAST BIOPSY Right      SECTION      CHOLECYSTECTOMY      COLONOSCOPY      CYSTOSCOPY BOTOX INJECTION OF BLADDER  2019    ENDOSCOPY      HERNIA REPAIR      HYSTERECTOMY  2017    WISDOM TOOTH EXTRACTION       Family History   Problem Relation Age of Onset    Stroke Mother     Diabetes Mother     Restless legs syndrome Mother     Hyperlipidemia Mother     Nephrolithiasis Father     Sleep apnea Father     Depression Father     Restless legs syndrome Sister     Colon cancer Maternal Grandmother         50S    Colon cancer Paternal Grandfather         70S    Diabetes Maternal Uncle     Breast cancer Other          20S    Diabetes Other      Social History     Socioeconomic History    Marital status:    Tobacco Use    Smoking status: Never     Passive exposure: Never    Smokeless tobacco: Never   Vaping Use    Vaping status: Never Used   Substance and Sexual Activity    Alcohol use: Never    Drug use: Never    Sexual activity: Yes     Partners: Female     Birth control/protection: Hysterectomy     Allergies   Allergen Reactions    Pravastatin Sodium Nausea Only    Hydrocodone-Acetaminophen Hallucinations     OK TO TAKE REGULAR TYLENOL     Lisinopril Cough    Pollen Extract Other (See Comments)     CONGESTION, WATERY EYES, SNEEZING        Current Outpatient Medications:     albuterol (ProAir RespiClick) 108 (90 Base) MCG/ACT inhaler, Inhale 2 puffs Every 6 (Six) Hours As Needed for Wheezing or Shortness of Air., Disp: 18 g, Rfl: 2    amLODIPine (NORVASC) 10 MG tablet, Take 1 tablet by mouth daily., Disp: 30 tablet, Rfl: 5    ARIPiprazole (ABILIFY) 5 MG tablet, Take 1.5 tablets by mouth Daily., Disp: 45 tablet, Rfl: 5    Blood Glucose Monitoring Suppl (FreeStyle Lite) w/Device kit, , Disp: , Rfl:     buPROPion XL (WELLBUTRIN XL) 150 MG 24 hr tablet, Take 1 tablet by mouth Daily., Disp: , Rfl:     Calcium Carbonate 1500 (600 Ca) MG tablet, Take 1 tablet by mouth Daily., Disp: 30 tablet, Rfl: 5    cetirizine (zyrTEC) 10 MG tablet, Take 1 tablet by mouth Daily., Disp: , Rfl:     cimetidine (TAGAMET) 200 MG tablet, Take 1 tablet by mouth 2 (Two) Times a Day Before Meals., Disp: 60 tablet, Rfl: 5    cloNIDine (CATAPRES) 0.1 MG tablet, Take 1 tablet by mouth every night., Disp: 30 tablet, Rfl: 5    Continuous Glucose Sensor (FreeStyle Harley 3 Sensor) misc, Use 1 each Every 14 (Fourteen) Days., Disp: 2 each, Rfl: 5    cyanocobalamin (VITAMIN B-12) 1000 MCG tablet, Vitamin B-12 1,000 mcg oral tablet take 1 tablet by oral route daily   Active, Disp: , Rfl:     DULoxetine (CYMBALTA) 60 MG capsule, Take 1 capsule by mouth  Daily., Disp: 30 capsule, Rfl: 5    ezetimibe (Zetia) 10 MG tablet, Take 1 tablet by mouth Daily., Disp: 30 tablet, Rfl: 5    gabapentin (NEURONTIN) 100 MG capsule, Take 1 capsule by mouth 2 (Two) Times a Day As Needed (AS NEEDED FOR PAIN). 100MG TWICE A DAY, 400MG AT BEDTIME, Disp: , Rfl:     gabapentin (NEURONTIN) 400 MG capsule, Take 1 capsule by mouth Daily. 400MG AT BEDTIME, 100MG TWICE PER DAY, Disp: , Rfl:     hydroCHLOROthiazide 12.5 MG tablet, Take 1 tablet by mouth Daily., Disp: 30 tablet, Rfl: 5    Insulin Glargine, 1 Unit Dial, (Toujeo SoloStar) 300 UNIT/ML solution pen-injector injection, Inject 70 Units under the skin into the appropriate area as directed 2 (Two) Times a Day for 180 days., Disp: 42 mL, Rfl: 1    Insulin Lispro, 1 Unit Dial, (HumaLOG KwikPen) 100 UNIT/ML solution pen-injector, Per sliding scale tid with meals. Max daily dose of 40 units, Disp: 35 mL, Rfl: 1    Insulin Pen Needle (Pen Needles) 32G X 4 MM misc, Use 1 each 5 (Five) Times a Day., Disp: 450 each, Rfl: 1    iron polysaccharides (Ferrex 150) 150 MG capsule, Take 1 capsule by mouth Daily., Disp: 90 capsule, Rfl: 1    [START ON 8/27/2024] levoFLOXacin (LEVAQUIN) 500 MG tablet, Take 1 tablet by mouth Daily for 7 days. To start on Tuesday 8/27/24 prior to procedure, Disp: 7 tablet, Rfl: 0    levothyroxine (SYNTHROID, LEVOTHROID) 25 MCG tablet, Take 1 tablet by mouth Daily., Disp: 30 tablet, Rfl: 5    lisinopril (PRINIVIL,ZESTRIL) 5 MG tablet, Take 1 tablet by mouth Daily., Disp: , Rfl:     losartan (COZAAR) 25 MG tablet, Take 1 tablet by mouth 2 (two) times a day., Disp: 60 tablet, Rfl: 1    meloxicam (MOBIC) 7.5 MG tablet, Take 1 tablet by mouth daily., Disp: 30 tablet, Rfl: 0    montelukast (SINGULAIR) 10 MG tablet, Take 1 tablet by mouth Every Evening., Disp: 30 tablet, Rfl: 5    ondansetron (Zofran) 4 MG tablet, Take 1 tablet by mouth Every 8 (Eight) Hours As Needed for Nausea or Vomiting., Disp: 30 tablet, Rfl: 0     "pantoprazole (PROTONIX) 40 MG EC tablet, Take 1 tablet by mouth Daily., Disp: 30 tablet, Rfl: 5    promethazine (PHENERGAN) 12.5 MG tablet, Take 1 tablet by mouth Every 6 (Six) Hours As Needed for Nausea or Vomiting., Disp: 20 tablet, Rfl: 0    saccharomyces boulardii (FLORASTOR) 250 MG capsule, Take 1 capsule by mouth 2 (Two) Times a Day., Disp: , Rfl:     solifenacin (VESICARE) 5 MG tablet, Vesicare 5 mg oral tablet take 1 tablet (5 mg) by oral route once daily   Suspended, Disp: , Rfl:     topiramate (TOPAMAX) 50 MG tablet, , Disp: , Rfl:     traZODone (DESYREL) 50 MG tablet, Take 1 tablet by mouth every night., Disp: 30 tablet, Rfl: 5    vitamin D (ERGOCALCIFEROL) 1.25 MG (77832 UT) capsule capsule, Take 1 capsule by mouth 1 (One) Time Per Week., Disp: 5 capsule, Rfl: 5    Vitamin E 90 MG (200 UNIT) capsule, Take 1 capsule by mouth Daily., Disp: 30 capsule, Rfl: 5    FREESTYLE LITE test strip, Use as directed daily, Disp: 100 each, Rfl: 1    Tirzepatide (Mounjaro) 15 MG/0.5ML solution pen-injector pen, Inject 0.5 mL under the skin into the appropriate area as directed Every 7 (Seven) Days., Disp: 2 mL, Rfl: 5    Objective     Vitals:    07/25/24 1027   BP: 118/85   Pulse: 90   SpO2: 95%   Weight: 97.8 kg (215 lb 9.6 oz)   Height: 152.4 cm (60\")   PainSc: 0-No pain     Body mass index is 42.11 kg/m².    Physical Exam  Constitutional:       Appearance: Normal appearance. She is obese.      Comments: Severe Obesity (BMI >= 40) Pt Current BMI = 42.11     HENT:      Head: Normocephalic and atraumatic.      Right Ear: External ear normal.      Left Ear: External ear normal.      Nose: Nose normal.   Eyes:      Extraocular Movements: Extraocular movements intact.      Conjunctiva/sclera: Conjunctivae normal.   Pulmonary:      Effort: Pulmonary effort is normal.   Musculoskeletal:         General: Normal range of motion.      Cervical back: Normal range of motion.   Skin:     General: Skin is warm and dry. "   Neurological:      General: No focal deficit present.      Mental Status: She is alert and oriented to person, place, and time. Mental status is at baseline.   Psychiatric:         Mood and Affect: Mood normal.         Behavior: Behavior normal.         Thought Content: Thought content normal.         Judgment: Judgment normal.             Result Review :   The following data was reviewed by: RAHEEM Garcia on 07/25/2024:    Most Recent A1C          7/25/2024    10:29   HGBA1C Most Recent   Hemoglobin A1C 7.8        A1C Last 3 Results          1/23/2024    09:56 4/11/2024    10:53 7/25/2024    10:29   HGBA1C Last 3 Results   Hemoglobin A1C 9.7  9.0  7.8      A1c collected in the office today is 7.8%, indicating Uncontrolled Type II diabetes.  This result is down from the prior result of 9% collected on 4/11/24     Glucose   Date Value Ref Range Status   07/25/2024 155 (H) 70 - 99 mg/dL Final     Comment:     Serial Number: [id number]:  [id number]   09/22/2023 394 (H) 70 - 110 mg/dL Final     Point of care glucose in the office today is within normal limits for nonfasting glucose    Creatinine   Date Value Ref Range Status   05/15/2024 0.72 0.57 - 1.00 mg/dL Final   11/15/2023 0.62 0.57 - 1.00 mg/dL Final     eGFR   Date Value Ref Range Status   05/15/2024 99.5 >60.0 mL/min/1.73 Final   11/15/2023 106.0 >60.0 mL/min/1.73 Final     Labs collected on 5/15/24 show Normal values    Microalbumin, Urine   Date Value Ref Range Status   11/15/2023 <1.2 mg/dL Final   05/15/2023 2.1 mg/dL Final     Creatinine, Urine   Date Value Ref Range Status   03/29/2022 99.0 mg/dL Final     Microalbumin/Creatinine Ratio   Date Value Ref Range Status   03/29/2022 44.4 mg/g Final   12/14/2020 15.2 0.0 - 35.0 mg/g[Cre] Final     Urine microalbuminuria collected on 11/15/23 is negative for microalbuminuria    Total Cholesterol   Date Value Ref Range Status   05/15/2024 208 (H) 0 - 200 mg/dL Final   11/15/2023 251 (H) 0 -  200 mg/dL Final     Triglycerides   Date Value Ref Range Status   05/15/2024 346 (H) 0 - 150 mg/dL Final   11/15/2023 530 (H) 0 - 150 mg/dL Final     HDL Cholesterol   Date Value Ref Range Status   05/15/2024 33 (L) 40 - 60 mg/dL Final   11/15/2023 34 (L) 40 - 60 mg/dL Final     LDL Cholesterol    Date Value Ref Range Status   05/15/2024 115 (H) 0 - 100 mg/dL Final   11/15/2023 122 (H) 0 - 100 mg/dL Final     Lipid panel collected on 5/15/24 shows Hyperlipidemia, Hypertriglyceridemia, and low HDL            Assessment: Pt is here today for a 2 month follow up on her diabetes. Last visit Mounjaro was increased from 10mg once wk to 12.5mg once wk and her humalog was increased from 14 units tid w/meals to 18 units tid with meals. Reports she is tolerating the Mounjaro well w/o any side effects. States her average glucose is around 150mg/dl. States the increased Humalog has helped prevent postprandial hyperglycemia. Admits her blood sugars have improved but she is frustrated she has not lost any weight with Mounjaro since starting. Reviewed CGM report with pt in the office today. The 14-day sensor report shows an average glucose of 152mg/dL, with 82% in target range ( mgdL), 18% in the high range (181-250 mg/dL), 0% in the very high range (>250 mg/dL), 0% in the low range (54-70 mg/dL) and 0% in the very low range (<54 mg/dL). Her A1c in the office today is 7.8% which is a significant improvement from his previous A1c of 9% in April and 9.7% in January.          Diagnoses and all orders for this visit:    1. Type 2 diabetes mellitus with hyperglycemia, with long-term current use of insulin (Primary)  -     POC Glycosylated Hemoglobin (Hb A1C)  -     Tirzepatide (Mounjaro) 15 MG/0.5ML solution pen-injector pen; Inject 0.5 mL under the skin into the appropriate area as directed Every 7 (Seven) Days.  Dispense: 2 mL; Refill: 5  -     Continuous Glucose Sensor (FreeStyle Harley 3 Sensor) misc; Use 1 each Every 14  (Fourteen) Days.  Dispense: 2 each; Refill: 5  -     Cortisol - AM; Future  -     dexAMETHasone (DECADRON) 1 MG tablet; Take 1 tablet by mouth 1 (One) Time for 1 dose.  Dispense: 1 tablet; Refill: 0  -     Dexamethasone Level, Serum; Future    2. Vitamin B12 deficiency  -     Vitamin B12; Future    3. Uncontrolled type 2 diabetes mellitus with hyperglycemia  -     Continuous Glucose Sensor (FreeStyle Harley 3 Sensor) misc; Use 1 each Every 14 (Fourteen) Days.  Dispense: 2 each; Refill: 5    4. Weight gain  -     Cortisol - AM; Future  -     dexAMETHasone (DECADRON) 1 MG tablet; Take 1 tablet by mouth 1 (One) Time for 1 dose.  Dispense: 1 tablet; Refill: 0  -     Dexamethasone Level, Serum; Future    5. Other fatigue  -     Cortisol - AM; Future  -     dexAMETHasone (DECADRON) 1 MG tablet; Take 1 tablet by mouth 1 (One) Time for 1 dose.  Dispense: 1 tablet; Refill: 0  -     Dexamethasone Level, Serum; Future    6. Severe obesity (BMI >= 40)    7. Uses self-applied continuous glucose monitoring device    8. Type 2 diabetes mellitus with hyperglycemia, unspecified whether long term insulin use    Other orders  -     POC Glucose        Plan: Increased her dose of Mounjaro to 15mg once wk. Ordered a dexamethasone supression test to assess for hypercortisolism which includes dexamethasone 1mg PO x 1 the evening before the am cortisol and dexamethasone level. We will call the pt with the results. Scheduled a 3 month follow up and we will review her CGM and recheck her A1c at that time.     The patient will monitor her blood glucose levels per CGM.  If she develops problematic hyperglycemia or hypoglycemia or adverse drug reactions, she will contact the office for further instructions.        Follow Up     Return in about 3 months (around 10/25/2024) for Medication Mgmt, CGM Follow Up.    Patient was given instructions and counseling regarding her condition or for health maintenance advice. Please see specific information  pulled into the AVS if appropriate.     Marilee Laughlin, RAHEEM  07/25/2024      Dictated Utilizing Dragon Dictation.  Please note that portions of this note were completed with a voice recognition program.  Part of this note may be an electronic transcription/translation of spoken language to printed text using the Dragon Dictation System.

## 2024-07-25 ENCOUNTER — OFFICE VISIT (OUTPATIENT)
Dept: DIABETES SERVICES | Facility: HOSPITAL | Age: 54
End: 2024-07-25
Payer: COMMERCIAL

## 2024-07-25 VITALS
BODY MASS INDEX: 42.33 KG/M2 | HEIGHT: 60 IN | DIASTOLIC BLOOD PRESSURE: 85 MMHG | SYSTOLIC BLOOD PRESSURE: 118 MMHG | HEART RATE: 90 BPM | WEIGHT: 215.6 LBS | OXYGEN SATURATION: 95 %

## 2024-07-25 DIAGNOSIS — E66.01 SEVERE OBESITY (BMI >= 40): ICD-10-CM

## 2024-07-25 DIAGNOSIS — E11.65 TYPE 2 DIABETES MELLITUS WITH HYPERGLYCEMIA, WITH LONG-TERM CURRENT USE OF INSULIN: Primary | ICD-10-CM

## 2024-07-25 DIAGNOSIS — E11.65 TYPE 2 DIABETES MELLITUS WITH HYPERGLYCEMIA, UNSPECIFIED WHETHER LONG TERM INSULIN USE: ICD-10-CM

## 2024-07-25 DIAGNOSIS — E53.8 VITAMIN B12 DEFICIENCY: ICD-10-CM

## 2024-07-25 DIAGNOSIS — Z97.8 USES SELF-APPLIED CONTINUOUS GLUCOSE MONITORING DEVICE: ICD-10-CM

## 2024-07-25 DIAGNOSIS — R53.83 OTHER FATIGUE: ICD-10-CM

## 2024-07-25 DIAGNOSIS — R63.5 WEIGHT GAIN: ICD-10-CM

## 2024-07-25 DIAGNOSIS — E11.65 UNCONTROLLED TYPE 2 DIABETES MELLITUS WITH HYPERGLYCEMIA: ICD-10-CM

## 2024-07-25 DIAGNOSIS — Z79.4 TYPE 2 DIABETES MELLITUS WITH HYPERGLYCEMIA, WITH LONG-TERM CURRENT USE OF INSULIN: Primary | ICD-10-CM

## 2024-07-25 LAB
EXPIRATION DATE: ABNORMAL
GLUCOSE BLDC GLUCOMTR-MCNC: 155 MG/DL (ref 70–99)
HBA1C MFR BLD: 7.8 % (ref 4.5–5.7)
Lab: ABNORMAL

## 2024-07-25 PROCEDURE — 83036 HEMOGLOBIN GLYCOSYLATED A1C: CPT | Performed by: NURSE PRACTITIONER

## 2024-07-25 PROCEDURE — 82948 REAGENT STRIP/BLOOD GLUCOSE: CPT | Performed by: NURSE PRACTITIONER

## 2024-07-25 PROCEDURE — G0463 HOSPITAL OUTPT CLINIC VISIT: HCPCS | Performed by: NURSE PRACTITIONER

## 2024-07-25 RX ORDER — BLOOD-GLUCOSE SENSOR
1 EACH MISCELLANEOUS
Qty: 2 EACH | Refills: 5 | Status: SHIPPED | OUTPATIENT
Start: 2024-07-25

## 2024-07-25 RX ORDER — DEXAMETHASONE 1 MG
1 TABLET ORAL ONCE
Qty: 1 TABLET | Refills: 0 | Status: SHIPPED | OUTPATIENT
Start: 2024-07-25 | End: 2024-07-25

## 2024-07-25 NOTE — PATIENT INSTRUCTIONS
Increase your dose of Mounjaro from 12.5 mg once weekly to 15 mg once weekly.  Make sure to do this injection on the same day of the week.

## 2024-07-26 ENCOUNTER — LAB (OUTPATIENT)
Dept: LAB | Facility: HOSPITAL | Age: 54
End: 2024-07-26
Payer: COMMERCIAL

## 2024-07-26 DIAGNOSIS — R63.5 WEIGHT GAIN: ICD-10-CM

## 2024-07-26 DIAGNOSIS — R53.83 OTHER FATIGUE: ICD-10-CM

## 2024-07-26 DIAGNOSIS — E11.65 TYPE 2 DIABETES MELLITUS WITH HYPERGLYCEMIA, WITH LONG-TERM CURRENT USE OF INSULIN: ICD-10-CM

## 2024-07-26 DIAGNOSIS — E53.8 VITAMIN B12 DEFICIENCY: ICD-10-CM

## 2024-07-26 DIAGNOSIS — N30.00 ACUTE CYSTITIS WITHOUT HEMATURIA: ICD-10-CM

## 2024-07-26 DIAGNOSIS — Z79.4 TYPE 2 DIABETES MELLITUS WITH HYPERGLYCEMIA, WITH LONG-TERM CURRENT USE OF INSULIN: ICD-10-CM

## 2024-07-26 LAB
CORTIS AM PEAK SERPL-MCNC: 14.08 MCG/DL (ref 6.02–18.4)
VIT B12 BLD-MCNC: 1303 PG/ML (ref 211–946)

## 2024-07-26 PROCEDURE — 82533 TOTAL CORTISOL: CPT

## 2024-07-26 PROCEDURE — 80299 QUANTITATIVE ASSAY DRUG: CPT

## 2024-07-26 PROCEDURE — 36415 COLL VENOUS BLD VENIPUNCTURE: CPT

## 2024-07-26 PROCEDURE — 82607 VITAMIN B-12: CPT

## 2024-07-26 PROCEDURE — 87086 URINE CULTURE/COLONY COUNT: CPT

## 2024-07-27 DIAGNOSIS — E11.65 UNCONTROLLED TYPE 2 DIABETES MELLITUS WITH HYPERGLYCEMIA: ICD-10-CM

## 2024-07-27 DIAGNOSIS — E11.65 TYPE 2 DIABETES MELLITUS WITH HYPERGLYCEMIA, UNSPECIFIED WHETHER LONG TERM INSULIN USE: ICD-10-CM

## 2024-07-27 LAB — BACTERIA SPEC AEROBE CULT: NORMAL

## 2024-07-28 RX ORDER — BLOOD-GLUCOSE METER
KIT MISCELLANEOUS DAILY
Qty: 100 EACH | Refills: 1 | Status: SHIPPED | OUTPATIENT
Start: 2024-07-28

## 2024-07-29 DIAGNOSIS — N30.00 ACUTE CYSTITIS WITHOUT HEMATURIA: Primary | ICD-10-CM

## 2024-07-29 RX ORDER — MELOXICAM 7.5 MG/1
7.5 TABLET ORAL DAILY
Qty: 30 TABLET | Refills: 2 | Status: SHIPPED | OUTPATIENT
Start: 2024-07-29

## 2024-08-04 LAB — DEXAMETHASONE SERPL-MCNC: 268 NG/DL

## 2024-08-05 DIAGNOSIS — R79.89 ABNORMAL CORTISOL LEVEL: ICD-10-CM

## 2024-08-05 DIAGNOSIS — R53.83 OTHER FATIGUE: ICD-10-CM

## 2024-08-05 DIAGNOSIS — E11.65 TYPE 2 DIABETES MELLITUS WITH HYPERGLYCEMIA, UNSPECIFIED WHETHER LONG TERM INSULIN USE: Primary | ICD-10-CM

## 2024-08-05 DIAGNOSIS — R63.5 WEIGHT GAIN: ICD-10-CM

## 2024-08-05 DIAGNOSIS — E66.01 SEVERE OBESITY (BMI >= 40): ICD-10-CM

## 2024-08-06 ENCOUNTER — LAB (OUTPATIENT)
Dept: LAB | Facility: HOSPITAL | Age: 54
End: 2024-08-06
Payer: COMMERCIAL

## 2024-08-08 ENCOUNTER — OFFICE VISIT (OUTPATIENT)
Dept: PSYCHIATRY | Facility: CLINIC | Age: 54
End: 2024-08-08
Payer: COMMERCIAL

## 2024-08-08 ENCOUNTER — LAB (OUTPATIENT)
Dept: LAB | Facility: HOSPITAL | Age: 54
End: 2024-08-08
Payer: COMMERCIAL

## 2024-08-08 VITALS
DIASTOLIC BLOOD PRESSURE: 90 MMHG | HEIGHT: 60 IN | WEIGHT: 214.2 LBS | SYSTOLIC BLOOD PRESSURE: 141 MMHG | HEART RATE: 91 BPM | BODY MASS INDEX: 42.05 KG/M2

## 2024-08-08 DIAGNOSIS — F41.1 GENERALIZED ANXIETY DISORDER: ICD-10-CM

## 2024-08-08 DIAGNOSIS — F90.9 ADULT ADHD: Primary | ICD-10-CM

## 2024-08-08 DIAGNOSIS — F33.0 MDD (MAJOR DEPRESSIVE DISORDER), RECURRENT EPISODE, MILD: ICD-10-CM

## 2024-08-08 DIAGNOSIS — F51.04 PSYCHOPHYSIOLOGICAL INSOMNIA: ICD-10-CM

## 2024-08-08 PROCEDURE — 82024 ASSAY OF ACTH: CPT | Performed by: NURSE PRACTITIONER

## 2024-08-08 PROCEDURE — 82627 DEHYDROEPIANDROSTERONE: CPT | Performed by: NURSE PRACTITIONER

## 2024-08-08 PROCEDURE — 36415 COLL VENOUS BLD VENIPUNCTURE: CPT | Performed by: NURSE PRACTITIONER

## 2024-08-08 RX ORDER — AMOXICILLIN AND CLAVULANATE POTASSIUM 875; 125 MG/1; MG/1
TABLET, FILM COATED ORAL
COMMUNITY
Start: 2024-04-29 | End: 2024-08-08

## 2024-08-08 RX ORDER — LANCETS 28 GAUGE
EACH MISCELLANEOUS
COMMUNITY
Start: 2024-07-27 | End: 2024-08-08

## 2024-08-08 RX ORDER — OFLOXACIN 3 MG/ML
SOLUTION AURICULAR (OTIC)
COMMUNITY
Start: 2024-05-03

## 2024-08-08 RX ORDER — CEFDINIR 300 MG/1
CAPSULE ORAL
COMMUNITY
Start: 2024-05-03 | End: 2024-08-08

## 2024-08-08 RX ORDER — DEXAMETHASONE 1 MG
TABLET ORAL
COMMUNITY
Start: 2024-07-25

## 2024-08-08 NOTE — PROGRESS NOTES
"[facility]  Follow-up Visit    Chief Complaint:  \"I am wondering if I have ADHD\"     History of Present Illness: Carlota Robin is a 53 y.o. female who presents today for initial evaluation regarding screening for adult ADHD. Carlota presents unaccompanied in no acute distress and engages with me appropriately. Psychotropic regimen with which patient presents is described as aripiprazole 7.5 mg po q day, bupropion  mg po q day, clonidine 0.1 mg p.o. nightly duloxetine 60 mg p.o. daily.   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  Patient's  had a heart transplant and she provides medication management, transportation, and emotional support.  Patient states that her mood is stable on her current medication regimen. Her moods improve in tandem as her 's recovery milestones are being met. She also voices she has a strong support system should she need respite.   History is positive for signs/symptoms suggestive of consistent and excessive worry across several domains of life that contributes to tension and irritability throughout the day. History is positive for MDD, recurrent, mild, low mood, low energy, anhedonia, changes in sleep, changes in appetite, guilt, poor concentration, psychomotor changes, denies thoughts of being better off dead   Carlota describes having problems concentrating, and finishing tasks. With regard to ADHD: I am presumptively treating patient for this issue; history as provided today, presentation today, and (children) likely-positive family history would suggest a distinct possibility this is a contributing factor to patient's dysfunction in caregiver roles and engagements irrespective to screening results. Patient has learned and successfully implemented compensatory coping skills that, with the Presybeterian of layered stressors throughout adulthood, have begun to fail. Treating ADHD symptoms will " likely be a concise and appropriate route to address anxiety and mood issues that are, at least to some degree, secondary to deficits related to traits of ADHD.      Psychiatric screening is negative for pathognomonic history of: TBI, violence, suicidality, psychosis, and buck.      I have counseled the patient with regard to diagnoses and the recommended treatment regimen as documented below: I will assume prescriptive responsibility for ADHD agent, I will be prescribing Adderall 10 mg  for ADHD. Patient has been made aware of the long-term risks of tachyphylaxis/dependence and uncomfortable withdrawal that may occur with abrupt discontinuation of this agent. I have advised taking medication holidays to mitigate risk of dependence and tolerance and have advised the patient with regard to common psychological dependence that can contribute to perceived diminishment in treatment effectiveness. Patient has been advised to monitor and limit caffeine intake while taking this agent. Patient has been made aware of common SE - diminished appetite, insomnia, hypertension - for which this agent has propensity. I have specifically reviewed issues related to misuse and diversion with the patient today. and other medications. Patient acknowledges the diagnoses per my rendered interpretation. Patient demonstrates awareness/understanding of viable alternatives for treatment as well as potential risks, benefits, and side effects associated with this regimen and is amenable to proceed in this fashion.      Recommended lifestyle changes: 30 minutes of activity to increase HR 2-3 days weekly.     Psychiatric History:  Diagnoses: depression, and anxiety  Outpatient history: therapist  Inpatient history: none  Medication trials: aripiprazole, duloxetine, clonidine   Other treatment modalities: Psychotherapy  Self harm: No history  Suicide attempts: No  Abuse or neglect: past marriage     Substance Abuse History:    Types/methods/frequency: *none  Transtheoretical stage: none     Social History:  Residence: lives house,  and two children, dog and lizard  Vocation: no  Source of income: Social Security  Last grade completed: college  Pertinent developmental history: presumptive ADHD  Pertinent legal history: No history   Hobbies/interests: crafting, volunteer, work, bowling  Roman Catholic:spiritual   Exercise: not as much as I should  Dietary habits: no pertinent issues   Sleep hygiene: c-pap  Social habits: no pertinent issues   Sunlight: There are no concern for under-exposure.  Caffeine intake: no pertinent issues   Hydration habits: no pertinent issues    history: No            Interval History Carlota is a 54 y.o. female who presents today for follow-up. Carlota presents unaccompanied in no acute distress and engages with me appropriately.  Carlota perceives her medications to be working well    Current treatment regimen includes:   - aripiprazole 7.5 mg po q day  -bupropion  mg q day  -clonidine 0.1 mg po q HS  -duloxetine 60 mg po q day  -trazodone 50 mg po q HS  Side-effects per given history: None.      Today the patient feels happy.  Carlota reports that she is doing well has had some poor sleep lately due to the faulty CPAP mask but has reordered a new mask.  She does not report having any changes with mood. thought process and content are devoid of overt aberration suggestive of acute buck/psychosis. The patient denies SI/HI/AVH. There are not changes on exam today compared to most recent evaluation.    - sleep: problematic due to recent CPAP mask issues  - appetite: moderately controlled    I have counseled the patient with regard to diagnoses and the recommended treatment regimen as documented below. Patient acknowledges the diagnoses per my rendered interpretation. Patient demonstrates understanding of potential risks/benefits/side effects associated with this regimen and is amenable to proceed in  this fashion.     Assignment: begin journaling instances of overwhelm, response thereto, ideal response to cultivate insight and begin breaking a pattern of stimulus/response.        Social History     Socioeconomic History    Marital status:      Spouse name: ELIZABETH DIAZ    Number of children: 2    Years of education: 12+    Highest education level: Some college, no degree   Tobacco Use    Smoking status: Never     Passive exposure: Past    Smokeless tobacco: Never    Tobacco comments:     SECOND HAND EXPOSURE    Vaping Use    Vaping status: Never Used   Substance and Sexual Activity    Alcohol use: Never    Drug use: Never    Sexual activity: Yes     Partners: Female     Birth control/protection: Hysterectomy       Tobacco use counseling/intervention: patient does not use tobacco.     Problem List:  Patient Active Problem List   Diagnosis    Hypothyroidism    Type 2 diabetes mellitus    Vitamin B 12 deficiency    Vitamin D deficiency    Hyperlipidemia    Morbid (severe) obesity due to excess calories    Hypertension    Anemia    Anxiety    DANY (obstructive sleep apnea)    Bundle branch block, right    Allergies    GERD (gastroesophageal reflux disease)    Fatigue    Depression    Acne    Macromastia    Migraine    Urinary incontinence    Sinus trouble    Insomnia    Fibromyalgia    Inguinal hernia    Mild recurrent major depression    Seasonal allergic rhinitis    Stomach pain    Stress    OAB (overactive bladder)    Postoperative follow-up    Encounter for long-term (current) use of insulin    Post-menopausal    Overweight    Chronic pain    Migraine without aura and without status migrainosus, not intractable     Allergy:   Allergies   Allergen Reactions    Pravastatin Sodium Nausea Only    Hydrocodone-Acetaminophen Hallucinations     OK TO TAKE REGULAR TYLENOL     Lisinopril Cough    Pollen Extract Other (See Comments)     CONGESTION, WATERY EYES, SNEEZING         Discontinued Medications:  There are no  discontinued medications.    Current Medications:   Current Outpatient Medications   Medication Sig Dispense Refill    albuterol (ProAir RespiClick) 108 (90 Base) MCG/ACT inhaler Inhale 2 puffs Every 6 (Six) Hours As Needed for Wheezing or Shortness of Air. 18 g 2    amLODIPine (NORVASC) 10 MG tablet Take 1 tablet by mouth daily. 30 tablet 5    ARIPiprazole (ABILIFY) 5 MG tablet Take 1.5 tablets by mouth Daily. 45 tablet 5    Blood Glucose Monitoring Suppl (FreeStyle Lite) w/Device kit       buPROPion XL (WELLBUTRIN XL) 150 MG 24 hr tablet Take 1 tablet by mouth Daily.      Calcium Carbonate 1500 (600 Ca) MG tablet Take 1 tablet by mouth Daily. 30 tablet 5    cetirizine (zyrTEC) 10 MG tablet Take 1 tablet by mouth Daily.      cimetidine (TAGAMET) 200 MG tablet Take 1 tablet by mouth 2 (Two) Times a Day Before Meals. 60 tablet 5    cloNIDine (CATAPRES) 0.1 MG tablet Take 1 tablet by mouth every night. 30 tablet 5    Continuous Glucose Sensor (FreeStyle Harley 3 Sensor) [facility] Use 1 each Every 14 (Fourteen) Days. 2 each 5    cyanocobalamin (VITAMIN B-12) 1000 MCG tablet Vitamin B-12 1,000 mcg oral tablet take 1 tablet by oral route daily   Active      dexAMETHasone (DECADRON) 1 MG tablet       DULoxetine (CYMBALTA) 60 MG capsule Take 1 capsule by mouth Daily. 30 capsule 5    ezetimibe (Zetia) 10 MG tablet Take 1 tablet by mouth Daily. 30 tablet 5    FREESTYLE LITE test strip Use as directed daily 100 each 1    gabapentin (NEURONTIN) 100 MG capsule Take 1 capsule by mouth 2 (Two) Times a Day As Needed (AS NEEDED FOR PAIN). 100MG TWICE A DAY, 400MG AT BEDTIME      gabapentin (NEURONTIN) 400 MG capsule Take 1 capsule by mouth Daily. 400MG AT BEDTIME, 100MG TWICE PER DAY      hydroCHLOROthiazide 12.5 MG tablet Take 1 tablet by mouth Daily. 30 tablet 5    Insulin Glargine, 1 Unit Dial, (Toujeo SoloStar) 300 UNIT/ML solution pen-injector injection Inject 70 Units under the skin into the appropriate area as directed 2 (Two)  Times a Day for 180 days. 42 mL 1    Insulin Lispro, 1 Unit Dial, (HumaLOG KwikPen) 100 UNIT/ML solution pen-injector Per sliding scale tid with meals. Max daily dose of 40 units 35 mL 1    Insulin Pen Needle (Pen Needles) 32G X 4 MM misc Use 1 each 5 (Five) Times a Day. 450 each 1    iron polysaccharides (Ferrex 150) 150 MG capsule Take 1 capsule by mouth Daily. 90 capsule 1    [START ON 8/27/2024] levoFLOXacin (LEVAQUIN) 500 MG tablet Take 1 tablet by mouth Daily for 7 days. To start on Tuesday 8/27/24 prior to procedure 7 tablet 0    levothyroxine (SYNTHROID, LEVOTHROID) 25 MCG tablet Take 1 tablet by mouth Daily. 30 tablet 5    lisinopril (PRINIVIL,ZESTRIL) 5 MG tablet Take 1 tablet by mouth Daily.      losartan (COZAAR) 25 MG tablet Take 1 tablet by mouth 2 (two) times a day. 60 tablet 1    meloxicam (MOBIC) 7.5 MG tablet Take 1 tablet by mouth daily. 30 tablet 2    montelukast (SINGULAIR) 10 MG tablet Take 1 tablet by mouth Every Evening. 30 tablet 5    ofloxacin (FLOXIN) 0.3 % otic solution       ondansetron (Zofran) 4 MG tablet Take 1 tablet by mouth Every 8 (Eight) Hours As Needed for Nausea or Vomiting. 30 tablet 0    pantoprazole (PROTONIX) 40 MG EC tablet Take 1 tablet by mouth Daily. 30 tablet 5    promethazine (PHENERGAN) 12.5 MG tablet Take 1 tablet by mouth Every 6 (Six) Hours As Needed for Nausea or Vomiting. 20 tablet 0    saccharomyces boulardii (FLORASTOR) 250 MG capsule Take 1 capsule by mouth 2 (Two) Times a Day.      solifenacin (VESICARE) 5 MG tablet Vesicare 5 mg oral tablet take 1 tablet (5 mg) by oral route once daily   Suspended      Tirzepatide (Mounjaro) 15 MG/0.5ML solution pen-injector pen Inject 0.5 mL under the skin into the appropriate area as directed Every 7 (Seven) Days. 2 mL 5    topiramate (TOPAMAX) 50 MG tablet       traZODone (DESYREL) 50 MG tablet Take 1 tablet by mouth every night. 30 tablet 5    vitamin D (ERGOCALCIFEROL) 1.25 MG (12203 UT) capsule capsule Take 1 capsule  by mouth 1 (One) Time Per Week. 5 capsule 5    Vitamin E 90 MG (200 UNIT) capsule Take 1 capsule by mouth Daily. 30 capsule 5    amoxicillin-clavulanate (AUGMENTIN) 875-125 MG per tablet  (Patient not taking: Reported on 8/8/2024)      cefdinir (OMNICEF) 300 MG capsule  (Patient not taking: Reported on 8/8/2024)      Lancets (freestyle) lancets  (Patient not taking: Reported on 8/8/2024)       No current facility-administered medications for this visit.     Past Medical History:  Past Medical History:   Diagnosis Date    Acne     Allergic     Allergic rhinitis due to allergen 04/15/2021    Allergies     Anemia 12/03/2020    Anesthesia     HARD TO WAKE UP POSTOP    Anxiety 04/15/2021    Asthma     INHALERS PRN    Bundle branch block, right     SEE'S DR BENNETT IN 2019, NOW SEES ON AS NEEDED BASIS, MAINLY FOLLOW PCP ROUTINELY. DENIED CP/SOB    Depression     Diabetes     Diabetes mellitus, type 2 06/08/2020    DOESN'T CHECK BG AT HOME    Essential hypertension 06/08/2020    Fatigue 04/15/2021    Fibromyalgia     GERD (gastroesophageal reflux disease) 12/03/2020    High cholesterol     Hyperlipidemia 12/03/2020    Hypothyroidism 06/16/2020    Inguinal hernia     Insomnia     Macromastia     Migraine     Obesity 02/25/2020    DANY (obstructive sleep apnea) 06/08/2020    Palpitation     NO PROBLEMS CURRENTLY. FOLLOWS PCP    Right bundle branch block 06/16/2020    SEE'S DR BLUE ON AS NEEDED BASIS, SEE'S PCP ON YEARLY. DENIED CP/SOB    RLS (restless legs syndrome)     Seasonal allergies     Sinus trouble     Stomach pain 2016    Stress 2016    Thyroid disease     Urinary incontinence     Vitamin B 12 deficiency     Vitamin D deficiency 06/08/2020     Past Surgical History:  Past Surgical History:   Procedure Laterality Date    BILATERAL BREAST REDUCTION Bilateral 01/21/2022    Procedure: BREAST REDUCTION;  Surgeon: Zeeshan Bone MD;  Location: [facility] OR [facility];  Service: Plastics;  Laterality: Bilateral;    BREAST  "BIOPSY Right      SECTION      CHOLECYSTECTOMY      COLONOSCOPY      CYSTOSCOPY BOTOX INJECTION OF BLADDER  2019    ENDOSCOPY      HERNIA REPAIR      HYSTERECTOMY  2017    WISDOM TOOTH EXTRACTION         MENTAL STATUS EXAM   General Appearance:  Cleanly groomed and dressed and well developed  Eye Contact:  Good eye contact  Attitude:  Cooperative, polite and candid  Motor Activity:  Normal gait, posture  Muscle Strength:  Normal  Speech:  Normal rate, tone, volume  Language:  Spontaneous  Mood and affect:  Normal, pleasant  Hopelessness:  Denies  Loneliness: Denies  Thought Process:  Logical  Associations/ Thought Content:  No delusions  Hallucinations:  None  Suicidal Ideations:  Not present  Homicidal Ideation:  Not present  Sensorium:  Alert  Orientation:  Person, place, time and situation  Immediate Recall, Recent, and Remote Memory:  Intact  Attention Span/ Concentration:  Good  Fund of Knowledge:  Appropriate for age and educational level  Intellectual Functioning:  Above average  Insight:  Good  Judgement:  Good  Reliability:  Good  Impulse Control:  Good      Vital Signs:   /90   Pulse 91   Ht 152.4 cm (60\")   Wt 97.2 kg (214 lb 3.2 oz)   BMI 41.83 kg/m²    Lab Results:   Lab on 2024   Component Date Value Ref Range Status    Vitamin B-12 2024 1,303 (H)  211 - 946 pg/mL Final    Urine Culture 2024 50,000 CFU/mL Mixed [place] Isolated   Final    Cortisol - AM 2024 14.08  6.02 - 18.40 mcg/dL Final    Dexamethasone, Serum 2024 268  ng/dL Final    This test was developed and its performance characteristics  determined by [facility]. It has not been cleared or approved  by the Food and Drug Administration.  Reference Range:  Adults baseline: <30  8:00 AM following 1 mg dexamethasone   previous evenin - 295  8:00 AM following 8 mg dexamethasone  (4 x 2 mg doses) previous day:  1600 - 2850   Office Visit on 2024   Component Date Value Ref Range Status "    Glucose 07/25/2024 155 (H)  70 - 99 mg/dL Final    Serial Number: [id number]:  [id number]    Hemoglobin A1C 07/25/2024 7.8 (A)  4.5 - 5.7 % Final    Lot Number 07/25/2024 [id number]   Final    Expiration Date 07/25/2024 4/4/26   Final   Lab on 05/28/2024   Component Date Value Ref Range Status    Amphet/Methamphet, Screen 05/28/2024 Negative  Negative Final    Barbiturates Screen, Urine 05/28/2024 Negative  Negative Final    Benzodiazepine Screen, Urine 05/28/2024 Negative  Negative Final    Cocaine Screen, Urine 05/28/2024 Negative  Negative Final    Opiate Screen 05/28/2024 Negative  Negative Final    THC, Screen, Urine 05/28/2024 Negative  Negative Final    Methadone Screen, Urine 05/28/2024 Negative  Negative Final    Oxycodone Screen, Urine 05/28/2024 Negative  Negative Final    Fentanyl, Urine 05/28/2024 Negative  Negative Final   Office Visit on 05/23/2024   Component Date Value Ref Range Status    Glucose 05/23/2024 303 (H)  70 - 99 mg/dL Final    Serial Number: [id number]:  [id number]   Office Visit on 05/15/2024   Component Date Value Ref Range Status    Glucose 05/15/2024 249 (H)  65 - 99 mg/dL Final    BUN 05/15/2024 10  6 - 20 mg/dL Final    Creatinine 05/15/2024 0.72  0.57 - 1.00 mg/dL Final    Sodium 05/15/2024 138  136 - 145 mmol/L Final    Potassium 05/15/2024 4.4  3.5 - 5.2 mmol/L Final    Chloride 05/15/2024 102  98 - 107 mmol/L Final    CO2 05/15/2024 26.1  22.0 - 29.0 mmol/L Final    Calcium 05/15/2024 9.3  8.6 - 10.5 mg/dL Final    Total Protein 05/15/2024 6.7  6.0 - 8.5 g/dL Final    Albumin 05/15/2024 3.9  3.5 - 5.2 g/dL Final    ALT (SGPT) 05/15/2024 25  1 - 33 U/L Final    AST (SGOT) 05/15/2024 18  1 - 32 U/L Final    Alkaline Phosphatase 05/15/2024 126 (H)  39 - 117 U/L Final    Total Bilirubin 05/15/2024 <0.2  0.0 - 1.2 mg/dL Final    Globulin 05/15/2024 2.8  gm/dL Final    A/G Ratio 05/15/2024 1.4  g/dL Final    BUN/Creatinine Ratio 05/15/2024 13.9  7.0 - 25.0 Final     Anion Gap 05/15/2024 9.9  5.0 - 15.0 mmol/L Final    eGFR 05/15/2024 99.5  >60.0 mL/min/1.73 Final    TSH 05/15/2024 3.580  0.270 - 4.200 uIU/mL Final    Total Cholesterol 05/15/2024 208 (H)  0 - 200 mg/dL Final    Triglycerides 05/15/2024 346 (H)  0 - 150 mg/dL Final    HDL Cholesterol 05/15/2024 33 (L)  40 - 60 mg/dL Final    LDL Cholesterol  05/15/2024 115 (H)  0 - 100 mg/dL Final    VLDL Cholesterol 05/15/2024 60 (H)  5 - 40 mg/dL Final    LDL/HDL Ratio 05/15/2024 3.21   Final    25 Hydroxy, Vitamin D 05/15/2024 48.4  30.0 - 100.0 ng/ml Final    Free T4 05/15/2024 0.98  0.93 - 1.70 ng/dL Final    T3, Free 05/15/2024 3.04  2.00 - 4.40 pg/mL Final    Ferritin 05/15/2024 156.00 (H)  13.00 - 150.00 ng/mL Final    Iron 05/15/2024 55  37 - 145 mcg/dL Final    Iron Saturation (TSAT) 05/15/2024 14 (L)  20 - 50 % Final    Transferrin 05/15/2024 263  200 - 360 mg/dL Final    TIBC 05/15/2024 392  298 - 536 mcg/dL Final    WBC 05/15/2024 6.21  3.40 - 10.80 10*3/mm3 Final    RBC 05/15/2024 4.88  3.77 - 5.28 10*6/mm3 Final    Hemoglobin 05/15/2024 13.9  12.0 - 15.9 g/dL Final    Hematocrit 05/15/2024 41.5  34.0 - 46.6 % Final    MCV 05/15/2024 85.0  79.0 - 97.0 fL Final    MCH 05/15/2024 28.5  26.6 - 33.0 pg Final    MCHC 05/15/2024 33.5  31.5 - 35.7 g/dL Final    RDW 05/15/2024 13.4  12.3 - 15.4 % Final    RDW-SD 05/15/2024 41.1  37.0 - 54.0 fl Final    MPV 05/15/2024 10.3  6.0 - 12.0 fL Final    Platelets 05/15/2024 295  140 - 450 10*3/mm3 Final    Neutrophil % 05/15/2024 45.7  42.7 - 76.0 % Final    Lymphocyte % 05/15/2024 39.5  19.6 - 45.3 % Final    Monocyte % 05/15/2024 9.3  5.0 - 12.0 % Final    Eosinophil % 05/15/2024 4.0  0.3 - 6.2 % Final    Basophil % 05/15/2024 1.0  0.0 - 1.5 % Final    Immature Grans % 05/15/2024 0.5  0.0 - 0.5 % Final    Neutrophils, Absolute 05/15/2024 2.84  1.70 - 7.00 10*3/mm3 Final    Lymphocytes, Absolute 05/15/2024 2.45  0.70 - 3.10 10*3/mm3 Final    Monocytes, Absolute 05/15/2024 0.58   0.10 - 0.90 10*3/mm3 Final    Eosinophils, Absolute 05/15/2024 0.25  0.00 - 0.40 10*3/mm3 Final    Basophils, Absolute 05/15/2024 0.06  0.00 - 0.20 10*3/mm3 Final    Immature Grans, Absolute 05/15/2024 0.03  0.00 - 0.05 10*3/mm3 Final    nRBC 05/15/2024 0.0  0.0 - 0.2 /100 WBC Final   Office Visit on 04/11/2024   Component Date Value Ref Range Status    Glucose 04/11/2024 212 (H)  70 - 99 mg/dL Final    Serial Number: [id number]:  [id number]    Hemoglobin A1C 04/11/2024 9.0 (A)  4.5 - 5.7 % Final    Lot Number 04/11/2024 [id number]   Final    Expiration Date 04/11/2024 12-   Final   Procedure visit on 03/01/2024   Component Date Value Ref Range Status    Color 03/01/2024 Yellow  Yellow, Straw, Dark Yellow, N[place]omi Final    Clarity, UA 03/01/2024 Clear  Clear Final    Specific Gravity  03/01/2024 1.015  1.005 - 1.030 Final    pH, Urine 03/01/2024 7.0  5.0 - 8.0 Final    Leukocytes 03/01/2024 Negative  Negative Final    Nitrite, UA 03/01/2024 Negative  Negative Final    Protein, POC 03/01/2024 Negative  Negative mg/dL Final    Glucose, UA 03/01/2024 >=1000 mg/dL (3+) (A)  Negative mg/dL Final    Ketones, UA 03/01/2024 Negative  Negative Final    Urobilinogen, UA 03/01/2024 0.2 E.U./dL  Normal, 0.2 E.U./dL Final    Bilirubin 03/01/2024 Negative  Negative Final    Blood, UA 03/01/2024 Negative  Negative Final    Lot Number 03/01/2024 [id number]   Final    Expiration Date 03/01/2024 122,024   Final   Lab on 02/22/2024   Component Date Value Ref Range Status    Urine Culture 02/22/2024 No growth   Final   Office Visit on 02/22/2024   Component Date Value Ref Range Status    Glucose 02/22/2024 266 (H)  70 - 99 mg/dL Final    Serial Number: [id number]:  [id number]       ASSESSMENT AND PLAN:    ICD-10-CM ICD-9-CM   1. Adult ADHD  F90.9 314.01   2. Psychophysiological insomnia  F51.04 307.42   3. MDD (major depressive disorder), recurrent episode, mild  F33.0 296.31   4. Generalized anxiety disorder  F41.1  300.02       Carlota is a 54 y.o. female who presents today for follow-up regarding medication management . We have discussed the interval history and the treatment plan below, including potential R/B/SE of the recommended regimen of which the patient demonstrates understanding. Patient is agreeable to call 911 or go to the nearest ER should she become concerned for her own safety and/or the safety of those around her. There are are no overt indices of acute buck/psychosis on exam today.     Medication regimen: ONCE CHARLENE reviewed, Begin amphetamine/dextroamphetamine 10 mg p.o. every morning, continue aripiprazole 7.5 mg p.o. daily continue bupropion  mg p.o. daily, continue clonidine 0.1 mg p.o. nightly continue duloxetine 60 mg p.o. daily, continue trazodone 50 mg p.o. daily, ; patient is advised not to misuse prescribed medications or to use them with any exogenous substances that aren't disclosed to this provider as they may interact with the regimen to the patient's detriment.   Risk Assessment: protracted risk is moderate imminent risk is moderate do note that this is subject to change with the Quaker of new stressors, treatment non-adherence, use of substances, and/or new medical ails.   Monitoring: reviewed labs/imaging as populated above; ordered  Therapy: Deferred declined but recommended  Follow-up: 2 months  Communications: N/A    TREATMENT PLAN/GOALS: challenge patterns of living conducive to symptom burden, implement recommended regimen as above with augmentative, intermittent supportive psychotherapy to reduce symptom burden. Patient acknowledged and verbally consented to continue treatment. The importance of adherence to the recommended treatment and interval follow-up appointments was again emphasized today: patient has good treatment adherence per given history. Patient was today reminded to limit daily caffeine intake, hydrate appropriately, eat healthy and nutritious foods, engage sleep  hygiene measures, engage appropriate exposure to sunlight, engage with hobbies in balance with life necessities, and exercise appropriate to their capacity to do so.       Parts of this note are electronic transcriptions/translations of spoken language to printed text using the Dragon Dictation system.    Electronically signed by RAHEEM Young, 08/08/24

## 2024-08-09 DIAGNOSIS — E11.65 TYPE 2 DIABETES MELLITUS WITH HYPERGLYCEMIA, WITH LONG-TERM CURRENT USE OF INSULIN: ICD-10-CM

## 2024-08-09 DIAGNOSIS — Z79.4 TYPE 2 DIABETES MELLITUS WITH HYPERGLYCEMIA, WITH LONG-TERM CURRENT USE OF INSULIN: ICD-10-CM

## 2024-08-09 DIAGNOSIS — E11.65 UNCONTROLLED TYPE 2 DIABETES MELLITUS WITH HYPERGLYCEMIA: ICD-10-CM

## 2024-08-09 LAB
ACTH PLAS-MCNC: 15.7 PG/ML (ref 7.2–63.3)
DHEA-S SERPL-MCNC: 49.1 UG/DL (ref 29.4–220.5)

## 2024-08-09 RX ORDER — BLOOD-GLUCOSE SENSOR
EACH MISCELLANEOUS
Qty: 2 EACH | Refills: 5 | Status: SHIPPED | OUTPATIENT
Start: 2024-08-09

## 2024-08-12 DIAGNOSIS — F90.9 ADULT ADHD: Primary | ICD-10-CM

## 2024-08-12 RX ORDER — DEXTROAMPHETAMINE SACCHARATE, AMPHETAMINE ASPARTATE, DEXTROAMPHETAMINE SULFATE AND AMPHETAMINE SULFATE 2.5; 2.5; 2.5; 2.5 MG/1; MG/1; MG/1; MG/1
10 TABLET ORAL DAILY
Qty: 30 TABLET | Refills: 0 | Status: SHIPPED | OUTPATIENT
Start: 2024-08-12 | End: 2024-09-11

## 2024-08-21 DIAGNOSIS — E11.65 TYPE 2 DIABETES MELLITUS WITH HYPERGLYCEMIA, UNSPECIFIED WHETHER LONG TERM INSULIN USE: ICD-10-CM

## 2024-08-21 RX ORDER — INSULIN LISPRO 100 [IU]/ML
INJECTION, SOLUTION INTRAVENOUS; SUBCUTANEOUS
Qty: 30 ML | Refills: 1 | Status: SHIPPED | OUTPATIENT
Start: 2024-08-21

## 2024-08-27 ENCOUNTER — LAB (OUTPATIENT)
Dept: LAB | Facility: HOSPITAL | Age: 54
End: 2024-08-27
Payer: COMMERCIAL

## 2024-08-27 DIAGNOSIS — N30.00 ACUTE CYSTITIS WITHOUT HEMATURIA: ICD-10-CM

## 2024-08-27 PROCEDURE — 87086 URINE CULTURE/COLONY COUNT: CPT

## 2024-08-29 LAB — BACTERIA SPEC AEROBE CULT: NO GROWTH

## 2024-08-30 ENCOUNTER — PROCEDURE VISIT (OUTPATIENT)
Dept: UROLOGY | Facility: CLINIC | Age: 54
End: 2024-08-30
Payer: COMMERCIAL

## 2024-08-30 ENCOUNTER — PREP FOR SURGERY (OUTPATIENT)
Dept: OTHER | Facility: HOSPITAL | Age: 54
End: 2024-08-30
Payer: COMMERCIAL

## 2024-08-30 VITALS
BODY MASS INDEX: 42.01 KG/M2 | HEIGHT: 60 IN | WEIGHT: 214 LBS | DIASTOLIC BLOOD PRESSURE: 96 MMHG | SYSTOLIC BLOOD PRESSURE: 133 MMHG

## 2024-08-30 DIAGNOSIS — N32.81 OAB (OVERACTIVE BLADDER): Primary | ICD-10-CM

## 2024-08-30 LAB
BILIRUB BLD-MCNC: NEGATIVE MG/DL
CLARITY, POC: CLEAR
COLOR UR: YELLOW
EXPIRATION DATE: ABNORMAL
GLUCOSE UR STRIP-MCNC: NEGATIVE MG/DL
KETONES UR QL: ABNORMAL
LEUKOCYTE EST, POC: ABNORMAL
Lab: ABNORMAL
NITRITE UR-MCNC: NEGATIVE MG/ML
PH UR: 7 [PH] (ref 5–8)
PROT UR STRIP-MCNC: NEGATIVE MG/DL
RBC # UR STRIP: NEGATIVE /UL
SP GR UR: 1.02 (ref 1–1.03)
UROBILINOGEN UR QL: ABNORMAL

## 2024-08-30 NOTE — PROGRESS NOTES
Bladder Botox    Date/Time: 8/30/2024 10:40 AM    Performed by: Dyan Yuan MD  Authorized by: Dyan Yuan MD  Preparation: Patient was prepped and draped in the usual sterile fashion.  Local anesthesia used: no    Anesthesia:  Local anesthesia used: no    Sedation:  Patient sedated: no    Patient tolerance: patient tolerated the procedure well with no immediate complications        Dx:  OAB    The flexible cystoscope was inserted. Bladder was inspected and no abnormalities seen. 20 injection sites on the posterior bladder wall were injected with 0.5 cc of botox for a total of 100 units. No complications. The cystoscope was removed. The patient tolerated the procedure well with no bleeding.

## 2024-09-09 DIAGNOSIS — R79.89 ABNORMALITY OF ADRENOCORTICOTROPIC HORMONE (ACTH): Primary | ICD-10-CM

## 2024-09-16 DIAGNOSIS — I10 PRIMARY HYPERTENSION: ICD-10-CM

## 2024-09-16 RX ORDER — LOSARTAN POTASSIUM 25 MG/1
25 TABLET ORAL 2 TIMES DAILY
Qty: 60 TABLET | Refills: 0 | Status: SHIPPED | OUTPATIENT
Start: 2024-09-16

## 2024-09-23 ENCOUNTER — HOSPITAL ENCOUNTER (OUTPATIENT)
Dept: MAMMOGRAPHY | Facility: HOSPITAL | Age: 54
Discharge: HOME | End: 2024-09-23
Admitting: NURSE PRACTITIONER
Payer: COMMERCIAL

## 2024-09-23 DIAGNOSIS — R92.8 ABNORMAL MAMMOGRAM: ICD-10-CM

## 2024-09-23 PROCEDURE — 77065 DX MAMMO INCL CAD UNI: CPT

## 2024-09-23 PROCEDURE — G0279 TOMOSYNTHESIS, MAMMO: HCPCS

## 2024-09-25 DIAGNOSIS — R92.8 ABNORMAL MAMMOGRAM: Primary | ICD-10-CM

## 2024-09-30 DIAGNOSIS — Z79.4 TYPE 2 DIABETES MELLITUS WITH HYPERGLYCEMIA, WITH LONG-TERM CURRENT USE OF INSULIN: ICD-10-CM

## 2024-09-30 DIAGNOSIS — E11.65 UNCONTROLLED TYPE 2 DIABETES MELLITUS WITH HYPERGLYCEMIA: ICD-10-CM

## 2024-09-30 DIAGNOSIS — E11.65 TYPE 2 DIABETES MELLITUS WITH HYPERGLYCEMIA, UNSPECIFIED WHETHER LONG TERM INSULIN USE: ICD-10-CM

## 2024-09-30 DIAGNOSIS — E11.65 TYPE 2 DIABETES MELLITUS WITH HYPERGLYCEMIA, WITH LONG-TERM CURRENT USE OF INSULIN: ICD-10-CM

## 2024-10-01 ENCOUNTER — TELEPHONE (OUTPATIENT)
Dept: DIABETES SERVICES | Facility: HOSPITAL | Age: 54
End: 2024-10-01
Payer: COMMERCIAL

## 2024-10-01 DIAGNOSIS — E11.65 TYPE 2 DIABETES MELLITUS WITH HYPERGLYCEMIA, WITH LONG-TERM CURRENT USE OF INSULIN: Primary | ICD-10-CM

## 2024-10-01 DIAGNOSIS — Z79.4 TYPE 2 DIABETES MELLITUS WITH HYPERGLYCEMIA, WITH LONG-TERM CURRENT USE OF INSULIN: Primary | ICD-10-CM

## 2024-10-01 RX ORDER — INSULIN GLARGINE 100 [IU]/ML
70 INJECTION, SOLUTION SUBCUTANEOUS 2 TIMES DAILY
Qty: 126 ML | Refills: 1 | Status: SHIPPED | OUTPATIENT
Start: 2024-10-01 | End: 2025-03-30

## 2024-10-01 NOTE — TELEPHONE ENCOUNTER
Called and talked with patient.  Has not been call regarding Open Mri being scheduled.   Checked back with Roberta.  She is sending the order over to [place] and if the patient does not hear in a couple days she is to call the office back.   Called patient back and left detailed message pertaining to this.

## 2024-10-02 DIAGNOSIS — R79.89 ABNORMALITY OF ADRENOCORTICOTROPIC HORMONE (ACTH): Primary | ICD-10-CM

## 2024-10-02 RX ORDER — INSULIN GLARGINE 300 U/ML
70 INJECTION, SOLUTION SUBCUTANEOUS 2 TIMES DAILY
Qty: 18 ML | OUTPATIENT
Start: 2024-10-02 | End: 2025-03-31

## 2024-10-02 NOTE — TELEPHONE ENCOUNTER
Called and spoke with patient.  Instructed that her PCP will be doing the PA for her MRI.  Patient states she has talked with her and she is working on that.   Also instructed her about the Toujeo not being covered and that Kaur was being called to her pharmacy.  Patient states she will go and pick that up.  If any other questions or concerns she will call the office

## 2024-10-17 ENCOUNTER — HOSPITAL ENCOUNTER (OUTPATIENT)
Dept: MRI IMAGING | Facility: HOSPITAL | Age: 54
Discharge: HOME | End: 2024-10-17
Admitting: NURSE PRACTITIONER
Payer: COMMERCIAL

## 2024-10-17 DIAGNOSIS — R79.89 ABNORMALITY OF ADRENOCORTICOTROPIC HORMONE (ACTH): ICD-10-CM

## 2024-10-17 PROCEDURE — 0 GADOBENATE DIMEGLUMINE 529 MG/ML SOLUTION: Performed by: NURSE PRACTITIONER

## 2024-10-17 PROCEDURE — A9577 INJ MULTIHANCE: HCPCS | Performed by: NURSE PRACTITIONER

## 2024-10-17 PROCEDURE — 70553 MRI BRAIN STEM W/O & W/DYE: CPT

## 2024-10-17 RX ADMIN — GADOBENATE DIMEGLUMINE 20 ML: 529 INJECTION, SOLUTION INTRAVENOUS at 11:51

## 2024-10-21 DIAGNOSIS — R93.0 ABNORMAL MRI OF HEAD: Primary | ICD-10-CM

## 2024-10-29 NOTE — PROGRESS NOTES
Chief Complaint  Diabetes (Follow up, med mgt, CGM eval, A1c eval)    Referred By: DONNELL Burgos    Subjective          Carlota Robin presents to [facility] for diabetes medication management    History of Present Illness    Visit type:  follow-up  Diabetes type:  Type 2  Current diabetes status/concerns/issues:  Reports she did not notice a difference in her glucose levels after increasing her Mounjaro. States she has not lost any weight on the increased dose as well. She did have the dexamethasone suppression test which was abnormal so she had a MRI of the pituitary which showed a cyst. She is being referred to neurosurgery in [place] since neurosurgery in [facility] does not work on pituitary.   Other health concerns: No new health concerns  Current Diabetes symptoms:    Polyuria: No   Polydipsia: No   Polyphagia: Yes     Blurred vision: Yes     Excessive fatigue: Yes    Known Diabetes complications:  Neuropathy: None; Location: N/A  Renal: None  Eyes: None; Location: N/A; Last Eye Exam:  2022 ; Location: [facility]  Amputation/Wounds: None  GI: Reflux  Cardiovascular: Hypertension and Hyperlipidemia  ED: N/A  Other: None  Hypoglycemia:  None reported at this time and 0% per CGM  Hypoglycemia Symptoms:  No hypoglycemia at this time  Current diabetes treatment:   Mounjaro 15 mg weekly, Lantus 70 units bid, Humalog 18 units tid with meals   Blood glucose device:  Guroo CGM  Blood glucose monitoring frequency:  Continuous per CGM  Blood glucose range/average:  The 14-day sensor report shows an average glucose of 171mg/dL, with 63% in target range ( mgdL), 32% in the high range (181-250 mg/dL), 5% in the very high range (>250 mg/dL), 0% in the low range (54-70 mg/dL) and 0% in the very low range (<54 mg/dL).   Glucose Source: Device Reviewed  Dietary behavior:  Avoids sugary drinks, Number of meals each day - 3; Number of snacks each day - 1-2, she eliminated sugar  from her diet and decreased her carb intake.   Activity/Exercise: walking, stretches, dancing to the oldies    Past Medical History:   Diagnosis Date    Acne     Allergic     Allergic rhinitis due to allergen 04/15/2021    Allergies     Anemia 2020    Anesthesia     HARD TO WAKE UP POSTOP    Anxiety 04/15/2021    Asthma     INHALERS PRN    Bundle branch block, right     SEE'S DR BENNETT IN 2019, NOW SEES ON AS NEEDED BASIS, MAINLY FOLLOW PCP ROUTINELY. DENIED CP/SOB    Depression     Diabetes     Diabetes mellitus, type 2 2020    DOESN'T CHECK BG AT HOME    Essential hypertension 2020    Fatigue 04/15/2021    Fibromyalgia     GERD (gastroesophageal reflux disease) 2020    High cholesterol     Hyperlipidemia 2020    Hypothyroidism 2020    Inguinal hernia     Insomnia     Macromastia     Migraine     Obesity 2020    DANY (obstructive sleep apnea) 2020    Palpitation     NO PROBLEMS CURRENTLY. FOLLOWS PCP    Right bundle branch block 2020    SEE'S DR BLUE ON AS NEEDED BASIS, SEE'S PCP ON YEARLY. DENIED CP/SOB    RLS (restless legs syndrome)     Seasonal allergies     Sinus trouble     Stomach pain 2016    Stress 2016    Thyroid disease     Urinary incontinence     Vitamin B 12 deficiency     Vitamin D deficiency 2020     Past Surgical History:   Procedure Laterality Date    BILATERAL BREAST REDUCTION Bilateral 2022    Procedure: BREAST REDUCTION;  Surgeon: Zeeshan Bone MD;  [place] [facility] [place] [facility]  Service: Plastics;  Laterality: Bilateral;    BREAST BIOPSY Right      SECTION      CHOLECYSTECTOMY      COLONOSCOPY      CYSTOSCOPY BOTOX INJECTION OF BLADDER  2019    ENDOSCOPY      HERNIA REPAIR      HYSTERECTOMY  2017    WISDOM TOOTH EXTRACTION       Family History   Problem Relation Age of Onset    Stroke Mother     Diabetes Mother     Restless legs syndrome Mother     Hyperlipidemia Mother     Nephrolithiasis Father     Sleep  apnea Father     Depression Father     Restless legs syndrome Sister     No Known Problems Brother     No Known Problems Maternal Aunt     No Known Problems Paternal Aunt     Diabetes Maternal Uncle     No Known Problems Paternal Uncle     No Known Problems Maternal Grandfather     Colon cancer Maternal Grandmother         50S    Colon cancer Paternal Grandfather         70S    No Known Problems Paternal Grandmother     No Known Problems Cousin     Breast cancer Other         20S    Diabetes Other     ADD / ADHD Neg Hx     Alcohol abuse Neg Hx     Anxiety disorder Neg Hx     Bipolar disorder Neg Hx     Dementia Neg Hx     Drug abuse Neg Hx     OCD Neg Hx     Paranoid behavior Neg Hx     Schizophrenia Neg Hx     Seizures Neg Hx     Self-Injurious Behavior  Neg Hx     Suicide Attempts Neg Hx      Social History     Socioeconomic History    Marital status:      Spouse name: ELIZABETH DIAZ    Number of children: 2    Years of education: 12+    Highest education level: Some college, no degree   Tobacco Use    Smoking status: Never     Passive exposure: Past    Smokeless tobacco: Never    Tobacco comments:     SECOND HAND EXPOSURE    Vaping Use    Vaping status: Never Used   Substance and Sexual Activity    Alcohol use: Never    Drug use: Never    Sexual activity: Yes     Partners: Female     Birth control/protection: Hysterectomy     Allergies   Allergen Reactions    Pravastatin Sodium Nausea Only    Hydrocodone-Acetaminophen Hallucinations     OK TO TAKE REGULAR TYLENOL     Lisinopril Cough    Pollen Extract Other (See Comments)     CONGESTION, WATERY EYES, SNEEZING        Current Outpatient Medications:     albuterol (ProAir RespiClick) 108 (90 Base) MCG/ACT inhaler, Inhale 2 puffs Every 6 (Six) Hours As Needed for Wheezing or Shortness of Air., Disp: 18 g, Rfl: 2    amLODIPine (NORVASC) 10 MG tablet, Take 1 tablet by mouth daily., Disp: 30 tablet, Rfl: 5    ARIPiprazole (ABILIFY) 5 MG tablet, Take 1.5 tablets by  mouth Daily., Disp: 45 tablet, Rfl: 5    Blood Glucose Monitoring Suppl (FreeStyle Lite) w/Device kit, , Disp: , Rfl:     buPROPion XL (WELLBUTRIN XL) 150 MG 24 hr tablet, Take 1 tablet by mouth Daily., Disp: , Rfl:     Calcium Carbonate 1500 (600 Ca) MG tablet, Take 1 tablet by mouth Daily., Disp: 30 tablet, Rfl: 5    cetirizine (zyrTEC) 10 MG tablet, Take 1 tablet by mouth Daily., Disp: , Rfl:     cimetidine (TAGAMET) 200 MG tablet, Take 1 tablet by mouth 2 (Two) Times a Day Before Meals., Disp: 60 tablet, Rfl: 5    cloNIDine (CATAPRES) 0.1 MG tablet, Take 1 tablet by mouth every night., Disp: 30 tablet, Rfl: 5    Continuous Glucose Sensor (FreeStyle Harley 3 Sensor) [facility], Use as directed every 14 days, Disp: 2 each, Rfl: 5    cyanocobalamin (VITAMIN B-12) 1000 MCG tablet, Vitamin B-12 1,000 mcg oral tablet take 1 tablet by oral route daily   Active, Disp: , Rfl:     dexAMETHasone (DECADRON) 1 MG tablet, , Disp: , Rfl:     DULoxetine (CYMBALTA) 60 MG capsule, Take 1 capsule by mouth Daily., Disp: 30 capsule, Rfl: 5    empagliflozin (Jardiance) 10 MG tablet tablet, Take 1 tablet by mouth Daily for 30 days., Disp: 30 tablet, Rfl: 0    empagliflozin (Jardiance) 10 MG tablet tablet, Take 1 tablet by mouth Daily for 90 days., Disp: 30 tablet, Rfl: 2    ezetimibe (Zetia) 10 MG tablet, Take 1 tablet by mouth Daily., Disp: 30 tablet, Rfl: 5    FREESTYLE LITE test strip, Use as directed daily, Disp: 100 each, Rfl: 1    gabapentin (NEURONTIN) 100 MG capsule, Take 1 capsule by mouth 2 (Two) Times a Day As Needed (AS NEEDED FOR PAIN). 100MG TWICE A DAY, 400MG AT BEDTIME, Disp: , Rfl:     gabapentin (NEURONTIN) 400 MG capsule, Take 1 capsule by mouth Daily. 400MG AT BEDTIME, 100MG TWICE PER DAY, Disp: , Rfl:     hydroCHLOROthiazide 12.5 MG tablet, Take 1 tablet by mouth Daily., Disp: 30 tablet, Rfl: 5    Insulin Glargine (Lantus SoloStar) 100 UNIT/ML injection pen, Inject 70 Units under the skin into the appropriate area as  directed 2 (Two) Times a Day for 180 days., Disp: 126 mL, Rfl: 1    Insulin Lispro, 1 Unit Dial, (HUMALOG) 100 UNIT/ML solution pen-injector, Inject per sliding scale three times a day with meals. max daily dose of 40 units, Disp: 30 mL, Rfl: 1    Insulin Pen Needle (Pen Needles) 32G X 6 MM misc, Use 1 each 5 (Five) Times a Day., Disp: 450 each, Rfl: 1    iron polysaccharides (Ferrex 150) 150 MG capsule, Take 1 capsule by mouth Daily., Disp: 90 capsule, Rfl: 1    levothyroxine (SYNTHROID, LEVOTHROID) 25 MCG tablet, Take 1 tablet by mouth Daily., Disp: 30 tablet, Rfl: 5    lisinopril (PRINIVIL,ZESTRIL) 5 MG tablet, Take 1 tablet by mouth Daily., Disp: , Rfl:     losartan (COZAAR) 25 MG tablet, Take 1 tablet by mouth 2 (two) times a day., Disp: 60 tablet, Rfl: 0    meloxicam (MOBIC) 7.5 MG tablet, Take 1 tablet by mouth daily., Disp: 30 tablet, Rfl: 2    montelukast (SINGULAIR) 10 MG tablet, Take 1 tablet by mouth Every Evening., Disp: 30 tablet, Rfl: 5    ofloxacin (FLOXIN) 0.3 % otic solution, , Disp: , Rfl:     ondansetron (Zofran) 4 MG tablet, Take 1 tablet by mouth Every 8 (Eight) Hours As Needed for Nausea or Vomiting., Disp: 30 tablet, Rfl: 0    pantoprazole (PROTONIX) 40 MG EC tablet, Take 1 tablet by mouth Daily., Disp: 30 tablet, Rfl: 5    promethazine (PHENERGAN) 12.5 MG tablet, Take 1 tablet by mouth Every 6 (Six) Hours As Needed for Nausea or Vomiting., Disp: 20 tablet, Rfl: 0    saccharomyces boulardii (FLORASTOR) 250 MG capsule, Take 1 capsule by mouth 2 (Two) Times a Day., Disp: , Rfl:     solifenacin (VESICARE) 5 MG tablet, Vesicare 5 mg oral tablet take 1 tablet (5 mg) by oral route once daily   Suspended, Disp: , Rfl:     Tirzepatide (Mounjaro) 15 MG/0.5ML solution pen-injector pen, Inject 0.5 mL under the skin into the appropriate area as directed Every 7 (Seven) Days., Disp: 2 mL, Rfl: 5    topiramate (TOPAMAX) 50 MG tablet, , Disp: , Rfl:     traZODone (DESYREL) 50 MG tablet, Take 1 tablet by  "mouth every night., Disp: 30 tablet, Rfl: 5    vitamin D (ERGOCALCIFEROL) 1.25 MG (79941 UT) capsule capsule, Take 1 capsule by mouth 1 (One) Time Per Week., Disp: 5 capsule, Rfl: 5    Vitamin E 90 MG (200 UNIT) capsule, Take 1 capsule by mouth Daily., Disp: 30 capsule, Rfl: 5    Objective     Vitals:    10/30/24 1108   BP: 127/76   Pulse: 89   SpO2: 94%   Weight: 99 kg (218 lb 3.2 oz)   Height: 152.4 cm (60\")   PainSc: 0-No pain     Body mass index is 42.61 kg/m².    Physical Exam  Constitutional:       Appearance: Normal appearance. She is obese.      Comments: Severe Obesity (BMI >= 40) Pt Current BMI = 42.61     HENT:      Head: Normocephalic and atraumatic.      Right Ear: External ear normal.      Left Ear: External ear normal.      Nose: Nose normal.   Eyes:      Extraocular Movements: Extraocular movements intact.      Conjunctiva/sclera: Conjunctivae normal.   Pulmonary:      Effort: Pulmonary effort is normal.   Musculoskeletal:         General: Normal range of motion.      Cervical back: Normal range of motion.   Skin:     General: Skin is warm and dry.   Neurological:      General: No focal deficit present.      Mental Status: She is alert and oriented to person, place, and time. Mental status is at baseline.   Psychiatric:         Mood and Affect: Mood normal.         Behavior: Behavior normal.         Thought Content: Thought content normal.         Judgment: Judgment normal.             Result Review :   The following data was reviewed by: RAHEEM Garcia on 10/30/2024:    Most Recent A1C          10/30/2024    11:16   HGBA1C Most Recent   Hemoglobin A1C 7.7        A1C Last 3 Results          4/11/2024    10:53 7/25/2024    10:29 10/30/2024    11:16   HGBA1C Last 3 Results   Hemoglobin A1C 9.0  7.8  7.7      A1c collected in the office today is 7.7%, indicating Uncontrolled Type II diabetes.  This result is down from the prior result of 7.8% collected on 7/25/2024    Glucose   Date Value " Ref Range Status   10/30/2024 269 (H) 70 - 99 mg/dL Final     Comment:     Serial Number: [id number]:  [id number]     Point of care glucose in the office today is  elevated at 269 mg/dL.    Creatinine   Date Value Ref Range Status   05/15/2024 0.72 0.57 - 1.00 mg/dL Final   11/15/2023 0.62 0.57 - 1.00 mg/dL Final     eGFR   Date Value Ref Range Status   05/15/2024 99.5 >60.0 mL/min/1.73 Final   11/15/2023 106.0 >60.0 mL/min/1.73 Final     Labs collected on 5/15/24 show Normal values      Total Cholesterol   Date Value Ref Range Status   05/15/2024 208 (H) 0 - 200 mg/dL Final   11/15/2023 251 (H) 0 - 200 mg/dL Final     Triglycerides   Date Value Ref Range Status   05/15/2024 346 (H) 0 - 150 mg/dL Final   11/15/2023 530 (H) 0 - 150 mg/dL Final     HDL Cholesterol   Date Value Ref Range Status   05/15/2024 33 (L) 40 - 60 mg/dL Final   11/15/2023 34 (L) 40 - 60 mg/dL Final     LDL Cholesterol    Date Value Ref Range Status   05/15/2024 115 (H) 0 - 100 mg/dL Final   11/15/2023 122 (H) 0 - 100 mg/dL Final     Lipid panel collected on 5/15/24 shows Hyperlipidemia, Hypercholesterolemia, Hypertriglyceridemia, and low HDL            Assessment: Patient is here today for 3-month follow-up on her diabetes.  Last visit her Mounjaro was increased to 15 mg once weekly and a dexamethasone suppression test was ordered. Reports she did not notice a difference in her glucose levels after increasing her Mounjaro. States she has not lost any weight on the increased dose as well. She did have the dexamethasone suppression test which was abnormal so she had a MRI of the pituitary which showed a cyst. She is being referred to neurosurgery in [place] since neurosurgery in [facility] does not work on pituitary.  Reviewed CGM report with patient the office today.The 14-day sensor report shows an average glucose of 171mg/dL, with 63% in target range ( mgdL), 32% in the high range (181-250 mg/dL), 5% in the very high range (>250  mg/dL), 0% in the low range (54-70 mg/dL) and 0% in the very low range (<54 mg/dL).  Her A1c in the office today is 7.7% which is down from her previous A1c of 7.8% in July.      Diagnoses and all orders for this visit:    1. Type 2 diabetes mellitus with hyperglycemia, unspecified whether long term insulin use (Primary)  -     POC Glycosylated Hemoglobin (Hb A1C)  -     empagliflozin (Jardiance) 10 MG tablet tablet; Take 1 tablet by mouth Daily for 30 days.  Dispense: 30 tablet; Refill: 0  -     empagliflozin (Jardiance) 10 MG tablet tablet; Take 1 tablet by mouth Daily for 90 days.  Dispense: 30 tablet; Refill: 2  -     Insulin Pen Needle (Pen Needles) 32G X 6 MM misc; Use 1 each 5 (Five) Times a Day.  Dispense: 450 each; Refill: 1  -     Microalbumin / Creatinine Urine Ratio - Urine, Clean Catch    2. Abnormal MRI of head    3. Abnormality of adrenocorticotropic hormone (ACTH)    4. Type 2 diabetes mellitus with hyperglycemia, with long-term current use of insulin    5. Uncontrolled type 2 diabetes mellitus with hyperglycemia    6. Weight gain    7. Severe obesity (BMI >= 40)    8. Abnormal cortisol level    9. Uses self-applied continuous glucose monitoring device    Other orders  -     POC Glucose        Plan: Prescribed Jardiance 10 mg once daily.  Instructed patient to drink plenty water with this medication to avoid dehydration, yeast infections and UTIs.  Instructed patient if she develops any the symptoms to please contact our office.  A 1 month sample of Jardiance was given to the patient and a prescription was sent to her pharmacy.  We will check on her referral to neurosurgery and [facility].  Scheduled a 1 month follow-up and we will review her CGM at that time.    The patient will monitor her blood glucose levels per CGM.  If she develops problematic hyperglycemia or hypoglycemia or adverse drug reactions, she will contact the office for further instructions.        Follow Up     Return in about 4 weeks  (around 11/27/2024) for Medication Mgmt, CGM Start.    Patient was given instructions and counseling regarding her condition or for health maintenance advice. Please see specific information pulled into the AVS if appropriate.     Marilee Laughlin, APRN  10/30/2024      Dictated Utilizing Dragon Dictation.  Please note that portions of this note were completed with a voice recognition program.  Part of this note may be an electronic transcription/translation of spoken language to printed text using the Dragon Dictation System.

## 2024-10-30 ENCOUNTER — OFFICE VISIT (OUTPATIENT)
Dept: DIABETES SERVICES | Facility: HOSPITAL | Age: 54
End: 2024-10-30
Payer: COMMERCIAL

## 2024-10-30 VITALS
BODY MASS INDEX: 42.84 KG/M2 | OXYGEN SATURATION: 94 % | HEIGHT: 60 IN | SYSTOLIC BLOOD PRESSURE: 127 MMHG | WEIGHT: 218.2 LBS | HEART RATE: 89 BPM | DIASTOLIC BLOOD PRESSURE: 76 MMHG

## 2024-10-30 DIAGNOSIS — E11.65 TYPE 2 DIABETES MELLITUS WITH HYPERGLYCEMIA, UNSPECIFIED WHETHER LONG TERM INSULIN USE: Primary | ICD-10-CM

## 2024-10-30 DIAGNOSIS — E66.01 SEVERE OBESITY (BMI >= 40): ICD-10-CM

## 2024-10-30 DIAGNOSIS — Z79.4 TYPE 2 DIABETES MELLITUS WITH HYPERGLYCEMIA, WITH LONG-TERM CURRENT USE OF INSULIN: ICD-10-CM

## 2024-10-30 DIAGNOSIS — R79.89 ABNORMAL CORTISOL LEVEL: ICD-10-CM

## 2024-10-30 DIAGNOSIS — R63.5 WEIGHT GAIN: ICD-10-CM

## 2024-10-30 DIAGNOSIS — R93.0 ABNORMAL MRI OF HEAD: ICD-10-CM

## 2024-10-30 DIAGNOSIS — E11.65 UNCONTROLLED TYPE 2 DIABETES MELLITUS WITH HYPERGLYCEMIA: ICD-10-CM

## 2024-10-30 DIAGNOSIS — R79.89 ABNORMALITY OF ADRENOCORTICOTROPIC HORMONE (ACTH): ICD-10-CM

## 2024-10-30 DIAGNOSIS — Z97.8 USES SELF-APPLIED CONTINUOUS GLUCOSE MONITORING DEVICE: ICD-10-CM

## 2024-10-30 DIAGNOSIS — E11.65 TYPE 2 DIABETES MELLITUS WITH HYPERGLYCEMIA, WITH LONG-TERM CURRENT USE OF INSULIN: ICD-10-CM

## 2024-10-30 LAB
EXPIRATION DATE: ABNORMAL
GLUCOSE BLDC GLUCOMTR-MCNC: 269 MG/DL (ref 70–99)
HBA1C MFR BLD: 7.7 % (ref 4.5–5.7)
Lab: ABNORMAL

## 2024-10-30 PROCEDURE — 82043 UR ALBUMIN QUANTITATIVE: CPT | Performed by: NURSE PRACTITIONER

## 2024-10-30 PROCEDURE — 3074F SYST BP LT 130 MM HG: CPT | Performed by: NURSE PRACTITIONER

## 2024-10-30 PROCEDURE — 83036 HEMOGLOBIN GLYCOSYLATED A1C: CPT | Performed by: NURSE PRACTITIONER

## 2024-10-30 PROCEDURE — 1159F MED LIST DOCD IN RCRD: CPT | Performed by: NURSE PRACTITIONER

## 2024-10-30 PROCEDURE — 82570 ASSAY OF URINE CREATININE: CPT | Performed by: NURSE PRACTITIONER

## 2024-10-30 PROCEDURE — 95251 CONT GLUC MNTR ANALYSIS I&R: CPT | Performed by: NURSE PRACTITIONER

## 2024-10-30 PROCEDURE — G0463 HOSPITAL OUTPT CLINIC VISIT: HCPCS | Performed by: NURSE PRACTITIONER

## 2024-10-30 PROCEDURE — 82948 REAGENT STRIP/BLOOD GLUCOSE: CPT | Performed by: NURSE PRACTITIONER

## 2024-10-30 PROCEDURE — 3051F HG A1C>EQUAL 7.0%<8.0%: CPT | Performed by: NURSE PRACTITIONER

## 2024-10-30 PROCEDURE — 99214 OFFICE O/P EST MOD 30 MIN: CPT | Performed by: NURSE PRACTITIONER

## 2024-10-30 PROCEDURE — 3078F DIAST BP <80 MM HG: CPT | Performed by: NURSE PRACTITIONER

## 2024-10-30 PROCEDURE — 1160F RVW MEDS BY RX/DR IN RCRD: CPT | Performed by: NURSE PRACTITIONER

## 2024-10-30 RX ORDER — BLOOD SUGAR DIAGNOSTIC
1 STRIP MISCELLANEOUS
Qty: 450 EACH | Refills: 1 | Status: SHIPPED | OUTPATIENT
Start: 2024-10-30

## 2024-10-30 NOTE — PATIENT INSTRUCTIONS
Start Jardiance 10 mg once daily.  Make sure to drink plenty water with this medication to avoid dehydration, yeast infections and UTIs.  If you develop any the symptoms please contact our office.

## 2024-10-31 LAB
ALBUMIN UR-MCNC: <1.2 MG/DL
CREAT UR-MCNC: 41.5 MG/DL
MICROALBUMIN/CREAT UR: NORMAL MG/G{CREAT}

## 2024-11-14 ENCOUNTER — OFFICE VISIT (OUTPATIENT)
Dept: FAMILY MEDICINE CLINIC | Facility: CLINIC | Age: 54
End: 2024-11-14
Payer: COMMERCIAL

## 2024-11-14 VITALS
DIASTOLIC BLOOD PRESSURE: 74 MMHG | RESPIRATION RATE: 16 BRPM | SYSTOLIC BLOOD PRESSURE: 113 MMHG | BODY MASS INDEX: 41.94 KG/M2 | WEIGHT: 213.6 LBS | HEART RATE: 89 BPM | TEMPERATURE: 97.6 F | OXYGEN SATURATION: 96 % | HEIGHT: 60 IN

## 2024-11-14 DIAGNOSIS — Z86.79 HISTORY OF ANEURYSM: ICD-10-CM

## 2024-11-14 DIAGNOSIS — J30.2 SEASONAL ALLERGIC RHINITIS, UNSPECIFIED TRIGGER: ICD-10-CM

## 2024-11-14 DIAGNOSIS — L98.9 SKIN LESION: Primary | ICD-10-CM

## 2024-11-14 DIAGNOSIS — G47.09 OTHER INSOMNIA: ICD-10-CM

## 2024-11-14 DIAGNOSIS — M79.7 FIBROMYALGIA: ICD-10-CM

## 2024-11-14 DIAGNOSIS — E55.9 VITAMIN D DEFICIENCY: ICD-10-CM

## 2024-11-14 DIAGNOSIS — D50.9 IRON DEFICIENCY ANEMIA, UNSPECIFIED IRON DEFICIENCY ANEMIA TYPE: ICD-10-CM

## 2024-11-14 DIAGNOSIS — K21.9 GASTROESOPHAGEAL REFLUX DISEASE WITHOUT ESOPHAGITIS: ICD-10-CM

## 2024-11-14 DIAGNOSIS — F41.9 ANXIETY: ICD-10-CM

## 2024-11-14 DIAGNOSIS — E78.5 HYPERLIPIDEMIA, UNSPECIFIED HYPERLIPIDEMIA TYPE: ICD-10-CM

## 2024-11-14 DIAGNOSIS — R25.1 SHAKING: ICD-10-CM

## 2024-11-14 DIAGNOSIS — E03.9 ACQUIRED HYPOTHYROIDISM: ICD-10-CM

## 2024-11-14 DIAGNOSIS — I10 PRIMARY HYPERTENSION: ICD-10-CM

## 2024-11-14 LAB
25(OH)D3 SERPL-MCNC: 43.6 NG/ML (ref 30–100)
ALBUMIN SERPL-MCNC: 4.2 G/DL (ref 3.5–5.2)
ALBUMIN/GLOB SERPL: 1.3 G/DL
ALP SERPL-CCNC: 103 U/L (ref 39–117)
ALT SERPL W P-5'-P-CCNC: 26 U/L (ref 1–33)
ANION GAP SERPL CALCULATED.3IONS-SCNC: 12.9 MMOL/L (ref 5–15)
AST SERPL-CCNC: 29 U/L (ref 1–32)
BASOPHILS # BLD AUTO: 0.07 10*3/MM3 (ref 0–0.2)
BASOPHILS NFR BLD AUTO: 0.9 % (ref 0–1.5)
BILIRUB SERPL-MCNC: 0.3 MG/DL (ref 0–1.2)
BUN SERPL-MCNC: 11 MG/DL (ref 6–20)
BUN/CREAT SERPL: 14.5 (ref 7–25)
CALCIUM SPEC-SCNC: 9.3 MG/DL (ref 8.6–10.5)
CHLORIDE SERPL-SCNC: 101 MMOL/L (ref 98–107)
CHOLEST SERPL-MCNC: 235 MG/DL (ref 0–200)
CO2 SERPL-SCNC: 24.1 MMOL/L (ref 22–29)
CREAT SERPL-MCNC: 0.76 MG/DL (ref 0.57–1)
DEPRECATED RDW RBC AUTO: 42.7 FL (ref 37–54)
EGFRCR SERPLBLD CKD-EPI 2021: 92.7 ML/MIN/1.73
EOSINOPHIL # BLD AUTO: 0.23 10*3/MM3 (ref 0–0.4)
EOSINOPHIL NFR BLD AUTO: 2.8 % (ref 0.3–6.2)
ERYTHROCYTE [DISTWIDTH] IN BLOOD BY AUTOMATED COUNT: 14.1 % (ref 12.3–15.4)
FERRITIN SERPL-MCNC: 149 NG/ML (ref 13–150)
FOLATE SERPL-MCNC: 14.7 NG/ML (ref 4.78–24.2)
GLOBULIN UR ELPH-MCNC: 3.2 GM/DL
GLUCOSE SERPL-MCNC: 101 MG/DL (ref 65–99)
HCT VFR BLD AUTO: 42.4 % (ref 34–46.6)
HDLC SERPL QL: 6.71
HDLC SERPL-MCNC: 35 MG/DL (ref 40–60)
HGB BLD-MCNC: 14.4 G/DL (ref 12–15.9)
IMM GRANULOCYTES # BLD AUTO: 0.04 10*3/MM3 (ref 0–0.05)
IMM GRANULOCYTES NFR BLD AUTO: 0.5 % (ref 0–0.5)
IRON 24H UR-MRATE: 74 MCG/DL (ref 37–145)
IRON SATN MFR SERPL: 16 % (ref 20–50)
LDLC SERPL CALC-MCNC: 137 MG/DL (ref 0–100)
LYMPHOCYTES # BLD AUTO: 3 10*3/MM3 (ref 0.7–3.1)
LYMPHOCYTES NFR BLD AUTO: 37.1 % (ref 19.6–45.3)
MCH RBC QN AUTO: 28.6 PG (ref 26.6–33)
MCHC RBC AUTO-ENTMCNC: 34 G/DL (ref 31.5–35.7)
MCV RBC AUTO: 84.3 FL (ref 79–97)
MONOCYTES # BLD AUTO: 0.76 10*3/MM3 (ref 0.1–0.9)
MONOCYTES NFR BLD AUTO: 9.4 % (ref 5–12)
NEUTROPHILS NFR BLD AUTO: 3.99 10*3/MM3 (ref 1.7–7)
NEUTROPHILS NFR BLD AUTO: 49.3 % (ref 42.7–76)
NRBC BLD AUTO-RTO: 0 /100 WBC (ref 0–0.2)
PLATELET # BLD AUTO: 328 10*3/MM3 (ref 140–450)
PMV BLD AUTO: 10.1 FL (ref 6–12)
POTASSIUM SERPL-SCNC: 3.7 MMOL/L (ref 3.5–5.2)
PROT SERPL-MCNC: 7.4 G/DL (ref 6–8.5)
RBC # BLD AUTO: 5.03 10*6/MM3 (ref 3.77–5.28)
SODIUM SERPL-SCNC: 138 MMOL/L (ref 136–145)
T3FREE SERPL-MCNC: 3.3 PG/ML (ref 2–4.4)
T4 FREE SERPL-MCNC: 1.16 NG/DL (ref 0.92–1.68)
TIBC SERPL-MCNC: 462 MCG/DL (ref 298–536)
TRANSFERRIN SERPL-MCNC: 310 MG/DL (ref 200–360)
TRIGL SERPL-MCNC: 348 MG/DL (ref 0–150)
TSH SERPL DL<=0.05 MIU/L-ACNC: 3.71 UIU/ML (ref 0.27–4.2)
VIT B12 BLD-MCNC: >2000 PG/ML (ref 211–946)
VLDLC SERPL-MCNC: 63 MG/DL (ref 5–40)
WBC NRBC COR # BLD AUTO: 8.09 10*3/MM3 (ref 3.4–10.8)

## 2024-11-14 PROCEDURE — 82306 VITAMIN D 25 HYDROXY: CPT | Performed by: NURSE PRACTITIONER

## 2024-11-14 PROCEDURE — 82728 ASSAY OF FERRITIN: CPT | Performed by: NURSE PRACTITIONER

## 2024-11-14 PROCEDURE — 82607 VITAMIN B-12: CPT | Performed by: NURSE PRACTITIONER

## 2024-11-14 PROCEDURE — 85025 COMPLETE CBC W/AUTO DIFF WBC: CPT | Performed by: NURSE PRACTITIONER

## 2024-11-14 PROCEDURE — 84443 ASSAY THYROID STIM HORMONE: CPT | Performed by: NURSE PRACTITIONER

## 2024-11-14 PROCEDURE — 84481 FREE ASSAY (FT-3): CPT | Performed by: NURSE PRACTITIONER

## 2024-11-14 PROCEDURE — 84439 ASSAY OF FREE THYROXINE: CPT | Performed by: NURSE PRACTITIONER

## 2024-11-14 PROCEDURE — 83540 ASSAY OF IRON: CPT | Performed by: NURSE PRACTITIONER

## 2024-11-14 PROCEDURE — 84466 ASSAY OF TRANSFERRIN: CPT | Performed by: NURSE PRACTITIONER

## 2024-11-14 PROCEDURE — 80053 COMPREHEN METABOLIC PANEL: CPT | Performed by: NURSE PRACTITIONER

## 2024-11-14 PROCEDURE — 82746 ASSAY OF FOLIC ACID SERUM: CPT | Performed by: NURSE PRACTITIONER

## 2024-11-14 PROCEDURE — 80061 LIPID PANEL: CPT | Performed by: NURSE PRACTITIONER

## 2024-11-14 RX ORDER — VITAMIN E (DL,TOCOPHERYL ACET) 90 MG
1 CAPSULE ORAL DAILY
Qty: 30 CAPSULE | Refills: 5 | Status: SHIPPED | OUTPATIENT
Start: 2024-11-14

## 2024-11-14 RX ORDER — LEVOTHYROXINE SODIUM 25 UG/1
25 TABLET ORAL DAILY
Qty: 30 TABLET | Refills: 5 | Status: SHIPPED | OUTPATIENT
Start: 2024-11-14

## 2024-11-14 RX ORDER — LOSARTAN POTASSIUM 25 MG/1
25 TABLET ORAL 2 TIMES DAILY
Qty: 60 TABLET | Refills: 5 | Status: SHIPPED | OUTPATIENT
Start: 2024-11-14

## 2024-11-14 RX ORDER — MELOXICAM 7.5 MG/1
7.5 TABLET ORAL DAILY
Qty: 30 TABLET | Refills: 5 | Status: SHIPPED | OUTPATIENT
Start: 2024-11-14

## 2024-11-14 RX ORDER — EZETIMIBE 10 MG/1
10 TABLET ORAL DAILY
Qty: 30 TABLET | Refills: 5 | Status: SHIPPED | OUTPATIENT
Start: 2024-11-14

## 2024-11-14 RX ORDER — HYDROCHLOROTHIAZIDE 12.5 MG/1
12.5 TABLET ORAL DAILY
Qty: 30 TABLET | Refills: 5 | Status: SHIPPED | OUTPATIENT
Start: 2024-11-14

## 2024-11-14 RX ORDER — ERGOCALCIFEROL 1.25 MG/1
50000 CAPSULE, LIQUID FILLED ORAL WEEKLY
Qty: 5 CAPSULE | Refills: 5 | Status: SHIPPED | OUTPATIENT
Start: 2024-11-14

## 2024-11-14 RX ORDER — CLONIDINE HYDROCHLORIDE 0.1 MG/1
0.1 TABLET ORAL NIGHTLY
Qty: 30 TABLET | Refills: 5 | Status: SHIPPED | OUTPATIENT
Start: 2024-11-14

## 2024-11-14 RX ORDER — TRAZODONE HYDROCHLORIDE 50 MG/1
50 TABLET, FILM COATED ORAL NIGHTLY
Qty: 30 TABLET | Refills: 5 | Status: SHIPPED | OUTPATIENT
Start: 2024-11-14

## 2024-11-14 RX ORDER — MONTELUKAST SODIUM 10 MG/1
10 TABLET ORAL EVERY EVENING
Qty: 30 TABLET | Refills: 5 | Status: SHIPPED | OUTPATIENT
Start: 2024-11-14

## 2024-11-14 RX ORDER — PANTOPRAZOLE SODIUM 40 MG/1
40 TABLET, DELAYED RELEASE ORAL DAILY
Qty: 30 TABLET | Refills: 5 | Status: SHIPPED | OUTPATIENT
Start: 2024-11-14

## 2024-11-14 RX ORDER — DULOXETIN HYDROCHLORIDE 60 MG/1
60 CAPSULE, DELAYED RELEASE ORAL DAILY
Qty: 30 CAPSULE | Refills: 5 | Status: SHIPPED | OUTPATIENT
Start: 2024-11-14

## 2024-11-14 RX ORDER — IRON POLYSACCHARIDE COMPLEX 150 MG
150 CAPSULE ORAL DAILY
Qty: 30 CAPSULE | Refills: 5 | Status: SHIPPED | OUTPATIENT
Start: 2024-11-14

## 2024-11-14 RX ORDER — ARIPIPRAZOLE 5 MG/1
7.5 TABLET ORAL DAILY
Qty: 45 TABLET | Refills: 5 | Status: SHIPPED | OUTPATIENT
Start: 2024-11-14

## 2024-11-14 RX ORDER — AMLODIPINE BESYLATE 10 MG/1
10 TABLET ORAL DAILY
Qty: 30 TABLET | Refills: 5 | Status: CANCELLED | OUTPATIENT
Start: 2024-11-14

## 2024-11-14 RX ORDER — AMLODIPINE BESYLATE 5 MG/1
5 TABLET ORAL DAILY
Qty: 30 TABLET | Refills: 5 | Status: SHIPPED | OUTPATIENT
Start: 2024-11-14

## 2024-11-14 NOTE — ASSESSMENT & PLAN NOTE
Orders:    cloNIDine (CATAPRES) 0.1 MG tablet; Take 1 tablet by mouth Every Night.    hydroCHLOROthiazide 12.5 MG tablet; Take 1 tablet by mouth Daily.    losartan (COZAAR) 25 MG tablet; Take 1 tablet by mouth 2 (Two) Times a Day.    CBC Auto Differential    Comprehensive Metabolic Panel    Lipid Panel With / Chol / HDL Ratio    amLODIPine (Norvasc) 5 MG tablet; Take 1 tablet by mouth Daily.

## 2024-11-14 NOTE — ASSESSMENT & PLAN NOTE
Orders:    Calcium Carbonate 1500 (600 Ca) MG tablet; Take 1 tablet by mouth Daily.    vitamin D (ERGOCALCIFEROL) 1.25 MG (80160 UT) capsule capsule; Take 1 capsule by mouth 1 (One) Time Per Week.    Vitamin D,25-Hydroxy    Vitamin B12 & Folate

## 2024-11-14 NOTE — ASSESSMENT & PLAN NOTE
Orders:    ezetimibe (Zetia) 10 MG tablet; Take 1 tablet by mouth Daily.    CBC Auto Differential    Comprehensive Metabolic Panel    Lipid Panel With / Chol / HDL Ratio

## 2024-11-14 NOTE — ASSESSMENT & PLAN NOTE
Orders:    DULoxetine (CYMBALTA) 60 MG capsule; Take 1 capsule by mouth Daily.    meloxicam (MOBIC) 7.5 MG tablet; Take 1 tablet by mouth Daily.    Vitamin E 90 MG (200 UNIT) capsule; Take 1 capsule by mouth Daily.

## 2024-11-14 NOTE — ASSESSMENT & PLAN NOTE
Orders:    cimetidine (TAGAMET) 200 MG tablet; Take 1 tablet by mouth 2 (Two) Times a Day Before Meals.    pantoprazole (PROTONIX) 40 MG EC tablet; Take 1 tablet by mouth Daily.

## 2024-11-14 NOTE — PROGRESS NOTES
Answers submitted by the patient for this visit:  Other (Submitted on 11/7/2024)  Please describe your symptoms.: follow up  Have you had these symptoms before?: No  How long have you been having these symptoms?: 1-4 days  Primary Reason for Visit (Submitted on 11/7/2024)  What is the primary reason for your visit?: Problem Not Listed  Chief Complaint  Hyperlipidemia, Hypertension, Hypothyroidism, and Diabetes    Subjective          Carlota Robin presents to [facility]  History of Present Illness  Hypertension: She is well controlled with her norvasc and hydrochlorothiazide.  She also takes her amlodipine daily.  No chest pain or shortness of breath noted.  She has a history of an aneurysm she said it is on her left side behind her eye.  She cannot remember the year but she remembers it was at [facility].  She is also having increased of shaking and she worries with an aneurysm.  She would also like to be referred back to dermatology for just an overall check on her skin  Vitamin D deficiency and vitamin B12 deficiency: She is taking her vitamin D on a weekly basis.    Allergies: She is taking her Singulair daily.    Diabetes: She had her A1c checked by endocrinology and below 9 which is doing great  Fibro-: She is taking her Mobic and her Cymbalta on a daily basis.  It is doing well controlled currently.    Thyroid: She is taking her levothyroxine daily and is due lab work today.  Anemia: She takes her iron on a daily basis and is needing iron profile today along with ferritin to see how her levels are.  Anxiety: She is taking the Cymbalta also for the anxiety and the Abilify is doing ok.   GERD: She is taking her Tagamet every day morning and night.  No nausea or vomiting.                 Allergies  Pravastatin sodium, Hydrocodone-acetaminophen, Lisinopril, and Pollen extract    Social History     Tobacco Use    Smoking status: Never     Passive exposure: Past    Smokeless tobacco:  Never    Tobacco comments:     SECOND HAND EXPOSURE    Vaping Use    Vaping status: Never Used   Substance Use Topics    Alcohol use: Never    Drug use: Never       Family History   Problem Relation Age of Onset    Stroke Mother     Diabetes Mother     Restless legs syndrome Mother     Hyperlipidemia Mother     Nephrolithiasis Father     Sleep apnea Father     Depression Father     Restless legs syndrome Sister     No Known Problems Brother     No Known Problems Maternal Aunt     No Known Problems Paternal Aunt     Diabetes Maternal Uncle     No Known Problems Paternal Uncle     No Known Problems Maternal Grandfather     Colon cancer Maternal Grandmother         50S    Colon cancer Paternal Grandfather         70S    No Known Problems Paternal Grandmother     No Known Problems Cousin     Breast cancer Other         20S    Diabetes Other     ADD / ADHD Neg Hx     Alcohol abuse Neg Hx     Anxiety disorder Neg Hx     Bipolar disorder Neg Hx     Dementia Neg Hx     Drug abuse Neg Hx     OCD Neg Hx     Paranoid behavior Neg Hx     Schizophrenia Neg Hx     Seizures Neg Hx     Self-Injurious Behavior  Neg Hx     Suicide Attempts Neg Hx         Health Maintenance Due   Topic Date Due    Hepatitis B (1 of 3 - 19+ 3-dose series) Never done    ZOSTER VACCINE (1 of 2) Never done    BMI FOLLOWUP  09/18/2024    DIABETIC EYE EXAM  11/06/2024    DIABETIC FOOT EXAM  11/15/2024        Immunization History   Administered Date(s) Administered    Influenza, Unspecified 11/25/2019    Tdap 11/25/2019       Review of Systems   Constitutional:  Negative for fatigue.   Respiratory:  Negative for cough and shortness of breath.    Cardiovascular:  Negative for chest pain.   Gastrointestinal:  Negative for diarrhea, nausea and vomiting.   Neurological:  Positive for tremors.        Objective       Vitals:    11/14/24 1018   BP: 113/74   Pulse: 89   Resp: 16   Temp: 97.6 °F (36.4 °C)   SpO2: 96%   Weight: 96.9 kg (213 lb 9.6 oz)   Height: 152.4  "cm (60\")       Body mass index is 41.72 kg/m².         Physical Exam          Result Review :     The following data was reviewed by: RAHEEM Burgos on 11/14/2024:            MRI Pituitary With & Without Contrast    Result Date: 10/20/2024  Impression: Tiny 2 mm cystic findings finding just off midline seen centrally within the pituitary, favoring pars intermedia cyst over tiny cystic microadenoma. Otherwise normal contrast-enhanced MRI of the brain. Electronically Signed: Delfino Aguilar MD  10/20/2024 3:11 PM EDT  Workstation ID: [id number]    Mammo Diagnostic Digital Tomosynthesis Right With CAD    Result Date: 9/23/2024  Probable benign right mammogram. Recommend a follow-up right diagnostic mammogram in 6 months. The patient will be due for a screening examination of the left breast.    TISSUE DENSITY:  There are scattered areas of fibroglandular density.  BI-RADS ASSESSMENT: BI-RADS 3. Probably Benign.  Short interval followup recommended.   Note: It has been reported that there is approximately a 15% false negative in mammography. Therefore, management of a palpable abnormality should not be deferred because of a negative mammogram.    Electronically Signed By-DIEGO FISCHER MD On:9/23/2024 1:21 PM                  Assessment and Plan      Assessment & Plan  Primary hypertension      Orders:    cloNIDine (CATAPRES) 0.1 MG tablet; Take 1 tablet by mouth Every Night.    hydroCHLOROthiazide 12.5 MG tablet; Take 1 tablet by mouth Daily.    losartan (COZAAR) 25 MG tablet; Take 1 tablet by mouth 2 (Two) Times a Day.    CBC Auto Differential    Comprehensive Metabolic Panel    Lipid Panel With / Chol / HDL Ratio    amLODIPine (Norvasc) 5 MG tablet; Take 1 tablet by mouth Daily.    Anxiety    Orders:    ARIPiprazole (ABILIFY) 5 MG tablet; Take 1.5 tablets by mouth Daily.    DULoxetine (CYMBALTA) 60 MG capsule; Take 1 capsule by mouth Daily.    Vitamin D deficiency    Orders:    Calcium Carbonate 1500 (600 Ca) " MG tablet; Take 1 tablet by mouth Daily.    vitamin D (ERGOCALCIFEROL) 1.25 MG (49259 UT) capsule capsule; Take 1 capsule by mouth 1 (One) Time Per Week.    Vitamin D,25-Hydroxy    Vitamin B12 & Folate    Gastroesophageal reflux disease without esophagitis    Orders:    cimetidine (TAGAMET) 200 MG tablet; Take 1 tablet by mouth 2 (Two) Times a Day Before Meals.    pantoprazole (PROTONIX) 40 MG EC tablet; Take 1 tablet by mouth Daily.    Fibromyalgia    Orders:    DULoxetine (CYMBALTA) 60 MG capsule; Take 1 capsule by mouth Daily.    meloxicam (MOBIC) 7.5 MG tablet; Take 1 tablet by mouth Daily.    Vitamin E 90 MG (200 UNIT) capsule; Take 1 capsule by mouth Daily.    Hyperlipidemia, unspecified hyperlipidemia type       Orders:    ezetimibe (Zetia) 10 MG tablet; Take 1 tablet by mouth Daily.    CBC Auto Differential    Comprehensive Metabolic Panel    Lipid Panel With / Chol / HDL Ratio    Iron deficiency anemia, unspecified iron deficiency anemia type    Orders:    iron polysaccharides (Ferrex 150) 150 MG capsule; Take 1 capsule by mouth Daily.    TSH    Iron Profile    Acquired hypothyroidism    Orders:    levothyroxine (SYNTHROID, LEVOTHROID) 25 MCG tablet; Take 1 tablet by mouth Daily.    T4, Free    T3, Free    Ferritin    Seasonal allergic rhinitis, unspecified trigger    Orders:    montelukast (SINGULAIR) 10 MG tablet; Take 1 tablet by mouth Every Evening.    Other insomnia    Orders:    traZODone (DESYREL) 50 MG tablet; Take 1 tablet by mouth Every Night.    Skin lesion    Orders:    Ambulatory Referral to Dermatology    History of aneurysm    Orders:    MRI Angiogram Head Without Contrast; Future    Shaking    Orders:    MRI Brain With & Without Contrast; Future       Diagnosis Plan   1. Skin lesion  Ambulatory Referral to Dermatology      2. Primary hypertension  cloNIDine (CATAPRES) 0.1 MG tablet    hydroCHLOROthiazide 12.5 MG tablet    losartan (COZAAR) 25 MG tablet    CBC Auto Differential     Comprehensive Metabolic Panel    Lipid Panel With / Chol / HDL Ratio    amLODIPine (Norvasc) 5 MG tablet      3. Anxiety  ARIPiprazole (ABILIFY) 5 MG tablet    DULoxetine (CYMBALTA) 60 MG capsule      4. Vitamin D deficiency  Calcium Carbonate 1500 (600 Ca) MG tablet    vitamin D (ERGOCALCIFEROL) 1.25 MG (69580 UT) capsule capsule    Vitamin D,25-Hydroxy    Vitamin B12 & Folate      5. Gastroesophageal reflux disease without esophagitis  cimetidine (TAGAMET) 200 MG tablet    pantoprazole (PROTONIX) 40 MG EC tablet      6. Fibromyalgia  DULoxetine (CYMBALTA) 60 MG capsule    meloxicam (MOBIC) 7.5 MG tablet    Vitamin E 90 MG (200 UNIT) capsule      7. Hyperlipidemia, unspecified hyperlipidemia type  ezetimibe (Zetia) 10 MG tablet    CBC Auto Differential    Comprehensive Metabolic Panel    Lipid Panel With / Chol / HDL Ratio      8. Iron deficiency anemia, unspecified iron deficiency anemia type  iron polysaccharides (Ferrex 150) 150 MG capsule    TSH    Iron Profile      9. Acquired hypothyroidism  levothyroxine (SYNTHROID, LEVOTHROID) 25 MCG tablet    T4, Free    T3, Free    Ferritin      10. Seasonal allergic rhinitis, unspecified trigger  montelukast (SINGULAIR) 10 MG tablet      11. Other insomnia  traZODone (DESYREL) 50 MG tablet      12. History of aneurysm  MRI Angiogram Head Without Contrast      13. Shaking  MRI Brain With & Without Contrast                Follow Up     Return in about 6 months (around 5/14/2025).  We will do the above testing and go from there.  If referral is needed for neurology we will do so.  Patient was given instructions and counseling regarding her condition or for health maintenance advice. Please see specific information pulled into the AVS if appropriate.     Parts of this note are electronic transcriptions/translations of spoken language to printed text using the Dragon Dictation system.          Audra Tran, APRN  11/14/2024

## 2024-11-14 NOTE — ASSESSMENT & PLAN NOTE
Orders:    iron polysaccharides (Ferrex 150) 150 MG capsule; Take 1 capsule by mouth Daily.    TSH    Iron Profile

## 2024-11-14 NOTE — ASSESSMENT & PLAN NOTE
Orders:    ARIPiprazole (ABILIFY) 5 MG tablet; Take 1.5 tablets by mouth Daily.    DULoxetine (CYMBALTA) 60 MG capsule; Take 1 capsule by mouth Daily.

## 2024-11-14 NOTE — ASSESSMENT & PLAN NOTE
Orders:    levothyroxine (SYNTHROID, LEVOTHROID) 25 MCG tablet; Take 1 tablet by mouth Daily.    T4, Free    T3, Free    Ferritin

## 2024-11-18 RX ORDER — ICOSAPENT ETHYL 1 G/1
2 CAPSULE ORAL 2 TIMES DAILY WITH MEALS
Qty: 60 CAPSULE | Refills: 5 | Status: SHIPPED | OUTPATIENT
Start: 2024-11-18

## 2024-11-20 ENCOUNTER — TRANSCRIBE ORDERS (OUTPATIENT)
Dept: ADMINISTRATIVE | Facility: HOSPITAL | Age: 54
End: 2024-11-20
Payer: COMMERCIAL

## 2024-11-20 DIAGNOSIS — I67.1 CEREBRAL ANEURYSM, NONRUPTURED: Primary | ICD-10-CM

## 2024-12-03 ENCOUNTER — TRANSCRIBE ORDERS (OUTPATIENT)
Dept: ADMINISTRATIVE | Facility: HOSPITAL | Age: 54
End: 2024-12-03
Payer: COMMERCIAL

## 2024-12-03 DIAGNOSIS — D35.2 PITUITARY ADENOMA: Primary | ICD-10-CM

## 2024-12-10 ENCOUNTER — HOSPITAL ENCOUNTER (OUTPATIENT)
Dept: CT IMAGING | Facility: HOSPITAL | Age: 54
Discharge: HOME | End: 2024-12-10
Admitting: NURSE PRACTITIONER
Payer: COMMERCIAL

## 2024-12-10 DIAGNOSIS — I67.1 CEREBRAL ANEURYSM, NONRUPTURED: ICD-10-CM

## 2024-12-10 PROCEDURE — 70496 CT ANGIOGRAPHY HEAD: CPT

## 2024-12-10 PROCEDURE — 25510000001 IOPAMIDOL PER 1 ML: Performed by: NURSE PRACTITIONER

## 2024-12-10 RX ORDER — IOPAMIDOL 755 MG/ML
100 INJECTION, SOLUTION INTRAVASCULAR
Status: COMPLETED | OUTPATIENT
Start: 2024-12-10 | End: 2024-12-10

## 2024-12-10 RX ADMIN — IOPAMIDOL 100 ML: 755 INJECTION, SOLUTION INTRAVENOUS at 13:41

## 2024-12-30 ENCOUNTER — HOSPITAL ENCOUNTER (OUTPATIENT)
Dept: MRI IMAGING | Facility: HOSPITAL | Age: 54
Discharge: HOME | End: 2024-12-30
Payer: COMMERCIAL

## 2024-12-30 DIAGNOSIS — R25.1 SHAKING: ICD-10-CM

## 2024-12-30 DIAGNOSIS — Z86.79 HISTORY OF ANEURYSM: ICD-10-CM

## 2024-12-30 LAB
CREAT BLDA-MCNC: 0.8 MG/DL (ref 0.6–1.3)
EGFRCR SERPLBLD CKD-EPI 2021: 87.1 ML/MIN/1.73

## 2024-12-30 PROCEDURE — A9577 INJ MULTIHANCE: HCPCS | Performed by: NURSE PRACTITIONER

## 2024-12-30 PROCEDURE — 25510000002 GADOBENATE DIMEGLUMINE 529 MG/ML SOLUTION: Performed by: NURSE PRACTITIONER

## 2024-12-30 PROCEDURE — 70544 MR ANGIOGRAPHY HEAD W/O DYE: CPT

## 2024-12-30 PROCEDURE — 70553 MRI BRAIN STEM W/O & W/DYE: CPT

## 2024-12-30 PROCEDURE — 82565 ASSAY OF CREATININE: CPT

## 2024-12-30 RX ADMIN — GADOBENATE DIMEGLUMINE 20 ML: 529 INJECTION, SOLUTION INTRAVENOUS at 12:58

## 2025-01-02 DIAGNOSIS — E11.65 TYPE 2 DIABETES MELLITUS WITH HYPERGLYCEMIA, UNSPECIFIED WHETHER LONG TERM INSULIN USE: ICD-10-CM

## 2025-01-02 DIAGNOSIS — E11.65 UNCONTROLLED TYPE 2 DIABETES MELLITUS WITH HYPERGLYCEMIA: ICD-10-CM

## 2025-01-02 RX ORDER — PEN NEEDLE, DIABETIC 32GX 5/32"
NEEDLE, DISPOSABLE MISCELLANEOUS
Qty: 100 EACH | Refills: 5 | OUTPATIENT
Start: 2025-01-02

## 2025-01-02 NOTE — TELEPHONE ENCOUNTER
Last OV 10/30/2024    Next scheduled appt. 01/07/2025    Called patient states she did not request this refill.  They are different than the ones that Marilee sent in for her.  She has enough to last her until her appointment.   Denied refill will discuss at patient appointment

## 2025-01-07 ENCOUNTER — TELEMEDICINE (OUTPATIENT)
Dept: DIABETES SERVICES | Facility: HOSPITAL | Age: 55
End: 2025-01-07
Payer: COMMERCIAL

## 2025-01-07 DIAGNOSIS — E11.65 TYPE 2 DIABETES MELLITUS WITH HYPERGLYCEMIA, WITH LONG-TERM CURRENT USE OF INSULIN: Primary | ICD-10-CM

## 2025-01-07 DIAGNOSIS — Z97.8 USES SELF-APPLIED CONTINUOUS GLUCOSE MONITORING DEVICE: ICD-10-CM

## 2025-01-07 DIAGNOSIS — R79.89 ABNORMAL CORTISOL LEVEL: ICD-10-CM

## 2025-01-07 DIAGNOSIS — Z79.4 TYPE 2 DIABETES MELLITUS WITH HYPERGLYCEMIA, WITH LONG-TERM CURRENT USE OF INSULIN: Primary | ICD-10-CM

## 2025-01-07 DIAGNOSIS — E11.65 UNCONTROLLED TYPE 2 DIABETES MELLITUS WITH HYPERGLYCEMIA: ICD-10-CM

## 2025-01-07 DIAGNOSIS — E11.65 TYPE 2 DIABETES MELLITUS WITH HYPERGLYCEMIA, UNSPECIFIED WHETHER LONG TERM INSULIN USE: ICD-10-CM

## 2025-01-07 PROCEDURE — 1159F MED LIST DOCD IN RCRD: CPT | Performed by: NURSE PRACTITIONER

## 2025-01-07 PROCEDURE — 1160F RVW MEDS BY RX/DR IN RCRD: CPT | Performed by: NURSE PRACTITIONER

## 2025-01-07 PROCEDURE — 99214 OFFICE O/P EST MOD 30 MIN: CPT | Performed by: NURSE PRACTITIONER

## 2025-01-07 RX ORDER — BLOOD SUGAR DIAGNOSTIC
1 STRIP MISCELLANEOUS
Qty: 450 EACH | Refills: 1 | Status: SHIPPED | OUTPATIENT
Start: 2025-01-07

## 2025-01-07 RX ORDER — ACYCLOVIR 800 MG/1
1 TABLET ORAL
Qty: 2 EACH | Refills: 5 | Status: SHIPPED | OUTPATIENT
Start: 2025-01-07

## 2025-01-07 RX ORDER — INSULIN LISPRO 100 [IU]/ML
18 INJECTION, SOLUTION INTRAVENOUS; SUBCUTANEOUS 3 TIMES DAILY
Qty: 16.2 ML | Refills: 5 | Status: SHIPPED | OUTPATIENT
Start: 2025-01-07 | End: 2025-07-06

## 2025-01-07 RX ORDER — TIRZEPATIDE 15 MG/.5ML
15 INJECTION, SOLUTION SUBCUTANEOUS
Qty: 2 ML | Refills: 5 | Status: SHIPPED | OUTPATIENT
Start: 2025-01-07

## 2025-01-07 RX ORDER — INSULIN GLARGINE 100 [IU]/ML
70 INJECTION, SOLUTION SUBCUTANEOUS 2 TIMES DAILY
Qty: 42 ML | Refills: 5 | Status: SHIPPED | OUTPATIENT
Start: 2025-01-07 | End: 2025-07-06

## 2025-01-07 NOTE — PROGRESS NOTES
Chief Complaint  No chief complaint on file.    Referred By: DONNELL Burgos    Subjective          Mode of visit: Video  Location of patient: Home  You have chosen to receive care through a telehealth visit.  Does the patient consent to use of video/audio connection for medical care today: Yes  The visit includes audio and video interaction.  No technical issues occurred during this visit.    Carlota Robin presents to [facility] for diabetes medication management    History of Present Illness    Visit type:  follow-up  Diabetes type:  Type 2  Current diabetes status/concerns/issues:  Reports her fbs is running around 123mg/dl. States she is tolerating the Jardiance well w/o side effects.   Other health concerns: States she went to [place] for the cysts on her pituitary gland. The endocrinologist believes she may have Cushings. States they are going to do more tests before they do inferior pertrosal sinus sampling  Current Diabetes symptoms:    Polyuria: Yes     Polydipsia: Yes     Polyphagia: No   Blurred vision: Yes     Excessive fatigue: Yes    Known Diabetes complications:  Neuropathy: None; Location: N/A  Renal: None  Eyes: None; Location: N/A; Last Eye Exam:  2022 ; Location: [facility]  Amputation/Wounds: None  GI: Reflux  Cardiovascular: Hypertension and Hyperlipidemia  ED: N/A  Other: None  Hypoglycemia:  None reported at this time  Hypoglycemia Symptoms:  No hypoglycemia at this time  Diabetes treatment:  Mounjaro 15 mg weekly, Lantus 70 units bid, Humalog 18 units tid with meals , Jardiance 10mg qd  Blood glucose device:  Harley CGM  Blood glucose monitoring frequency:  Continuous per CGM  Blood glucose range/average:  The 14-day sensor report shows an average glucose of 178mg/dL, with 55% in target range ( mgdL), 42% in the high range (181-250 mg/dL), 3% in the very high range (>250 mg/dL), 0% in the low range (54-70 mg/dL) and 0% in the very low range  (<54 mg/dL).   Glucose Source: Device Reviewed  Dietary behavior:  Avoids sugary drinks, Number of meals each day - 3; Number of snacks each day - 1-2, she eliminated sugar from her diet and decreased her carb intake.   Activity/Exercise: walking, stretches, dancing to the oldies    Past Medical History:   Diagnosis Date    Acne     Allergic     Allergic rhinitis due to allergen 04/15/2021    Allergies     Anemia 2020    Anesthesia     HARD TO WAKE UP POSTOP    Anxiety 04/15/2021    Asthma     INHALERS PRN    Bundle branch block, right     SEE'S DR BENNETT IN 2019, NOW SEES ON AS NEEDED BASIS, MAINLY FOLLOW PCP ROUTINELY. DENIED CP/SOB    Depression     Diabetes     Diabetes mellitus, type 2 2020    DOESN'T CHECK BG AT HOME    Essential hypertension 2020    Fatigue 04/15/2021    Fibromyalgia     GERD (gastroesophageal reflux disease) 2020    High cholesterol     Hyperlipidemia 2020    Hypothyroidism 2020    Inguinal hernia     Insomnia     Macromastia     Migraine     Obesity 2020    DANY (obstructive sleep apnea) 2020    Palpitation     NO PROBLEMS CURRENTLY. FOLLOWS PCP    Right bundle branch block 2020    SEE'S DR BLUE ON AS NEEDED BASIS, SEE'S PCP ON YEARLY. DENIED CP/SOB    RLS (restless legs syndrome)     Seasonal allergies     Sinus trouble     Stomach pain 2016    Stress 2016    Thyroid disease     Urinary incontinence     Vitamin B 12 deficiency     Vitamin D deficiency 2020     Past Surgical History:   Procedure Laterality Date    BILATERAL BREAST REDUCTION Bilateral 2022    Procedure: BREAST REDUCTION;  Surgeon: Zeeshan Bone MD;  Location: [facility] [place];  Service: Plastics;  Laterality: Bilateral;    BREAST BIOPSY Right      SECTION      CHOLECYSTECTOMY      COLONOSCOPY      CYSTOSCOPY BOTOX INJECTION OF BLADDER  2019    ENDOSCOPY      HERNIA REPAIR      HYSTERECTOMY  2017    WISDOM TOOTH EXTRACTION       Family  History   Problem Relation Age of Onset    Stroke Mother     Diabetes Mother     Restless legs syndrome Mother     Hyperlipidemia Mother     Nephrolithiasis Father     Sleep apnea Father     Depression Father     Restless legs syndrome Sister     No Known Problems Brother     No Known Problems Maternal Aunt     No Known Problems Paternal Aunt     Diabetes Maternal Uncle     No Known Problems Paternal Uncle     No Known Problems Maternal Grandfather     Colon cancer Maternal Grandmother         50S    Colon cancer Paternal Grandfather         70S    No Known Problems Paternal Grandmother     No Known Problems Cousin     Breast cancer Other         20S    Diabetes Other     ADD / ADHD Neg Hx     Alcohol abuse Neg Hx     Anxiety disorder Neg Hx     Bipolar disorder Neg Hx     Dementia Neg Hx     Drug abuse Neg Hx     OCD Neg Hx     Paranoid behavior Neg Hx     Schizophrenia Neg Hx     Seizures Neg Hx     Self-Injurious Behavior  Neg Hx     Suicide Attempts Neg Hx      Social History     Socioeconomic History    Marital status:      Spouse name: ELIZABETH DIAZ    Number of children: 2    Years of education: 12+    Highest education level: Some college, no degree   Tobacco Use    Smoking status: Never     Passive exposure: Past    Smokeless tobacco: Never    Tobacco comments:     SECOND HAND EXPOSURE    Vaping Use    Vaping status: Never Used   Substance and Sexual Activity    Alcohol use: Never    Drug use: Never    Sexual activity: Yes     Partners: Female     Birth control/protection: Hysterectomy     Allergies   Allergen Reactions    Pravastatin Sodium Nausea Only    Hydrocodone-Acetaminophen Hallucinations     OK TO TAKE REGULAR TYLENOL     Lisinopril Cough    Pollen Extract Other (See Comments)     CONGESTION, WATERY EYES, SNEEZING        Current Outpatient Medications:     Continuous Glucose Sensor (FreeStyle Harley 3 Sensor) misc, 1 each by Other route Every 14 (Fourteen) Days., Disp: 2 each, Rfl: 5     empagliflozin (Jardiance) 10 MG tablet tablet, Take 1 tablet by mouth Daily for 180 days., Disp: 30 tablet, Rfl: 5    Insulin Glargine (Lantus SoloStar) 100 UNIT/ML injection pen, Inject 70 Units under the skin into the appropriate area as directed 2 (Two) Times a Day for 180 days., Disp: 42 mL, Rfl: 5    Insulin Lispro, 1 Unit Dial, (HUMALOG) 100 UNIT/ML solution pen-injector, Inject 18 Units under the skin into the appropriate area as directed 3 times a day for 180 days. max daily dose of 40 units, Disp: 16.2 mL, Rfl: 5    Insulin Pen Needle (Pen Needles) 32G X 6 MM misc, Use 1 each 5 (Five) Times a Day., Disp: 450 each, Rfl: 1    albuterol (ProAir RespiClick) 108 (90 Base) MCG/ACT inhaler, Inhale 2 puffs Every 6 (Six) Hours As Needed for Wheezing or Shortness of Air., Disp: 18 g, Rfl: 2    amLODIPine (Norvasc) 5 MG tablet, Take 1 tablet by mouth Daily., Disp: 30 tablet, Rfl: 5    amoxicillin-clavulanate (AUGMENTIN) 875-125 MG per tablet, Take 1 tablet by mouth 2 (Two) Times a Day for 10 days., Disp: 20 tablet, Rfl: 0    ARIPiprazole (ABILIFY) 5 MG tablet, Take 1.5 tablets by mouth Daily., Disp: 45 tablet, Rfl: 5    Blood Glucose Monitoring Suppl (FreeStyle Lite) w/Device kit, , Disp: , Rfl:     buPROPion XL (WELLBUTRIN XL) 150 MG 24 hr tablet, Take 1 tablet by mouth Daily., Disp: , Rfl:     Calcium Carbonate 1500 (600 Ca) MG tablet, Take 1 tablet by mouth Daily., Disp: 30 tablet, Rfl: 5    cetirizine (zyrTEC) 10 MG tablet, Take 1 tablet by mouth Daily., Disp: , Rfl:     cimetidine (TAGAMET) 200 MG tablet, Take 1 tablet by mouth 2 (Two) Times a Day Before Meals., Disp: 60 tablet, Rfl: 5    cloNIDine (CATAPRES) 0.1 MG tablet, Take 1 tablet by mouth Every Night., Disp: 30 tablet, Rfl: 5    cyanocobalamin (VITAMIN B-12) 1000 MCG tablet, Vitamin B-12 1,000 mcg oral tablet take 1 tablet by oral route daily   Active, Disp: , Rfl:     DULoxetine (CYMBALTA) 60 MG capsule, Take 1 capsule by mouth Daily., Disp: 30 capsule,  Rfl: 5    ezetimibe (Zetia) 10 MG tablet, Take 1 tablet by mouth Daily., Disp: 30 tablet, Rfl: 5    FREESTYLE LITE test strip, Use as directed daily, Disp: 100 each, Rfl: 1    gabapentin (NEURONTIN) 100 MG capsule, Take 1 capsule by mouth 2 (Two) Times a Day As Needed (AS NEEDED FOR PAIN). 100MG TWICE A DAY, 400MG AT BEDTIME, Disp: , Rfl:     gabapentin (NEURONTIN) 400 MG capsule, Take 1 capsule by mouth Daily. 400MG AT BEDTIME, 100MG TWICE PER DAY, Disp: , Rfl:     hydroCHLOROthiazide 12.5 MG tablet, Take 1 tablet by mouth Daily., Disp: 30 tablet, Rfl: 5    icosapent ethyl (VASCEPA) 1 g capsule capsule, Take 2 g by mouth 2 (Two) Times a Day With Meals., Disp: 60 capsule, Rfl: 5    iron polysaccharides (Ferrex 150) 150 MG capsule, Take 1 capsule by mouth Daily., Disp: 30 capsule, Rfl: 5    levothyroxine (SYNTHROID, LEVOTHROID) 25 MCG tablet, Take 1 tablet by mouth Daily., Disp: 30 tablet, Rfl: 5    losartan (COZAAR) 25 MG tablet, Take 1 tablet by mouth 2 (Two) Times a Day., Disp: 60 tablet, Rfl: 5    meloxicam (MOBIC) 7.5 MG tablet, Take 1 tablet by mouth Daily., Disp: 30 tablet, Rfl: 5    montelukast (SINGULAIR) 10 MG tablet, Take 1 tablet by mouth Every Evening., Disp: 30 tablet, Rfl: 5    ondansetron (Zofran) 4 MG tablet, Take 1 tablet by mouth Every 8 (Eight) Hours As Needed for Nausea or Vomiting., Disp: 30 tablet, Rfl: 0    pantoprazole (PROTONIX) 40 MG EC tablet, Take 1 tablet by mouth Daily., Disp: 30 tablet, Rfl: 5    promethazine (PHENERGAN) 12.5 MG tablet, Take 1 tablet by mouth Every 6 (Six) Hours As Needed for Nausea or Vomiting., Disp: 20 tablet, Rfl: 0    saccharomyces boulardii (FLORASTOR) 250 MG capsule, Take 1 capsule by mouth 2 (Two) Times a Day., Disp: , Rfl:     solifenacin (VESICARE) 5 MG tablet, Vesicare 5 mg oral tablet take 1 tablet (5 mg) by oral route once daily   Suspended, Disp: , Rfl:     Tirzepatide (Mounjaro) 15 MG/0.5ML solution auto-injector, Inject 15 mg under the skin into the  appropriate area as directed Every 7 (Seven) Days., Disp: 2 mL, Rfl: 5    topiramate (TOPAMAX) 50 MG tablet, , Disp: , Rfl:     traZODone (DESYREL) 50 MG tablet, Take 1 tablet by mouth Every Night., Disp: 30 tablet, Rfl: 5    vitamin D (ERGOCALCIFEROL) 1.25 MG (63439 UT) capsule capsule, Take 1 capsule by mouth 1 (One) Time Per Week., Disp: 5 capsule, Rfl: 5    Vitamin E 90 MG (200 UNIT) capsule, Take 1 capsule by mouth Daily., Disp: 30 capsule, Rfl: 5    Objective     There were no vitals filed for this visit.  There is no height or weight on file to calculate BMI.    Physical Exam  Constitutional:       Appearance: Normal appearance. She is normal weight.   HENT:      Head: Normocephalic and atraumatic.      Right Ear: External ear normal.      Left Ear: External ear normal.      Nose: Nose normal.   Eyes:      Extraocular Movements: Extraocular movements intact.      Conjunctiva/sclera: Conjunctivae normal.   Pulmonary:      Effort: Pulmonary effort is normal.   Musculoskeletal:         General: Normal range of motion.      Cervical back: Normal range of motion.   Skin:     General: Skin is warm and dry.   Neurological:      General: No focal deficit present.      Mental Status: She is alert and oriented to person, place, and time. Mental status is at baseline.   Psychiatric:         Mood and Affect: Mood normal.         Behavior: Behavior normal.         Thought Content: Thought content normal.         Judgment: Judgment normal.         Result Review :   The following data was reviewed by: RAHEEM Garcia on 01/07/2025:    Most Recent A1C          10/30/2024    11:16   HGBA1C Most Recent   Hemoglobin A1C 7.7        A1C Last 3 Results          4/11/2024    10:53 7/25/2024    10:29 10/30/2024    11:16   HGBA1C Last 3 Results   Hemoglobin A1C 9.0  7.8  7.7      A1c collected on 10/30/24 is 7.7%, indicating Uncontrolled Type II diabetes.  This result is down from the prior result of 7.8% collected on  7/25/24.     Creatinine   Date Value Ref Range Status   12/30/2024 0.80 0.60 - 1.30 mg/dL Final     Comment:     Serial Number: [id number]:  [id number]   11/14/2024 0.76 0.57 - 1.00 mg/dL Final     eGFR   Date Value Ref Range Status   12/30/2024 87.1 >60.0 mL/min/1.73 Final   11/14/2024 92.7 >60.0 mL/min/1.73 Final     Labs collected on 12/30/24 show Stage II mild (GFR = 60-89mL/min)    Microalbumin, Urine   Date Value Ref Range Status   10/30/2024 <1.2 mg/dL Final   11/15/2023 <1.2 mg/dL Final     Creatinine, Urine   Date Value Ref Range Status   10/30/2024 41.5 mg/dL Final   03/29/2022 99.0 mg/dL Final     Microalbumin/Creatinine Ratio   Date Value Ref Range Status   10/30/2024   Final     Comment:     Unable to calculate   03/29/2022 44.4 mg/g Final     Urine microalbuminuria collected on 10/30/24 is negative for microalbuminuria    Total Cholesterol   Date Value Ref Range Status   11/14/2024 235 (H) 0 - 200 mg/dL Final   05/15/2024 208 (H) 0 - 200 mg/dL Final     Triglycerides   Date Value Ref Range Status   11/14/2024 348 (H) 0 - 150 mg/dL Final   05/15/2024 346 (H) 0 - 150 mg/dL Final     HDL Cholesterol   Date Value Ref Range Status   11/14/2024 35 (L) 40 - 60 mg/dL Final   05/15/2024 33 (L) 40 - 60 mg/dL Final     LDL Cholesterol    Date Value Ref Range Status   11/14/2024 137 (H) 0 - 100 mg/dL Final   05/15/2024 115 (H) 0 - 100 mg/dL Final     Lipid panel collected on 11/14/24 shows Hyperlipidemia, Hypertriglyceridemia, and low HDL              Assessment: Pt is here today for a 3 month follow up on her diabetes. Reports her fbs is running around 123mg/dl. Last visit Jardiance 10mg once day was prescribed. States she is tolerating the Jardiance well w/o side effects. Reviewed CGM report with pt in the office today. The 14-day sensor report shows an average glucose of 178mg/dL, with 55% in target range ( mgdL), 42% in the high range (181-250 mg/dL), 3% in the very high range (>250 mg/dL), 0% in the low  range (54-70 mg/dL) and 0% in the very low range (<54 mg/dL). Her A1c in the office today is 7.7% which is down from her previous A1c of 7.8% in July. She was referred to [email or web address] endocrinology for a pituitary lesion and abnormal dexamethasone suppression test. Endocrinology ordered a am salivary cortisol measurement. She is still awaiting results of the salivary cortisol measurement. The endocrinologist believes she may have Cushings. States they are going to do more tests before they do inferior pertrosal sinus sampling        Diagnoses and all orders for this visit:    1. Type 2 diabetes mellitus with hyperglycemia, with long-term current use of insulin (Primary)  -     Insulin Glargine (Lantus SoloStar) 100 UNIT/ML injection pen; Inject 70 Units under the skin into the appropriate area as directed 2 (Two) Times a Day for 180 days.  Dispense: 42 mL; Refill: 5  -     Continuous Glucose Sensor (FreeStyle Harley 3 Sensor) misc; 1 each by Other route Every 14 (Fourteen) Days.  Dispense: 2 each; Refill: 5    2. Type 2 diabetes mellitus with hyperglycemia, unspecified whether long term insulin use  -     Tirzepatide (Mounjaro) 15 MG/0.5ML solution auto-injector; Inject 15 mg under the skin into the appropriate area as directed Every 7 (Seven) Days.  Dispense: 2 mL; Refill: 5  -     Insulin Lispro, 1 Unit Dial, (HUMALOG) 100 UNIT/ML solution pen-injector; Inject 18 Units under the skin into the appropriate area as directed 3 times a day for 180 days. max daily dose of 40 units  Dispense: 16.2 mL; Refill: 5  -     empagliflozin (Jardiance) 10 MG tablet tablet; Take 1 tablet by mouth Daily for 180 days.  Dispense: 30 tablet; Refill: 5  -     Insulin Pen Needle (Pen Needles) 32G X 6 MM misc; Use 1 each 5 (Five) Times a Day.  Dispense: 450 each; Refill: 1  -     Hemoglobin A1c; Future    3. Uncontrolled type 2 diabetes mellitus with hyperglycemia  -     Continuous Glucose Sensor (FreeStyle Harley 3 Sensor) misc; 1 each by Other  route Every 14 (Fourteen) Days.  Dispense: 2 each; Refill: 5    4. Uses self-applied continuous glucose monitoring device    5. Abnormal cortisol level        Plan: No changes were made to her plan of care today. Scheduled a 3 month follow up and we will review her CGM and recheck her A1c at that time.     The patient will monitor her blood glucose levels per continuous glucose monitor.  If she develops problematic hyperglycemia or hypoglycemia or adverse drug reactions, she will contact the office for further instructions.          Follow Up     Return in about 3 months (around 4/7/2025) for CGM Follow Up, Medication Mgmt.    Patient was given instructions and counseling regarding her condition or for health maintenance advice. Please see specific information pulled into the AVS if appropriate.     Marilee Laughlin, APRN  01/07/2025      Dictated Utilizing Dragon Dictation.  Please note that portions of this note were completed with a voice recognition program.  Part of this note may be an electronic transcription/translation of spoken language to printed text using the Dragon Dictation System.

## 2025-01-08 ENCOUNTER — PRIOR AUTHORIZATION (OUTPATIENT)
Dept: DIABETES SERVICES | Facility: HOSPITAL | Age: 55
End: 2025-01-08
Payer: COMMERCIAL

## 2025-01-08 DIAGNOSIS — R92.8 ABNORMAL MAMMOGRAM: Primary | ICD-10-CM

## 2025-01-08 DIAGNOSIS — Z12.31 SCREENING MAMMOGRAM FOR BREAST CANCER: Primary | ICD-10-CM

## 2025-01-08 DIAGNOSIS — R92.8 ABNORMAL MAMMOGRAM: ICD-10-CM

## 2025-01-14 ENCOUNTER — TRANSCRIBE ORDERS (OUTPATIENT)
Dept: ADMINISTRATIVE | Facility: HOSPITAL | Age: 55
End: 2025-01-14
Payer: COMMERCIAL

## 2025-01-14 ENCOUNTER — LAB (OUTPATIENT)
Facility: HOSPITAL | Age: 55
End: 2025-01-14
Payer: COMMERCIAL

## 2025-01-14 DIAGNOSIS — D35.2 PITUITARY EPIDERMOID TUMOR: ICD-10-CM

## 2025-01-14 DIAGNOSIS — E11.65 TYPE 2 DIABETES MELLITUS WITH HYPERGLYCEMIA, UNSPECIFIED WHETHER LONG TERM INSULIN USE: ICD-10-CM

## 2025-01-14 DIAGNOSIS — E24.9 CUSHING'S SYNDROME: Primary | ICD-10-CM

## 2025-01-14 LAB — HBA1C MFR BLD: 7.2 % (ref 4.8–5.6)

## 2025-01-14 PROCEDURE — 36415 COLL VENOUS BLD VENIPUNCTURE: CPT

## 2025-01-14 PROCEDURE — 83036 HEMOGLOBIN GLYCOSYLATED A1C: CPT

## 2025-01-18 ENCOUNTER — LAB (OUTPATIENT)
Facility: HOSPITAL | Age: 55
End: 2025-01-18
Payer: COMMERCIAL

## 2025-01-18 DIAGNOSIS — E24.9 CUSHING'S SYNDROME: ICD-10-CM

## 2025-01-18 DIAGNOSIS — D35.2 PITUITARY EPIDERMOID TUMOR: ICD-10-CM

## 2025-01-18 LAB — CORTIS AM PEAK SERPL-MCNC: 1.12 MCG/DL (ref 6.02–18.4)

## 2025-01-18 PROCEDURE — 82530 CORTISOL FREE: CPT

## 2025-01-18 PROCEDURE — 36415 COLL VENOUS BLD VENIPUNCTURE: CPT

## 2025-01-18 PROCEDURE — 81050 URINALYSIS VOLUME MEASURE: CPT

## 2025-01-18 PROCEDURE — 82533 TOTAL CORTISOL: CPT

## 2025-01-23 LAB
CORTIS F 24H UR-MCNC: 4 UG/L
CORTIS F 24H UR-MRATE: 11 UG/24 HR (ref 6–42)

## 2025-01-24 ENCOUNTER — OFFICE VISIT (OUTPATIENT)
Dept: SLEEP MEDICINE | Facility: HOSPITAL | Age: 55
End: 2025-01-24
Payer: COMMERCIAL

## 2025-01-24 VITALS
HEIGHT: 60 IN | HEART RATE: 92 BPM | BODY MASS INDEX: 42.7 KG/M2 | SYSTOLIC BLOOD PRESSURE: 113 MMHG | WEIGHT: 217.5 LBS | DIASTOLIC BLOOD PRESSURE: 61 MMHG | OXYGEN SATURATION: 96 %

## 2025-01-24 DIAGNOSIS — G47.33 OBSTRUCTIVE SLEEP APNEA, ADULT: Primary | ICD-10-CM

## 2025-01-24 DIAGNOSIS — G47.19 EXCESSIVE DAYTIME SLEEPINESS: ICD-10-CM

## 2025-01-24 DIAGNOSIS — G25.81 RESTLESS LEGS SYNDROME (RLS): ICD-10-CM

## 2025-01-24 DIAGNOSIS — Z78.9 INTOLERANCE OF CONTINUOUS POSITIVE AIRWAY PRESSURE (CPAP) VENTILATION: ICD-10-CM

## 2025-01-24 DIAGNOSIS — G47.34 SLEEP RELATED HYPOXIA: ICD-10-CM

## 2025-01-24 DIAGNOSIS — E66.813 CLASS 3 SEVERE OBESITY WITH SERIOUS COMORBIDITY AND BODY MASS INDEX (BMI) OF 40.0 TO 44.9 IN ADULT, UNSPECIFIED OBESITY TYPE: ICD-10-CM

## 2025-01-24 DIAGNOSIS — E66.01 CLASS 3 SEVERE OBESITY WITH SERIOUS COMORBIDITY AND BODY MASS INDEX (BMI) OF 40.0 TO 44.9 IN ADULT, UNSPECIFIED OBESITY TYPE: ICD-10-CM

## 2025-01-24 PROCEDURE — G0463 HOSPITAL OUTPT CLINIC VISIT: HCPCS

## 2025-01-24 RX ORDER — DEXTROAMPHETAMINE SACCHARATE, AMPHETAMINE ASPARTATE, DEXTROAMPHETAMINE SULFATE AND AMPHETAMINE SULFATE 2.5; 2.5; 2.5; 2.5 MG/1; MG/1; MG/1; MG/1
10 TABLET ORAL DAILY
COMMUNITY
Start: 2024-08-12

## 2025-01-24 RX ORDER — LANCETS 28 GAUGE
EACH MISCELLANEOUS
COMMUNITY
Start: 2024-10-14

## 2025-01-24 RX ORDER — AMLODIPINE BESYLATE 10 MG/1
10 TABLET ORAL DAILY
COMMUNITY

## 2025-01-24 NOTE — PROGRESS NOTES
[address] [place], [place] [address]  Phone: [phone]  Fax: [phone]      SLEEP CLINIC FOLLOW UP PROGRESS NOTE.    Carlota Robin  [id number]   1970  54 y.o.  female      PCP: Audra Tran APRN      Date of visit: 1/24/2025    Chief Complaint   Patient presents with    Sleep Apnea    Daytime Sleepiness     CPAP intolerance very high air pressures       Medications and allergies are reviewed by me and documented in the encounter.     SOCIAL (habits pertaining to sleep medicine)  History tobacco use:No  History of alcohol use: 0 per week  Caffeine use: 1 beverages/d    Historians in today's encounter: Patient and Hipolito (Spouse)  Prior to the onset of the encounter I made sure that the patient provided verbal consent to me to discuss all of the patient's medical information to include any sensitive information/topics in front of the above noted individual also in the room. The patient insisted the above individual stay in the room for entire the encounter to completion.     HPI:  This is a 54 y.o.w/ PMHx of HTN, Hypothryoidism, Diabetes, Anxiety, Depression, RLS/Fibromyalgia (on gabapentin by pain management and po iron by PCP) Here for management of Severe Obstructive Sleep Apnea (Ahi 93.7/hr and sleep related hypoxia (Percent oximetry less than 89% was 128.4 minutes, longest consecutive time under 88% was 10.5 minutes - on 8/23/2023).  Patient is using positive airway pressure therapy and the symptoms of sleep apnea have  NOT improved significantly on the therapy. Normally patient goes to bed at 10 PM and wakes up at 7 AM .           Overall patient's Impression of their PAP therapy is: Not well  Air pressures too much  Her P95 is very very high   ON oximetry order in the past never completed she states not sure why   She wound up seeing Dr. Ivy on telemedicine last year he documented not sure why with him either  No plan for sleep related hypoxia addressed with her last year  per patient   She last saw me 2 years ago when I attempted to address sleep related hypoxia with her        Compliance data as reviewed by me with patient room today:  Date range 12/23/2024 - 1/21/2025  Overall use 97%  4-hour naveen 83%  Average days used 6 hours 37 minutes  Device AirSense 11 AutoSet  Settings 8-20 cm H2O  EPR 2 full-time  95th percentile pressure 17.3 cm H2O  Maximum pressure 18.7 cm H2O  95th percentile leak 28.6 LPM  AHI 0.5  DME: [facility]  Mask used: FFM Julien     - Obesity  Body mass index is 42.48 kg/m².  Wt Readings from Last 3 Encounters:   01/24/25 98.7 kg (217 lb 8 oz)   11/14/24 96.9 kg (213 lb 9.6 oz)   10/30/24 99 kg (218 lb 3.2 oz)     -RLS/Fibromyalgia prescribed by pain management - NOT on opiate therapy  Well controlled RLS  I have no concern for uncontrolled sleep related movement disorder on clinical evaluation today  Gabapentin 100 mg BID   Gabapentin on 400 mg  qhs   On po iron by PCP as well      - Last seen in sleep clinic via telemedicine on 1/31/2024 ~1 year ago by Dr. Ivy no changes to therapy recommended 1 year follow-up.  Dr. Ivy documents unclear why patient seeing him rather than me on that date.  - Last seen by me > 1 year ago on 10/27/2023 AHI 2.1, mask FFM, ordered ON oximetry on PAP she did not complete  Screenshot proof below      Save what is noted above in HPI, denies any OTHER past known:  cardiopulmonary conditions/neurologic disorders/neuromuscular disorders  Never needed supplemental O2 at home  Denies any opioid therapy  Denies any non-MRI compatible metal/hardware in head/neck/chest      REVIEW OF SYSTEMS:   Is negative unless otherwise noted in HPI  [place] Sleepiness Scale  10/24 no near miss or MVC secondary to sleepiness    Disclaimer History: The above history is based on this sleep physician's in room encounter with the patient. Pre encounter self administered questionnaires are taken into consideration and discussed with patient for any  "discordance. The above documentation by this sleep physician is the most accurate clinical information determined by in room sleep physician encounter with patient.     PHYSICAL EXAMINATION:  Vitals:    01/24/25 0900   BP: 113/61   Pulse: 92   SpO2: 96%   Weight: 98.7 kg (217 lb 8 oz)   Height: 152.4 cm (60\")    Body mass index is 42.48 kg/m².   CONSTITUTIONAL: Well appearing, in no overt pain or respiratory distress   ENT: Her Mallampati is IV, her Peña Tongue Position is also IV, she has macroglossia with tongue ridging  RESP SYSTEM: Breath sounds are clear bilateral (no rales/no rhonchi/no wheezes),  No overt respiratory distress, speaks in clear sentences without dyspnea, no accessory muscle use  CARDIOVASULAR: RRR, No rub, No gallop, No edema noted  NEURO: Oriented x 3, no gross focal deficits         ASSESSMENT AND PLAN:  Obstructive sleep apnea, adult [G47.33]  Excessive daytime sleepiness  Sleep related hypoxia   Intolerance of continuous positive airway pressure (CPAP) ventilation [Z78.9]      MEDICALLY NECESSARY TITRATION STUDY AutoCPAP home titration is absolutely no longer appropriate I need her in the sleep lab she's gone now two years without seeing me and the sleep related hypoxia not being addressed despite my attempts to address in the past.   I want to make it clear to other [facility] / Sleep Staff this patient can not have telemedicine visits as long as the sleep related hypoxia is being left unaddressed this is not appropriate.    -Positive Airway Pressure Titration ordered to guide management of sleep disordered breathing:     Titration can proceed per protocol  P95 is very high she may be a potentially good candidate for conversion to BiLevel without a back up rate.         Patient's symptoms and examination is consistent with sleep apnea. have talked to the patient about the signs and symptoms of sleep apnea. In addition, I have also discussed pathophysiology of sleep apnea. "  I also discussed the complications of untreated sleep apnea including effects on hypertension, diabetes mellitus and nonrestorative sleep with hypersomnia which can increase risk for motor vehicle accidents.  Untreated sleep apnea is also a risk factor for development of atrial fibrillation, hypertension, insulin resistance and cerebrovascular accident. Counseled no driving or operating heavy machinery while sleepy. Patient was given opportunity to ask questions and all the questions were answered. Continue current PAP for the time being better than nothing - ordered her supplies to keep her covered until I have guidance from titration. No driving or operating heavy machinery while sleepy.  Obesity,  with BMI is Body mass index is 42.48 kg/m².. Counseled weight loss will be beneficial for reduction in severity of sleep apnea, healthy diet/exercise to achieve same, follow up with primary care physician for serial monitoring and to further guide management.  RLS/Fibromyalgia, Well controlled gabapentin (management with pain management) this medication is ideal no risk for augmentation. Continue with pain management.    Follow up after titration . Patient's questions were answered.        EMR Dragon/Transcription disclaimer:   Much of this encounter note is an electronic transcription/translation of spoken language to printed text. The electronic translation of spoken language may permit erroneous, or at times, nonsensical words or phrases to be inadvertently transcribed; Although I have reviewed the note for such errors, some may still exist.       NPI #: [id number]    Alex Tineo, DO  Sleep Medicine  [facility]  01/24/25

## 2025-01-27 DIAGNOSIS — N30.00 ACUTE CYSTITIS WITHOUT HEMATURIA: ICD-10-CM

## 2025-01-27 DIAGNOSIS — N32.81 OAB (OVERACTIVE BLADDER): Primary | ICD-10-CM

## 2025-01-27 RX ORDER — LEVOFLOXACIN 500 MG/1
500 TABLET, FILM COATED ORAL DAILY
Qty: 7 TABLET | Refills: 0 | Status: SHIPPED | OUTPATIENT
Start: 2025-02-18 | End: 2025-02-25

## 2025-01-29 ENCOUNTER — HOSPITAL ENCOUNTER (OUTPATIENT)
Dept: MAMMOGRAPHY | Facility: HOSPITAL | Age: 55
Discharge: HOME | End: 2025-01-29
Payer: COMMERCIAL

## 2025-01-29 ENCOUNTER — HOSPITAL ENCOUNTER (OUTPATIENT)
Dept: ULTRASOUND IMAGING | Facility: HOSPITAL | Age: 55
Discharge: HOME | End: 2025-01-29
Payer: COMMERCIAL

## 2025-01-29 DIAGNOSIS — R92.8 ABNORMAL MAMMOGRAM: ICD-10-CM

## 2025-01-29 DIAGNOSIS — Z12.31 SCREENING MAMMOGRAM FOR BREAST CANCER: ICD-10-CM

## 2025-01-29 PROCEDURE — G0279 TOMOSYNTHESIS, MAMMO: HCPCS

## 2025-01-29 PROCEDURE — 77066 DX MAMMO INCL CAD BI: CPT

## 2025-02-04 ENCOUNTER — LAB (OUTPATIENT)
Dept: LAB | Facility: HOSPITAL | Age: 55
End: 2025-02-04
Payer: COMMERCIAL

## 2025-02-04 DIAGNOSIS — N30.00 ACUTE CYSTITIS WITHOUT HEMATURIA: ICD-10-CM

## 2025-02-04 PROCEDURE — 87086 URINE CULTURE/COLONY COUNT: CPT

## 2025-02-05 LAB — BACTERIA SPEC AEROBE CULT: NORMAL

## 2025-02-14 ENCOUNTER — PROCEDURE VISIT (OUTPATIENT)
Dept: UROLOGY | Age: 55
End: 2025-02-14
Payer: COMMERCIAL

## 2025-02-14 VITALS — HEIGHT: 60 IN | BODY MASS INDEX: 42.6 KG/M2 | WEIGHT: 217 LBS

## 2025-02-14 DIAGNOSIS — N32.81 OAB (OVERACTIVE BLADDER): Primary | ICD-10-CM

## 2025-02-14 LAB
BILIRUB BLD-MCNC: NEGATIVE MG/DL
CLARITY, POC: CLEAR
COLOR UR: YELLOW
EXPIRATION DATE: ABNORMAL
GLUCOSE UR STRIP-MCNC: NEGATIVE MG/DL
KETONES UR QL: NEGATIVE
LEUKOCYTE EST, POC: ABNORMAL
Lab: ABNORMAL
NITRITE UR-MCNC: NEGATIVE MG/ML
PH UR: 6 [PH] (ref 5–8)
PROT UR STRIP-MCNC: NEGATIVE MG/DL
RBC # UR STRIP: NEGATIVE /UL
SP GR UR: 1.01 (ref 1–1.03)
UROBILINOGEN UR QL: ABNORMAL

## 2025-02-14 NOTE — PROGRESS NOTES
Bladder Botox    Date/Time: 2/14/2025 3:12 PM    Performed by: Dyan Yuan MD  Authorized by: Dyan Yuan MD  Preparation: Patient was prepped and draped in the usual sterile fashion.  Local anesthesia used: no    Anesthesia:  Local anesthesia used: no    Sedation:  Patient sedated: no    Patient tolerance: patient tolerated the procedure well with no immediate complications        Dx:  OAB    The flexible cystoscope was inserted. Bladder was inspected and no abnormalities seen. 20 injection sites on the posterior bladder wall were injected with 0.5 cc of botox for a total of 100 units. No complications. The cystoscope was removed. The patient tolerated the procedure well with no bleeding.

## 2025-02-24 ENCOUNTER — HOSPITAL ENCOUNTER (OUTPATIENT)
Dept: MRI IMAGING | Facility: HOSPITAL | Age: 55
Discharge: HOME | End: 2025-02-24
Admitting: NURSE PRACTITIONER
Payer: COMMERCIAL

## 2025-02-24 DIAGNOSIS — D35.2 PITUITARY ADENOMA: ICD-10-CM

## 2025-02-24 LAB
CREAT BLDA-MCNC: 0.7 MG/DL (ref 0.6–1.3)
EGFRCR SERPLBLD CKD-EPI 2021: 102.3 ML/MIN/1.73

## 2025-02-24 PROCEDURE — 82565 ASSAY OF CREATININE: CPT

## 2025-02-24 PROCEDURE — 70553 MRI BRAIN STEM W/O & W/DYE: CPT

## 2025-02-24 PROCEDURE — 25510000002 GADOBENATE DIMEGLUMINE 529 MG/ML SOLUTION: Performed by: NURSE PRACTITIONER

## 2025-02-24 PROCEDURE — A9577 INJ MULTIHANCE: HCPCS | Performed by: NURSE PRACTITIONER

## 2025-02-24 RX ADMIN — GADOBENATE DIMEGLUMINE 20 ML: 529 INJECTION, SOLUTION INTRAVENOUS at 09:40

## 2025-04-14 NOTE — PROGRESS NOTES
Chief Complaint  Diabetes (Follow up, med mgt, CGM eval, A1C eval)    Referred By: MADINA Burgos*    Patient or patient representative verbalized consent for the use of Ambient Listening during the visit with  RAHEEM Garcia for chart documentation. 4/15/2025  08:26 EDT    Subjective          Carlota Robin presents to [facility] for diabetes medication management    History of Present Illness    History of Present Illness  The patient presents for a follow-up on her diabetes management.    She reports a perceived deterioration in her condition since the last visit, noting an increase in her blood glucose levels. Morning readings have been around 179. Mild thirst and persistent fatigue are experienced, but no significant visual disturbances are reported. The Mounjaro medication does not appear to be effectively suppressing her appetite. She attributes her elevated blood glucose levels to increased stress. Current medications include Humalog 18 units before each meal and Lantus 70 units twice daily. Refills for pen needles and sensors are needed. No foot-related issues are reported, except for the need for regular care.    The patient has been informed that her tumors have completely resolved, with no trace found on recent scans. A re-evaluation is scheduled in 2 months. She has been taking Pituitary herbal supplements as recommended by her herbalist.         Visit type:  follow-up  Diabetes type:  Type 2  Current diabetes status/concerns/issues: Reports she feels she has been more stressed lately due to the pituitary cyst.  States she had a recent MRI and the pituitary cyst has completely resolved.  States she has started taking some pituitary and CT vitamins and she is unsure if that headache may do with it but the cyst is nonexistent.  Other health concerns: No new health concerns  Current Diabetes symptoms:    Polyuria: No   Polydipsia: Yes     Polyphagia:  Yes     Blurred vision: No   Excessive fatigue: Yes    Known Diabetes complications:  Neuropathy: None; Location: N/A  Renal: None  Eyes: None; Location: N/A; Last Eye Exam:  2022 ; Location: [facility]  Amputation/Wounds: None  GI: Reflux  Cardiovascular: Hypertension and Hyperlipidemia  ED: N/A  Other: None  Hypoglycemia:  None reported at this time and 0% per CGM  Hypoglycemia Symptoms:  No hypoglycemia at this time  Current diabetes treatment:   Mounjaro 15 mg weekly, Lantus 70 units bid, Humalog 18 units tid with meals , Jardiance 10mg qd   Blood glucose device:  [employer] CGM  Blood glucose monitoring frequency:  Continuous per CGM  Blood glucose range/average:  The 14-day sensor report shows an average glucose of 199 mg/dL, with 35% in target range ( mgdL), 52% in the high range (181-250 mg/dL), 13% in the very high range (>250 mg/dL), 0% in the low range (54-70 mg/dL) and 0% in the very low range (<54 mg/dL).   Glucose Source: Device Reviewed  Dietary behavior:  Avoids sugary drinks, Number of meals each day - 3; Number of snacks each day - 1-2, she eliminated sugar from her diet and decreased her carb intake.   Activity/Exercise: walking, stretches, dancing to the oldies    Past Medical History:   Diagnosis Date    Acne     Allergic     Allergic rhinitis due to allergen 04/15/2021    Allergies     Anemia 12/03/2020    Anesthesia     HARD TO WAKE UP POSTOP    Anxiety 04/15/2021    Asthma     INHALERS PRN    Bundle branch block, right     SEE'S DR BENNETT IN 2019, NOW SEES ON AS NEEDED BASIS, MAINLY FOLLOW PCP ROUTINELY. DENIED CP/SOB    Depression     Diabetes     Diabetes mellitus, type 2 06/08/2020    DOESN'T CHECK BG AT HOME    Essential hypertension 06/08/2020    Fatigue 04/15/2021    Fibromyalgia     GERD (gastroesophageal reflux disease) 12/03/2020    High cholesterol     Hyperlipidemia 12/03/2020    Hypothyroidism 06/16/2020    Inguinal hernia     Insomnia     Macromastia     Migraine     Obesity  2020    DANY (obstructive sleep apnea) 2020    Palpitation     NO PROBLEMS CURRENTLY. FOLLOWS PCP    Right bundle branch block 2020    SEE'S DR BLUE ON AS NEEDED BASIS, SEE'S PCP ON YEARLY. DENIED CP/SOB    RLS (restless legs syndrome)     Seasonal allergies     Sinus trouble     Stomach pain 2016    Stress 2016    Thyroid disease     Urinary incontinence     Vitamin B 12 deficiency     Vitamin D deficiency 2020     Past Surgical History:   Procedure Laterality Date    BILATERAL BREAST REDUCTION Bilateral 2022    Procedure: BREAST REDUCTION;  Surgeon: Zeeshan Bone MD;  Location: [facility] [place];  Service: Plastics;  Laterality: Bilateral;    BREAST BIOPSY Right      SECTION      CHOLECYSTECTOMY      COLONOSCOPY      CYSTOSCOPY BOTOX INJECTION OF BLADDER  2019    ENDOSCOPY      HERNIA REPAIR      HYSTERECTOMY  2017    WISDOM TOOTH EXTRACTION       Family History   Problem Relation Age of Onset    Stroke Mother     Diabetes Mother     Restless legs syndrome Mother     Hyperlipidemia Mother     Nephrolithiasis Father     Sleep apnea Father     Depression Father     Restless legs syndrome Sister     No Known Problems Brother     No Known Problems Maternal Aunt     No Known Problems Paternal Aunt     Diabetes Maternal Uncle     No Known Problems Paternal Uncle     No Known Problems Maternal Grandfather     Colon cancer Maternal Grandmother         50S    Colon cancer Paternal Grandfather         70S    No Known Problems Paternal Grandmother     No Known Problems Cousin     Breast cancer Other         20S    Diabetes Other     ADD / ADHD Neg Hx     Alcohol abuse Neg Hx     Anxiety disorder Neg Hx     Bipolar disorder Neg Hx     Dementia Neg Hx     Drug abuse Neg Hx     OCD Neg Hx     Paranoid behavior Neg Hx     Schizophrenia Neg Hx     Seizures Neg Hx     Self-Injurious Behavior  Neg Hx     Suicide Attempts Neg Hx      Social History     Socioeconomic History     Marital status:      Spouse name: ELIZABETH DIAZ    Number of children: 2    Years of education: 12+    Highest education level: Some college, no degree   Tobacco Use    Smoking status: Never     Passive exposure: Past    Smokeless tobacco: Never    Tobacco comments:     SECOND HAND EXPOSURE    Vaping Use    Vaping status: Never Used   Substance and Sexual Activity    Alcohol use: Never    Drug use: Never    Sexual activity: Yes     Partners: Female     Birth control/protection: Hysterectomy     Allergies   Allergen Reactions    Pravastatin Sodium Nausea Only    Hydrocodone-Acetaminophen Hallucinations     OK TO TAKE REGULAR TYLENOL     Lisinopril Cough    Pollen Extract Other (See Comments)     CONGESTION, WATERY EYES, SNEEZING        Current Outpatient Medications:     albuterol (ProAir RespiClick) 108 (90 Base) MCG/ACT inhaler, Inhale 2 puffs Every 6 (Six) Hours As Needed for Wheezing or Shortness of Air., Disp: 18 g, Rfl: 2    amLODIPine (NORVASC) 10 MG tablet, Take 1 tablet by mouth Daily., Disp: , Rfl:     amphetamine-dextroamphetamine (ADDERALL) 10 MG tablet, Take 1 tablet by mouth Daily., Disp: , Rfl:     ARIPiprazole (ABILIFY) 5 MG tablet, Take 1.5 tablets by mouth Daily., Disp: 45 tablet, Rfl: 5    Blood Glucose Monitoring Suppl (FreeStyle Lite) w/Device kit, , Disp: , Rfl:     buPROPion XL (WELLBUTRIN XL) 150 MG 24 hr tablet, Take 1 tablet by mouth Daily., Disp: , Rfl:     Calcium Carbonate 1500 (600 Ca) MG tablet, Take 1 tablet by mouth Daily., Disp: 30 tablet, Rfl: 5    cetirizine (zyrTEC) 10 MG tablet, Take 1 tablet by mouth Daily., Disp: , Rfl:     cimetidine (TAGAMET) 200 MG tablet, Take 1 tablet by mouth 2 (Two) Times a Day Before Meals., Disp: 60 tablet, Rfl: 5    cloNIDine (CATAPRES) 0.1 MG tablet, Take 1 tablet by mouth Every Night., Disp: 30 tablet, Rfl: 5    Continuous Glucose Sensor (FreeStyle Harley 3 Sensor) misc, 1 each by Other route Every 14 (Fourteen) Days., Disp: 2 each, Rfl: 5     cyanocobalamin (VITAMIN B-12) 1000 MCG tablet, Vitamin B-12 1,000 mcg oral tablet take 1 tablet by oral route daily   Active, Disp: , Rfl:     DULoxetine (CYMBALTA) 60 MG capsule, Take 1 capsule by mouth Daily., Disp: 30 capsule, Rfl: 5    empagliflozin (Jardiance) 10 MG tablet tablet, Take 1 tablet by mouth Daily for 180 days., Disp: 90 tablet, Rfl: 1    ezetimibe (Zetia) 10 MG tablet, Take 1 tablet by mouth Daily., Disp: 30 tablet, Rfl: 5    FREESTYLE LITE test strip, Use as directed daily, Disp: 100 each, Rfl: 1    hydroCHLOROthiazide 12.5 MG tablet, Take 1 tablet by mouth Daily., Disp: 30 tablet, Rfl: 5    icosapent ethyl (VASCEPA) 1 g capsule capsule, Take 2 g by mouth 2 (Two) Times a Day With Meals., Disp: 60 capsule, Rfl: 5    Insulin Glargine (Lantus SoloStar) 100 UNIT/ML injection pen, Inject 72 Units under the skin into the appropriate area as directed 2 (Two) Times a Day for 90 days., Disp: 130 mL, Rfl: 1    Insulin Lispro, 1 Unit Dial, (HUMALOG) 100 UNIT/ML solution pen-injector, Inject 20 Units under the skin into the appropriate area as directed 3 times a day for 90 days. max daily dose of 60 units, Disp: 54 mL, Rfl: 1    Insulin Pen Needle (Pen Needles) 32G X 6 MM misc, Use 1 each 5 (Five) Times a Day., Disp: 450 each, Rfl: 1    iron polysaccharides (Ferrex 150) 150 MG capsule, Take 1 capsule by mouth Daily., Disp: 30 capsule, Rfl: 5    Lancets (freestyle) lancets, , Disp: , Rfl:     levothyroxine (SYNTHROID, LEVOTHROID) 25 MCG tablet, Take 1 tablet by mouth Daily., Disp: 30 tablet, Rfl: 5    losartan (COZAAR) 25 MG tablet, Take 1 tablet by mouth 2 (Two) Times a Day., Disp: 60 tablet, Rfl: 5    meloxicam (MOBIC) 7.5 MG tablet, Take 1 tablet by mouth Daily., Disp: 30 tablet, Rfl: 5    montelukast (SINGULAIR) 10 MG tablet, Take 1 tablet by mouth Every Evening., Disp: 30 tablet, Rfl: 5    ondansetron (Zofran) 4 MG tablet, Take 1 tablet by mouth Every 8 (Eight) Hours As Needed for Nausea or Vomiting.,  "Disp: 30 tablet, Rfl: 0    pantoprazole (PROTONIX) 40 MG EC tablet, Take 1 tablet by mouth Daily., Disp: 30 tablet, Rfl: 5    promethazine (PHENERGAN) 12.5 MG tablet, Take 1 tablet by mouth Every 6 (Six) Hours As Needed for Nausea or Vomiting., Disp: 20 tablet, Rfl: 0    saccharomyces boulardii (FLORASTOR) 250 MG capsule, Take 1 capsule by mouth 2 (Two) Times a Day., Disp: , Rfl:     solifenacin (VESICARE) 5 MG tablet, Vesicare 5 mg oral tablet take 1 tablet (5 mg) by oral route once daily   Suspended, Disp: , Rfl:     Tirzepatide (Mounjaro) 15 MG/0.5ML solution auto-injector, Inject 15 mg under the skin into the appropriate area as directed Every 7 (Seven) Days., Disp: 2 mL, Rfl: 5    topiramate (TOPAMAX) 50 MG tablet, , Disp: , Rfl:     traZODone (DESYREL) 50 MG tablet, Take 1 tablet by mouth Every Night., Disp: 30 tablet, Rfl: 5    vitamin D (ERGOCALCIFEROL) 1.25 MG (15826 UT) capsule capsule, Take 1 capsule by mouth 1 (One) Time Per Week., Disp: 5 capsule, Rfl: 5    Vitamin E 90 MG (200 UNIT) capsule, Take 1 capsule by mouth Daily., Disp: 30 capsule, Rfl: 5    Objective     Vitals:    04/15/25 0812   BP: 123/79   BP Location: Left arm   Patient Position: Sitting   Cuff Size: Adult   Pulse: 91   SpO2: 97%   Weight: 99.9 kg (220 lb 4.8 oz)   Height: 152.4 cm (60\")     Body mass index is 43.02 kg/m².    Physical Exam  Constitutional:       Appearance: Normal appearance. She is obese.      Comments: Severe Obesity (BMI >= 40) Pt Current BMI = 43.02     HENT:      Head: Normocephalic and atraumatic.      Right Ear: External ear normal.      Left Ear: External ear normal.      Nose: Nose normal.   Eyes:      Extraocular Movements: Extraocular movements intact.      Conjunctiva/sclera: Conjunctivae normal.   Pulmonary:      Effort: Pulmonary effort is normal.   Musculoskeletal:         General: Normal range of motion.      Cervical back: Normal range of motion.   Skin:     General: Skin is warm and dry.   Neurological: "      General: No focal deficit present.      Mental Status: She is alert and oriented to person, place, and time. Mental status is at baseline.   Psychiatric:         Mood and Affect: Mood normal.         Behavior: Behavior normal.         Thought Content: Thought content normal.         Judgment: Judgment normal.         Diabetic Foot Exam Performed and Monofilament Test Performed  Diabetic foot exam:   Left: Filament test present   Pulses Dorsalis Pedis:  present  Posterior Tibial:  present   Vibratory sensation normal   Proprioception normal   Sharp/dull discrimination normal       Right: Filament test present   Pulses Dorsalis Pedis:  present  Posterior Tibial:  present   Vibratory sensation normal   Proprioception normal   Sharp/dull discrimination normal     Result Review :   The following data was reviewed by: RAHEEM Garcia on 04/15/2025:    Most Recent A1C          4/15/2025    08:25   HGBA1C Most Recent   Hemoglobin A1C 7.6        A1C Last 3 Results          10/30/2024    11:16 1/14/2025    12:03 4/15/2025    08:25   HGBA1C Last 3 Results   Hemoglobin A1C 7.7  7.20  7.6      A1c collected in the office today is 7.6%, indicating Uncontrolled Type II diabetes.  This result is up from the prior result of 7.2% collected on 1/14/25.     Glucose   Date Value Ref Range Status   04/15/2025 196 (H) 70 - 99 mg/dL Final     Comment:     Serial Number: [id number]:  [id number]     Point of care glucose in the office today is  elevated at 196mg/dl.    Creatinine   Date Value Ref Range Status   02/24/2025 0.70 0.60 - 1.30 mg/dL Final     Comment:     Serial Number: [id number]:  [id number]   12/30/2024 0.80 0.60 - 1.30 mg/dL Final     Comment:     Serial Number: [id number]:  [id number]     eGFR   Date Value Ref Range Status   02/24/2025 102.3 >60.0 mL/min/1.73 Final   12/30/2024 87.1 >60.0 mL/min/1.73 Final     Labs collected on 2/24/2025 show Normal values    Microalbumin, Urine   Date Value Ref Range  Status   10/30/2024 <1.2 mg/dL Final   11/15/2023 <1.2 mg/dL Final     Creatinine, Urine   Date Value Ref Range Status   10/30/2024 41.5 mg/dL Final   03/29/2022 99.0 mg/dL Final     Microalbumin/Creatinine Ratio   Date Value Ref Range Status   10/30/2024   Final     Comment:     Unable to calculate   03/29/2022 44.4 mg/g Final     Urine microalbuminuria collected on 10/30/2024 is negative for microalbuminuria    Total Cholesterol   Date Value Ref Range Status   11/14/2024 235 (H) 0 - 200 mg/dL Final   05/15/2024 208 (H) 0 - 200 mg/dL Final     Triglycerides   Date Value Ref Range Status   11/14/2024 348 (H) 0 - 150 mg/dL Final   05/15/2024 346 (H) 0 - 150 mg/dL Final     HDL Cholesterol   Date Value Ref Range Status   11/14/2024 35 (L) 40 - 60 mg/dL Final   05/15/2024 33 (L) 40 - 60 mg/dL Final     LDL Cholesterol    Date Value Ref Range Status   11/14/2024 137 (H) 0 - 100 mg/dL Final   05/15/2024 115 (H) 0 - 100 mg/dL Final     Lipid panel collected on 11/14/2024 shows Hyperlipidemia, Hypertriglyceridemia, and low HDL              Diagnoses and all orders for this visit:    1. Type 2 diabetes mellitus with hyperglycemia, with long-term current use of insulin (Primary)  -     POC Glycosylated Hemoglobin (Hb A1C)  -     Insulin Glargine (Lantus SoloStar) 100 UNIT/ML injection pen; Inject 72 Units under the skin into the appropriate area as directed 2 (Two) Times a Day for 90 days.  Dispense: 130 mL; Refill: 1  -     Continuous Glucose Sensor (FreeStyle Harley 3 Sensor) misc; 1 each by Other route Every 14 (Fourteen) Days.  Dispense: 2 each; Refill: 5    2. Type 2 diabetes mellitus with hyperglycemia, unspecified whether long term insulin use  -     Insulin Lispro, 1 Unit Dial, (HUMALOG) 100 UNIT/ML solution pen-injector; Inject 20 Units under the skin into the appropriate area as directed 3 times a day for 90 days. max daily dose of 60 units  Dispense: 54 mL; Refill: 1  -     Insulin Pen Needle (Pen Needles) 32G X 6  MM misc; Use 1 each 5 (Five) Times a Day.  Dispense: 450 each; Refill: 1  -     empagliflozin (Jardiance) 10 MG tablet tablet; Take 1 tablet by mouth Daily for 180 days.  Dispense: 90 tablet; Refill: 1    3. Uncontrolled type 2 diabetes mellitus with hyperglycemia  -     Continuous Glucose Sensor (FreeStyle Harley 3 Sensor) misc; 1 each by Other route Every 14 (Fourteen) Days.  Dispense: 2 each; Refill: 5    4. Uses self-applied continuous glucose monitoring device    5. Severe obesity (BMI >= 40)    Other orders  -     POC Glucose          Assessment & Plan  1. Diabetes Mellitus.  - Average blood glucose level is 199, slightly elevated. Within target range 35% of the time, with a goal of >70%. Hyperglycemia accounts for 52% of readings, very high readings constitute 13%.  - A1c level has increased from 7.2 to 7.6. No hypoglycemic episodes or very low readings reported.  - Dosage of Lantus will be increased from 70 units twice daily to 72 units twice daily, and Humalog will be increased from 18 units three times daily with meals to 20 units three times daily with meals. Refills for all medications, including pen needles and sensors, will be provided.  - Jardiance will be continued at the current dosage of 10 mg, with a potential increase to 25 mg if necessary. Foot examination conducted today yielded normal results. If blood glucose levels remain elevated, an increase in Jardiance may be considered.      The patient will monitor her blood glucose levels per continuous glucose monitor.  If she develops problematic hyperglycemia or hypoglycemia or adverse drug reactions, she will contact the office for further instructions.        Follow Up     Return in about 3 months (around 7/15/2025) for Medication Mgmt, CGM Follow Up.    Patient was given instructions and counseling regarding her condition or for health maintenance advice. Please see specific information pulled into the AVS if appropriate.     Marilee Zaldivar  RAHEEM Laughlin  04/15/2025      Dictated Utilizing Dragon Dictation.  Please note that portions of this note were completed with a voice recognition program.  Part of this note may be an electronic transcription/translation of spoken language to printed text using the Dragon Dictation System.

## 2025-04-15 ENCOUNTER — OFFICE VISIT (OUTPATIENT)
Dept: DIABETES SERVICES | Facility: HOSPITAL | Age: 55
End: 2025-04-15
Payer: COMMERCIAL

## 2025-04-15 VITALS
HEIGHT: 60 IN | DIASTOLIC BLOOD PRESSURE: 79 MMHG | SYSTOLIC BLOOD PRESSURE: 123 MMHG | BODY MASS INDEX: 43.25 KG/M2 | OXYGEN SATURATION: 97 % | HEART RATE: 91 BPM | WEIGHT: 220.3 LBS

## 2025-04-15 DIAGNOSIS — Z97.8 USES SELF-APPLIED CONTINUOUS GLUCOSE MONITORING DEVICE: ICD-10-CM

## 2025-04-15 DIAGNOSIS — E11.65 TYPE 2 DIABETES MELLITUS WITH HYPERGLYCEMIA, WITH LONG-TERM CURRENT USE OF INSULIN: Primary | ICD-10-CM

## 2025-04-15 DIAGNOSIS — E11.65 TYPE 2 DIABETES MELLITUS WITH HYPERGLYCEMIA, UNSPECIFIED WHETHER LONG TERM INSULIN USE: ICD-10-CM

## 2025-04-15 DIAGNOSIS — E66.01 SEVERE OBESITY (BMI >= 40): ICD-10-CM

## 2025-04-15 DIAGNOSIS — Z79.4 TYPE 2 DIABETES MELLITUS WITH HYPERGLYCEMIA, WITH LONG-TERM CURRENT USE OF INSULIN: Primary | ICD-10-CM

## 2025-04-15 DIAGNOSIS — E11.65 UNCONTROLLED TYPE 2 DIABETES MELLITUS WITH HYPERGLYCEMIA: ICD-10-CM

## 2025-04-15 LAB
EXPIRATION DATE: ABNORMAL
GLUCOSE BLDC GLUCOMTR-MCNC: 196 MG/DL (ref 70–99)
HBA1C MFR BLD: 7.6 % (ref 4.5–5.7)
Lab: ABNORMAL

## 2025-04-15 PROCEDURE — 3078F DIAST BP <80 MM HG: CPT | Performed by: NURSE PRACTITIONER

## 2025-04-15 PROCEDURE — 1160F RVW MEDS BY RX/DR IN RCRD: CPT | Performed by: NURSE PRACTITIONER

## 2025-04-15 PROCEDURE — 3051F HG A1C>EQUAL 7.0%<8.0%: CPT | Performed by: NURSE PRACTITIONER

## 2025-04-15 PROCEDURE — 83036 HEMOGLOBIN GLYCOSYLATED A1C: CPT | Performed by: NURSE PRACTITIONER

## 2025-04-15 PROCEDURE — 1159F MED LIST DOCD IN RCRD: CPT | Performed by: NURSE PRACTITIONER

## 2025-04-15 PROCEDURE — 99214 OFFICE O/P EST MOD 30 MIN: CPT | Performed by: NURSE PRACTITIONER

## 2025-04-15 PROCEDURE — 95251 CONT GLUC MNTR ANALYSIS I&R: CPT | Performed by: NURSE PRACTITIONER

## 2025-04-15 PROCEDURE — 3074F SYST BP LT 130 MM HG: CPT | Performed by: NURSE PRACTITIONER

## 2025-04-15 PROCEDURE — 82948 REAGENT STRIP/BLOOD GLUCOSE: CPT | Performed by: NURSE PRACTITIONER

## 2025-04-15 PROCEDURE — G0463 HOSPITAL OUTPT CLINIC VISIT: HCPCS | Performed by: NURSE PRACTITIONER

## 2025-04-15 RX ORDER — INSULIN LISPRO 100 [IU]/ML
20 INJECTION, SOLUTION INTRAVENOUS; SUBCUTANEOUS 3 TIMES DAILY
Qty: 54 ML | Refills: 1 | Status: SHIPPED | OUTPATIENT
Start: 2025-04-15 | End: 2025-07-14

## 2025-04-15 RX ORDER — ACYCLOVIR 800 MG/1
1 TABLET ORAL
Qty: 2 EACH | Refills: 5 | Status: SHIPPED | OUTPATIENT
Start: 2025-04-15

## 2025-04-15 RX ORDER — BLOOD SUGAR DIAGNOSTIC
1 STRIP MISCELLANEOUS
Qty: 450 EACH | Refills: 1 | Status: SHIPPED | OUTPATIENT
Start: 2025-04-15

## 2025-04-15 RX ORDER — INSULIN GLARGINE 100 [IU]/ML
72 INJECTION, SOLUTION SUBCUTANEOUS 2 TIMES DAILY
Qty: 130 ML | Refills: 1 | Status: SHIPPED | OUTPATIENT
Start: 2025-04-15 | End: 2025-07-14

## 2025-04-15 NOTE — PATIENT INSTRUCTIONS
Increase Lantus from 70 units twice daily to 72 units twice daily    Increase Humalog from 18 units 3 times daily with meals to 20 units 3 times daily with meals

## 2025-04-17 ENCOUNTER — HOSPITAL ENCOUNTER (OUTPATIENT)
Dept: GENERAL RADIOLOGY | Facility: HOSPITAL | Age: 55
Discharge: HOME | End: 2025-04-17
Payer: COMMERCIAL

## 2025-04-17 ENCOUNTER — OFFICE VISIT (OUTPATIENT)
Dept: FAMILY MEDICINE CLINIC | Facility: CLINIC | Age: 55
End: 2025-04-17
Payer: COMMERCIAL

## 2025-04-17 VITALS
RESPIRATION RATE: 18 BRPM | BODY MASS INDEX: 43.15 KG/M2 | HEART RATE: 116 BPM | SYSTOLIC BLOOD PRESSURE: 130 MMHG | HEIGHT: 60 IN | TEMPERATURE: 97.8 F | OXYGEN SATURATION: 97 % | WEIGHT: 219.8 LBS | DIASTOLIC BLOOD PRESSURE: 80 MMHG

## 2025-04-17 DIAGNOSIS — M25.561 ACUTE PAIN OF RIGHT KNEE: ICD-10-CM

## 2025-04-17 DIAGNOSIS — M25.561 ACUTE PAIN OF RIGHT KNEE: Primary | ICD-10-CM

## 2025-04-17 PROCEDURE — 1125F AMNT PAIN NOTED PAIN PRSNT: CPT | Performed by: NURSE PRACTITIONER

## 2025-04-17 PROCEDURE — 1159F MED LIST DOCD IN RCRD: CPT | Performed by: NURSE PRACTITIONER

## 2025-04-17 PROCEDURE — 99213 OFFICE O/P EST LOW 20 MIN: CPT | Performed by: NURSE PRACTITIONER

## 2025-04-17 PROCEDURE — 73590 X-RAY EXAM OF LOWER LEG: CPT

## 2025-04-17 PROCEDURE — 73562 X-RAY EXAM OF KNEE 3: CPT

## 2025-04-17 PROCEDURE — 3075F SYST BP GE 130 - 139MM HG: CPT | Performed by: NURSE PRACTITIONER

## 2025-04-17 PROCEDURE — 3079F DIAST BP 80-89 MM HG: CPT | Performed by: NURSE PRACTITIONER

## 2025-04-17 PROCEDURE — 3051F HG A1C>EQUAL 7.0%<8.0%: CPT | Performed by: NURSE PRACTITIONER

## 2025-04-17 PROCEDURE — 1160F RVW MEDS BY RX/DR IN RCRD: CPT | Performed by: NURSE PRACTITIONER

## 2025-04-17 NOTE — PROGRESS NOTES
Chief Complaint  Knee Pain (Right knee pain)    Subjective          Carlota Robin presents to [facility]  Knee Pain   Pertinent negatives include no numbness.       History of Present Illness  The patient presents for evaluation of right knee pain.    The chief complaint is right knee pain that began approximately 3 days ago. The pain started suddenly while ascending stairs when the knee buckled backwards. The pain is localized to the inside of the kneecap, underneath it, and in the back, extending to the mid-calf area. Movement exacerbates the pain. No tingling sensations in the foot or leg are reported. No self-treatment with ice, heat, or ibuprofen has been attempted; instead, the discomfort has been managed by elevating the foot and limiting its use. The patient is currently taking meloxicam.      Patient or patient representative verbalized consent for the use of Ambient Listening during the visit with  RAHEEM Burgos for chart documentation. 4/17/2025  13:24 EDT      Depression: Not at risk (4/17/2025)    PHQ-2     PHQ-2 Score: 0            Allergies  Pravastatin sodium, Hydrocodone-acetaminophen, Lisinopril, and Pollen extract    Social History     Tobacco Use    Smoking status: Never     Passive exposure: Past    Smokeless tobacco: Never    Tobacco comments:     SECOND HAND EXPOSURE    Vaping Use    Vaping status: Never Used   Substance Use Topics    Alcohol use: Never    Drug use: Never       Family History   Problem Relation Age of Onset    Stroke Mother     Diabetes Mother     Restless legs syndrome Mother     Hyperlipidemia Mother     Anesthesia problems Mother         slow to wake up    Hypertension Mother     Nephrolithiasis Father     Sleep apnea Father     Depression Father     Hypertension Father     Restless legs syndrome Sister     No Known Problems Brother     No Known Problems Maternal Aunt     No Known Problems Paternal Aunt     Diabetes Maternal  "Uncle     No Known Problems Paternal Uncle     No Known Problems Maternal Grandfather     Colon cancer Maternal Grandmother         50S    Cancer Maternal Grandmother     Colon cancer Paternal Grandfather         70S    No Known Problems Paternal Grandmother     No Known Problems Cousin     Breast cancer Other         20S    Diabetes Other     Cancer Paternal Aunt     ADD / ADHD Neg Hx     Alcohol abuse Neg Hx     Anxiety disorder Neg Hx     Bipolar disorder Neg Hx     Dementia Neg Hx     Drug abuse Neg Hx     OCD Neg Hx     Paranoid behavior Neg Hx     Schizophrenia Neg Hx     Seizures Neg Hx     Self-Injurious Behavior  Neg Hx     Suicide Attempts Neg Hx         Health Maintenance Due   Topic Date Due    Hepatitis B (1 of 3 - 19+ 3-dose series) Never done    Pneumococcal Vaccine 50+ (1 of 2 - PCV) Never done    ZOSTER VACCINE (1 of 2) Never done    COVID-19 Vaccine (1 - 2024-25 season) Never done    DIABETIC EYE EXAM  11/06/2024        Immunization History   Administered Date(s) Administered    Influenza, Unspecified 11/25/2019    Tdap 11/25/2019       Review of Systems   Constitutional:  Negative for chills, diaphoresis, fatigue and fever.   HENT:  Negative for congestion, sore throat and swollen glands.    Respiratory:  Negative for cough.    Cardiovascular:  Negative for chest pain.   Gastrointestinal:  Negative for abdominal pain, nausea and vomiting.   Genitourinary:  Negative for dysuria.   Musculoskeletal:  Positive for myalgias. Negative for neck pain.   Skin:  Negative for rash.   Neurological:  Negative for weakness and numbness.        Objective       Vitals:    04/17/25 1315   BP: 130/80   Pulse: 116   Resp: 18   Temp: 97.8 °F (36.6 °C)   SpO2: 97%   Weight: 99.7 kg (219 lb 12.8 oz)   Height: 152.4 cm (60\")       Body mass index is 42.93 kg/m².         Physical Exam  Constitutional:       Appearance: Normal appearance.   HENT:      Head: Normocephalic.   Pulmonary:      Effort: Pulmonary effort is " normal.   Musculoskeletal:      Right knee: Swelling and effusion present. Decreased range of motion. Tenderness present over the medial joint line and patellar tendon.        Legs:    Skin:     Findings: No bruising.   Neurological:      General: No focal deficit present.      Mental Status: She is alert and oriented to person, place, and time.   Psychiatric:         Mood and Affect: Mood normal.         Behavior: Behavior normal.         Thought Content: Thought content normal.         Judgment: Judgment normal.         Ace wrap applied to knee  Physical Exam  Musculoskeletal: Mild swelling in the right knee. Drawer test did not shift for an anterior ligament tear.              Result Review :     The following data was reviewed by: RAHEEM Burgos on 04/17/2025:            MRI Pituitary With & Without Contrast  Result Date: 2/25/2025  Impression: Previously noted tiny pituitary cyst is not seen on today's exam, either relating to this findings small size and technical differences in scanning or potentially resolved in the interval. The pituitary on today's exam appears homogeneously enhancing without suspicious focal lesion. Otherwise normal contrast-enhanced MRI of the brain. Electronically Signed: Delfino Aguilar MD  2/25/2025 1:10 PM EST  Workstation ID: [id number]    Mammo Diagnostic Digital Tomosynthesis Bilateral With CAD  Result Date: 1/29/2025  Benign bilateral mammogram. Recommend a bilateral screening examination in 1 year.    TISSUE DENSITY: There are scattered areas of fibroglandular density.  BI-RADS ASSESSMENT: BI-RADS 2. Benign findings.   Note: It has been reported that there is approximately a 15% false negative in mammography. Therefore, management of a palpable abnormality should not be deferred because of a negative mammogram.    Electronically Signed By-DIEGO FISCHER MD On:1/29/2025 1:18 PM      MRI Brain With & Without Contrast  Result Date: 12/30/2024  Impression: 1. Acute right  sphenoid sinusitis. 2. No acute intracranial abnormality. 3. No pathologic intracranial contrast enhancement. Electronically Signed: Austyn Sosa MD  12/30/2024 1:45 PM EST  Workstation ID: [id number]    MRI Angiogram Head Without Contrast  Result Date: 12/30/2024  Impression: 1. No intracranial vascular stenosis, occlusion, or aneurysm. No change from 12/10/2024. Electronically Signed: Austyn Sosa MD  12/30/2024 1:22 PM EST  Workstation ID: [id number]           Results                      Assessment and Plan      Assessment & Plan  Acute pain of right knee    Orders:    XR Knee 3 View Right; Future    XR Tibia Fibula 2 View Right; Future      No diagnosis found.      Assessment & Plan  1. Right knee pain.  - Blood pressure is within normal range; slight elevation in heart rate likely due to pain.  - Physical examination reveals mild swelling and tenderness in the knee, with no significant pain over the tendon above the kneecap.  - Discussed the possibility of tricompartmental arthritis and the need to rule out fractures via x-ray. Drawer test negative for anterior ligament tear.  - Ordered x-ray of the tibia, fibula, and knee. Advised application of ice for 10 minutes and use of Tylenol for pain. Increased meloxicam dosage recommended for a few days. Provided Ace wrap for knee support. MRI to be considered if x-ray results are normal.          Follow Up     No follow-ups on file.    Patient was given instructions and counseling regarding her condition or for health maintenance advice. Please see specific information pulled into the AVS if appropriate.     Parts of this note are electronic transcriptions/translations of spoken language to printed text using the Dragon Dictation system.          Audra Tran, APRN  04/17/2025

## 2025-04-28 ENCOUNTER — HOSPITAL ENCOUNTER (OUTPATIENT)
Dept: MRI IMAGING | Facility: HOSPITAL | Age: 55
Discharge: HOME | End: 2025-04-28
Admitting: NURSE PRACTITIONER
Payer: COMMERCIAL

## 2025-04-28 DIAGNOSIS — M25.561 ACUTE PAIN OF RIGHT KNEE: ICD-10-CM

## 2025-04-28 PROCEDURE — 73721 MRI JNT OF LWR EXTRE W/O DYE: CPT

## 2025-04-30 ENCOUNTER — PATIENT ROUNDING (BHMG ONLY) (OUTPATIENT)
Dept: FAMILY MEDICINE CLINIC | Facility: CLINIC | Age: 55
End: 2025-04-30
Payer: COMMERCIAL

## 2025-05-08 ENCOUNTER — OFFICE VISIT (OUTPATIENT)
Dept: ORTHOPEDIC SURGERY | Facility: CLINIC | Age: 55
End: 2025-05-08
Payer: COMMERCIAL

## 2025-05-08 VITALS
WEIGHT: 219 LBS | DIASTOLIC BLOOD PRESSURE: 86 MMHG | OXYGEN SATURATION: 93 % | BODY MASS INDEX: 43 KG/M2 | SYSTOLIC BLOOD PRESSURE: 125 MMHG | HEART RATE: 91 BPM | HEIGHT: 60 IN

## 2025-05-08 DIAGNOSIS — S83.241A TEAR OF MEDIAL MENISCUS OF RIGHT KNEE, CURRENT, UNSPECIFIED TEAR TYPE, INITIAL ENCOUNTER: Primary | ICD-10-CM

## 2025-05-08 DIAGNOSIS — M17.11 PRIMARY OSTEOARTHRITIS OF RIGHT KNEE: ICD-10-CM

## 2025-05-08 DIAGNOSIS — E66.01 OBESITY, MORBID, BMI 40.0-49.9: ICD-10-CM

## 2025-05-08 NOTE — PROGRESS NOTES
Chief Complaint  Initial Evaluation of the Right Knee       Subjective      Carlota Robin presents to [facility] for an evaluation  of her right knee. She was going down her stairs when he knee gave out on her and had increase in pain which happened several weeks ago. She went to her family doctor where she had x-rays and an MRI done on her right knee. She is ambulating today with crutches and has pain with weightbearing. She locates her pain to the lateral  and posterior  knee. She denies any prior surgery or injury to her right knee.     Allergies   Allergen Reactions    Pravastatin Sodium Nausea Only    Hydrocodone-Acetaminophen Hallucinations     OK TO TAKE REGULAR TYLENOL     Lisinopril Cough    Pollen Extract Other (See Comments)     CONGESTION, WATERY EYES, SNEEZING         Social History     Socioeconomic History    Marital status:      Spouse name: ELIZABETH ROBIN    Number of children: 2    Years of education: 12+    Highest education level: Some college, no degree   Tobacco Use    Smoking status: Never     Passive exposure: Past    Smokeless tobacco: Never    Tobacco comments:     SECOND HAND EXPOSURE    Vaping Use    Vaping status: Never Used   Substance and Sexual Activity    Alcohol use: Never    Drug use: Never    Sexual activity: Yes     Partners: Female     Birth control/protection: Hysterectomy        I reviewed the patient's chief complaint, history of present illness, review of systems, past medical history, surgical history, family history, social history, medications, and allergy list.     Review of Systems     Constitutional: Denies fevers, chills, weight loss  Cardiovascular: Denies chest pain, shortness of breath  Skin: Denies rashes, acute skin changes  Neurologic: Denies headache, loss of consciousness  MSK: right knee pain       Vital Signs:   /86 (BP Location: Left arm, Patient Position: Sitting, Cuff Size: Large Adult)   Pulse 91   Ht 152.4 cm  "(60\")   Wt 99.3 kg (219 lb)   SpO2 93%   BMI 42.77 kg/m²            Ortho Exam    Physical Exam  General:Alert. No acute distress   Right lower extremity: full extension, flexion  to 120 degrees, stable to varus/valgus stress, stable to anterior/posterior drawer , pain with Tree's, negative  Lachman's, tender to the lateral joint line, non tender to the medial joint line , calf soft, distal neurovascularly intact , positive  pulses, positive EHL, FHL, GS, and TA. Sensation intact to all 5 nerves of the foot.      Procedures    Imaging Results (Most Recent)       None             Result Review :       MRI Knee Right Without Contrast  Result Date: 4/29/2025  Narrative: MRI KNEE RIGHT  WO CONTRAST Date of Exam: 4/28/2025 2:38 PM EDT Indication: knee pain/poss tear.  Comparison: 4/17/2025 radiographs Technique:  Routine multiplanar/multisequence images of the right knee were obtained without contrast administration. Findings: There is increased signal within the posterior root of the medial meniscus as seen on series 11 image 12, series 9 image 18, and series 12 image 17 consistent with radial tear. There is minimal extrusion of the medial meniscal body which is otherwise intact. The lateral meniscus is intact. Anterior and posterior cruciate ligaments are intact. The medial collateral ligament is intact. Distal iliotibial band is intact. Lateral collateral ligamentous complex is intact. Extensor mechanism is intact. Trace knee joint effusion. Trace Eid's cyst which may be partially ruptured. The patellofemoral compartment articular cartilage is intact. Lateral compartment articular cartilage is intact. There is high-grade partial-thickness articular cartilage loss along the anterior central aspect of the weightbearing medial femoral condyle. Subchondral cystic change is present at the site. No evidence of acute fracture or suspicious bone lesion. The remaining visualized soft tissues are within normal limits. "     Impression: Impression: 1.Radial tear of the posterior root of the medial meniscus. 2.High-grade partial-thickness articular cartilage loss along the anterior central aspect of the weightbearing medial femoral condyle with subchondral cystic change. 3.Trace knee joint effusion. Trace Eid's cyst which may be partially ruptured. Electronically Signed: Jose Shafer MD  4/29/2025 8:33 PM EDT  Workstation ID: [id number]    XR Knee 3 View Right  Result Date: 4/18/2025  Narrative: XR TIBIA FIBULA 2 VW RIGHT, XR KNEE 3 VW RIGHT Date of Exam: 4/17/2025 2:23 PM EDT Indication: leg pain Comparison: None available. FINDINGS:  Knee: No fracture is identified. Mineralization and alignment appear within normal limits. No definite joint effusion. Mild osteophyte formation with suspected mild tibiofemoral joint space narrowing. Tibia and fibula: There is enthesophyte formation at the calcaneus. There is focal cortical thickening at the lateral aspect of the distal tibial diaphysis. No fracture or other abnormality is identified. Mineralization appears grossly normal.     Impression: 1.Focal cortical thickening at the lateral aspect of the distal tibial diaphysis. This could be related to stress reaction or remote injury. Correlate for symptoms and history of injury in this location. MRI could be performed for further evaluation as clinically indicated. 2.Mild osteoarthritis of the knee. Electronically Signed: Sonny Lewis  4/18/2025 5:12 PM EDT  Workstation ID: [id number]    XR Tibia Fibula 2 View Right  Result Date: 4/18/2025  Narrative: XR TIBIA FIBULA 2 VW RIGHT, XR KNEE 3 VW RIGHT Date of Exam: 4/17/2025 2:23 PM EDT Indication: leg pain Comparison: None available. FINDINGS:  Knee: No fracture is identified. Mineralization and alignment appear within normal limits. No definite joint effusion. Mild osteophyte formation with suspected mild tibiofemoral joint space narrowing. Tibia and fibula: There is enthesophyte formation at  the calcaneus. There is focal cortical thickening at the lateral aspect of the distal tibial diaphysis. No fracture or other abnormality is identified. Mineralization appears grossly normal.     Impression: 1.Focal cortical thickening at the lateral aspect of the distal tibial diaphysis. This could be related to stress reaction or remote injury. Correlate for symptoms and history of injury in this location. MRI could be performed for further evaluation as clinically indicated. 2.Mild osteoarthritis of the knee. Electronically Signed: Sonny Debbie  4/18/2025 5:12 PM EDT  Workstation ID: [id number]             Assessment and Plan     There are no diagnoses linked to this encounter.    The patient presents here today for an evaluation  of her right knee. MRI results was discussed and reviewed with the patient today in the office that was obtained by her PCP.     Discussed operative treatment options regarding a right knee arthroscopy with partial medial  meniscus repair versus menisectomy, chondroplasty versus non operative treatment options regarding injections, medications and therapy.     Patient wishes to proceed with conservative treatment options at this time.     Discussed the risks and benefits of a right knee steroid injection today in the office. Patient expressed understanding and wishes to hold off at this time. She will call if and when she would like to proceed.     Order placed today for physical therapy.     Discussed giving her a normal knee brace today in the office bu she would like to continue wrapping with an acewrap at this time.      Educated on risk of elevated BMI.  Discussed options for weight loss/decreasing BMI prior to procedure including dietician consult, weight loss options and exercise program. and Call or return if worsening symptoms.    Follow Up     6 - 8 weeks     Patient was given instructions and counseling regarding her condition or for health maintenance advice. Please see  specific information pulled into the AVS if appropriate.     Scribed for Td Patel MD by Dorita Paniagua.  05/08/25   08:19 EDT    I have personally performed the services described in this document as scribed by the above individual and it is both accurate and complete. Td Patel MD 05/08/25

## 2025-05-14 ENCOUNTER — OFFICE VISIT (OUTPATIENT)
Dept: FAMILY MEDICINE CLINIC | Facility: CLINIC | Age: 55
End: 2025-05-14
Payer: COMMERCIAL

## 2025-05-14 VITALS
HEART RATE: 107 BPM | RESPIRATION RATE: 18 BRPM | TEMPERATURE: 97.3 F | OXYGEN SATURATION: 96 % | WEIGHT: 218.2 LBS | BODY MASS INDEX: 42.84 KG/M2 | HEIGHT: 60 IN | SYSTOLIC BLOOD PRESSURE: 128 MMHG | DIASTOLIC BLOOD PRESSURE: 84 MMHG

## 2025-05-14 DIAGNOSIS — J30.2 SEASONAL ALLERGIC RHINITIS, UNSPECIFIED TRIGGER: ICD-10-CM

## 2025-05-14 DIAGNOSIS — E03.9 ACQUIRED HYPOTHYROIDISM: ICD-10-CM

## 2025-05-14 DIAGNOSIS — Z12.11 SCREENING FOR COLON CANCER: ICD-10-CM

## 2025-05-14 DIAGNOSIS — D50.9 IRON DEFICIENCY ANEMIA, UNSPECIFIED IRON DEFICIENCY ANEMIA TYPE: ICD-10-CM

## 2025-05-14 DIAGNOSIS — K21.9 GASTROESOPHAGEAL REFLUX DISEASE WITHOUT ESOPHAGITIS: ICD-10-CM

## 2025-05-14 DIAGNOSIS — Z80.0 FAMILY HISTORY OF COLON CANCER: ICD-10-CM

## 2025-05-14 DIAGNOSIS — E55.9 VITAMIN D DEFICIENCY: ICD-10-CM

## 2025-05-14 DIAGNOSIS — F41.9 ANXIETY: ICD-10-CM

## 2025-05-14 DIAGNOSIS — G47.09 OTHER INSOMNIA: ICD-10-CM

## 2025-05-14 DIAGNOSIS — E53.8 VITAMIN B 12 DEFICIENCY: ICD-10-CM

## 2025-05-14 DIAGNOSIS — E11.65 TYPE 2 DIABETES MELLITUS WITH HYPERGLYCEMIA, WITHOUT LONG-TERM CURRENT USE OF INSULIN: ICD-10-CM

## 2025-05-14 DIAGNOSIS — Z00.00 ANNUAL PHYSICAL EXAM: Primary | ICD-10-CM

## 2025-05-14 DIAGNOSIS — I10 PRIMARY HYPERTENSION: ICD-10-CM

## 2025-05-14 DIAGNOSIS — M79.7 FIBROMYALGIA: ICD-10-CM

## 2025-05-14 DIAGNOSIS — E78.5 HYPERLIPIDEMIA, UNSPECIFIED HYPERLIPIDEMIA TYPE: ICD-10-CM

## 2025-05-14 DIAGNOSIS — R60.9 SWELLING: ICD-10-CM

## 2025-05-14 LAB
25(OH)D3 SERPL-MCNC: 61.1 NG/ML (ref 30–100)
ALBUMIN SERPL-MCNC: 4.1 G/DL (ref 3.5–5.2)
ALBUMIN UR-MCNC: 1.8 MG/DL
ALBUMIN/GLOB SERPL: 1.4 G/DL
ALP SERPL-CCNC: 107 U/L (ref 39–117)
ALT SERPL W P-5'-P-CCNC: 42 U/L (ref 1–33)
ANION GAP SERPL CALCULATED.3IONS-SCNC: 11 MMOL/L (ref 5–15)
AST SERPL-CCNC: 42 U/L (ref 1–32)
BASOPHILS # BLD AUTO: 0.06 10*3/MM3 (ref 0–0.2)
BASOPHILS NFR BLD AUTO: 0.7 % (ref 0–1.5)
BILIRUB SERPL-MCNC: 0.3 MG/DL (ref 0–1.2)
BUN SERPL-MCNC: 12 MG/DL (ref 6–20)
BUN/CREAT SERPL: 12.6 (ref 7–25)
CALCIUM SPEC-SCNC: 9.1 MG/DL (ref 8.6–10.5)
CHLORIDE SERPL-SCNC: 104 MMOL/L (ref 98–107)
CHOLEST SERPL-MCNC: 210 MG/DL (ref 0–200)
CO2 SERPL-SCNC: 23 MMOL/L (ref 22–29)
CREAT SERPL-MCNC: 0.95 MG/DL (ref 0.57–1)
CREAT UR-MCNC: 78.9 MG/DL
DEPRECATED RDW RBC AUTO: 45.3 FL (ref 37–54)
EGFRCR SERPLBLD CKD-EPI 2021: 70.9 ML/MIN/1.73
EOSINOPHIL # BLD AUTO: 0.19 10*3/MM3 (ref 0–0.4)
EOSINOPHIL NFR BLD AUTO: 2.2 % (ref 0.3–6.2)
ERYTHROCYTE [DISTWIDTH] IN BLOOD BY AUTOMATED COUNT: 14.1 % (ref 12.3–15.4)
FOLATE SERPL-MCNC: >20 NG/ML (ref 4.78–24.2)
GLOBULIN UR ELPH-MCNC: 3 GM/DL
GLUCOSE SERPL-MCNC: 127 MG/DL (ref 65–99)
HCT VFR BLD AUTO: 41.7 % (ref 34–46.6)
HDLC SERPL QL: 5.83
HDLC SERPL-MCNC: 36 MG/DL (ref 40–60)
HGB BLD-MCNC: 13.4 G/DL (ref 12–15.9)
IMM GRANULOCYTES # BLD AUTO: 0.04 10*3/MM3 (ref 0–0.05)
IMM GRANULOCYTES NFR BLD AUTO: 0.5 % (ref 0–0.5)
IRON 24H UR-MRATE: 68 MCG/DL (ref 37–145)
IRON SATN MFR SERPL: 15 % (ref 20–50)
LDLC SERPL CALC-MCNC: 117 MG/DL (ref 0–100)
LYMPHOCYTES # BLD AUTO: 2.51 10*3/MM3 (ref 0.7–3.1)
LYMPHOCYTES NFR BLD AUTO: 29.7 % (ref 19.6–45.3)
MCH RBC QN AUTO: 28.1 PG (ref 26.6–33)
MCHC RBC AUTO-ENTMCNC: 32.1 G/DL (ref 31.5–35.7)
MCV RBC AUTO: 87.4 FL (ref 79–97)
MICROALBUMIN/CREAT UR: 22.8 MG/G (ref 0–29)
MONOCYTES # BLD AUTO: 0.9 10*3/MM3 (ref 0.1–0.9)
MONOCYTES NFR BLD AUTO: 10.7 % (ref 5–12)
NEUTROPHILS NFR BLD AUTO: 4.75 10*3/MM3 (ref 1.7–7)
NEUTROPHILS NFR BLD AUTO: 56.2 % (ref 42.7–76)
NRBC BLD AUTO-RTO: 0 /100 WBC (ref 0–0.2)
PLATELET # BLD AUTO: 299 10*3/MM3 (ref 140–450)
PMV BLD AUTO: 10.2 FL (ref 6–12)
POTASSIUM SERPL-SCNC: 4.4 MMOL/L (ref 3.5–5.2)
PROT SERPL-MCNC: 7.1 G/DL (ref 6–8.5)
RBC # BLD AUTO: 4.77 10*6/MM3 (ref 3.77–5.28)
SODIUM SERPL-SCNC: 138 MMOL/L (ref 136–145)
T3FREE SERPL-MCNC: 3.4 PG/ML (ref 2–4.4)
T4 FREE SERPL-MCNC: 1.14 NG/DL (ref 0.92–1.68)
TIBC SERPL-MCNC: 462 MCG/DL (ref 298–536)
TRANSFERRIN SERPL-MCNC: 310 MG/DL (ref 200–360)
TRIGL SERPL-MCNC: 328 MG/DL (ref 0–150)
TSH SERPL DL<=0.05 MIU/L-ACNC: 2.21 UIU/ML (ref 0.27–4.2)
VIT B12 BLD-MCNC: 1795 PG/ML (ref 211–946)
VLDLC SERPL-MCNC: 57 MG/DL (ref 5–40)
WBC NRBC COR # BLD AUTO: 8.45 10*3/MM3 (ref 3.4–10.8)

## 2025-05-14 PROCEDURE — 80061 LIPID PANEL: CPT | Performed by: NURSE PRACTITIONER

## 2025-05-14 PROCEDURE — 82746 ASSAY OF FOLIC ACID SERUM: CPT | Performed by: NURSE PRACTITIONER

## 2025-05-14 PROCEDURE — 84443 ASSAY THYROID STIM HORMONE: CPT | Performed by: NURSE PRACTITIONER

## 2025-05-14 PROCEDURE — 82570 ASSAY OF URINE CREATININE: CPT | Performed by: NURSE PRACTITIONER

## 2025-05-14 PROCEDURE — 85025 COMPLETE CBC W/AUTO DIFF WBC: CPT | Performed by: NURSE PRACTITIONER

## 2025-05-14 PROCEDURE — 82607 VITAMIN B-12: CPT | Performed by: NURSE PRACTITIONER

## 2025-05-14 PROCEDURE — 84481 FREE ASSAY (FT-3): CPT | Performed by: NURSE PRACTITIONER

## 2025-05-14 PROCEDURE — 82043 UR ALBUMIN QUANTITATIVE: CPT | Performed by: NURSE PRACTITIONER

## 2025-05-14 PROCEDURE — 84466 ASSAY OF TRANSFERRIN: CPT | Performed by: NURSE PRACTITIONER

## 2025-05-14 PROCEDURE — 82306 VITAMIN D 25 HYDROXY: CPT | Performed by: NURSE PRACTITIONER

## 2025-05-14 PROCEDURE — 84439 ASSAY OF FREE THYROXINE: CPT | Performed by: NURSE PRACTITIONER

## 2025-05-14 PROCEDURE — 83540 ASSAY OF IRON: CPT | Performed by: NURSE PRACTITIONER

## 2025-05-14 PROCEDURE — 80053 COMPREHEN METABOLIC PANEL: CPT | Performed by: NURSE PRACTITIONER

## 2025-05-14 RX ORDER — ARIPIPRAZOLE 5 MG/1
7.5 TABLET ORAL DAILY
Qty: 45 TABLET | Refills: 5 | Status: SHIPPED | OUTPATIENT
Start: 2025-05-14

## 2025-05-14 RX ORDER — LEVOTHYROXINE SODIUM 25 UG/1
25 TABLET ORAL DAILY
Qty: 30 TABLET | Refills: 5 | Status: SHIPPED | OUTPATIENT
Start: 2025-05-14

## 2025-05-14 RX ORDER — HYDROCHLOROTHIAZIDE 12.5 MG/1
12.5 TABLET ORAL DAILY
Qty: 30 TABLET | Refills: 5 | Status: CANCELLED | OUTPATIENT
Start: 2025-05-14

## 2025-05-14 RX ORDER — MELOXICAM 7.5 MG/1
7.5 TABLET ORAL DAILY
Qty: 30 TABLET | Refills: 5 | Status: SHIPPED | OUTPATIENT
Start: 2025-05-14

## 2025-05-14 RX ORDER — HYDROCHLOROTHIAZIDE 25 MG/1
25 TABLET ORAL DAILY
Qty: 30 TABLET | Refills: 5 | Status: SHIPPED | OUTPATIENT
Start: 2025-05-14

## 2025-05-14 RX ORDER — MONTELUKAST SODIUM 10 MG/1
10 TABLET ORAL EVERY EVENING
Qty: 30 TABLET | Refills: 5 | Status: SHIPPED | OUTPATIENT
Start: 2025-05-14

## 2025-05-14 RX ORDER — PANTOPRAZOLE SODIUM 40 MG/1
40 TABLET, DELAYED RELEASE ORAL DAILY
Qty: 30 TABLET | Refills: 5 | Status: SHIPPED | OUTPATIENT
Start: 2025-05-14

## 2025-05-14 RX ORDER — ICOSAPENT ETHYL 1 G/1
2 CAPSULE ORAL 2 TIMES DAILY WITH MEALS
Qty: 60 CAPSULE | Refills: 5 | Status: SHIPPED | OUTPATIENT
Start: 2025-05-14

## 2025-05-14 RX ORDER — DULOXETIN HYDROCHLORIDE 60 MG/1
60 CAPSULE, DELAYED RELEASE ORAL DAILY
Qty: 30 CAPSULE | Refills: 5 | Status: SHIPPED | OUTPATIENT
Start: 2025-05-14

## 2025-05-14 RX ORDER — VITAMIN E (DL,TOCOPHERYL ACET) 90 MG
1 CAPSULE ORAL DAILY
Qty: 30 CAPSULE | Refills: 5 | Status: SHIPPED | OUTPATIENT
Start: 2025-05-14

## 2025-05-14 RX ORDER — IRON POLYSACCHARIDE COMPLEX 150 MG
150 CAPSULE ORAL DAILY
Qty: 30 CAPSULE | Refills: 5 | Status: SHIPPED | OUTPATIENT
Start: 2025-05-14

## 2025-05-14 RX ORDER — LOSARTAN POTASSIUM 25 MG/1
25 TABLET ORAL 2 TIMES DAILY
Qty: 60 TABLET | Refills: 5 | Status: SHIPPED | OUTPATIENT
Start: 2025-05-14

## 2025-05-14 RX ORDER — TRAZODONE HYDROCHLORIDE 50 MG/1
50 TABLET ORAL NIGHTLY
Qty: 30 TABLET | Refills: 5 | Status: SHIPPED | OUTPATIENT
Start: 2025-05-14

## 2025-05-14 RX ORDER — ERGOCALCIFEROL 1.25 MG/1
50000 CAPSULE, LIQUID FILLED ORAL WEEKLY
Qty: 5 CAPSULE | Refills: 5 | Status: SHIPPED | OUTPATIENT
Start: 2025-05-14

## 2025-05-14 RX ORDER — EZETIMIBE 10 MG/1
10 TABLET ORAL DAILY
Qty: 30 TABLET | Refills: 5 | Status: SHIPPED | OUTPATIENT
Start: 2025-05-14

## 2025-05-14 RX ORDER — CLONIDINE HYDROCHLORIDE 0.1 MG/1
0.1 TABLET ORAL NIGHTLY
Qty: 30 TABLET | Refills: 5 | Status: SHIPPED | OUTPATIENT
Start: 2025-05-14

## 2025-05-15 NOTE — ASSESSMENT & PLAN NOTE
Orders:    levothyroxine (SYNTHROID, LEVOTHROID) 25 MCG tablet; Take 1 tablet by mouth Daily.    T3, Free    T4, Free

## 2025-05-15 NOTE — ASSESSMENT & PLAN NOTE
Orders:    iron polysaccharides (Ferrex 150) 150 MG capsule; Take 1 capsule by mouth Daily.    Iron Profile

## 2025-05-15 NOTE — PROGRESS NOTES
Chief Complaint  Hypertension, Hyperlipidemia, Hypothyroidism, and Depression    Subjective          Carlota Robin presents to [facility]  History of Present Illness    History of Present Illness  The patient presents for a routine checkup.    She has expressed a desire to increase the dosage of her diuretic medication, hydrochlorothiazide, currently at 12.5 mg. She reports minimal edema, predominantly in her ankles and legs. She is not experiencing any chest pain or shortness of breath. Additionally, she reports no gastrointestinal symptoms such as diarrhea, nausea, or vomiting.    She has been offered an injection for her knee condition but has chosen to pursue conservative treatment initially. She is scheduled to start physical therapy soon. She reports an improvement in her knee condition. She has a scheduled appointment with Dr. Patel's physician assistant on 06/19/2025.    Her A1c was measured at 7.6 on 05/15/2025, which is slightly higher than previous readings. She had a mammogram in 01/2025, which will be due again in 01/2026. Her bone density test was done in 06/2024 and will be due in 06/2026. A urine microalbumin test was conducted on 10/31/2024, and she is agreeable to having another test this year. Her eye exam was done in 02/2025. She had a colonoscopy in 2019, and it is believed she needs another one in 2024, pending confirmation.    She reports no significant issues related to fibromyalgia.      Patient or patient representative verbalized consent for the use of Ambient Listening during the visit with  RAHEEM Burgos for chart documentation. 5/18/2025  07:03 EDT      Depression: Not at risk (4/17/2025)    PHQ-2     PHQ-2 Score: 0    and              Allergies  Pravastatin sodium, Hydrocodone-acetaminophen, Lisinopril, and Pollen extract    Social History     Tobacco Use    Smoking status: Never     Passive exposure: Past    Smokeless tobacco:  "Never    Tobacco comments:     SECOND HAND EXPOSURE    Vaping Use    Vaping status: Never Used   Substance Use Topics    Alcohol use: Never    Drug use: Never       Family History   Problem Relation Age of Onset    Stroke Mother     Diabetes Mother     Restless legs syndrome Mother     Hyperlipidemia Mother     Anesthesia problems Mother         slow to wake up    Hypertension Mother     Nephrolithiasis Father     Sleep apnea Father     Depression Father     Hypertension Father     Restless legs syndrome Sister     No Known Problems Brother     No Known Problems Maternal Aunt     No Known Problems Paternal Aunt     Diabetes Maternal Uncle     No Known Problems Paternal Uncle     No Known Problems Maternal Grandfather     Colon cancer Maternal Grandmother         50S    Cancer Maternal Grandmother     Colon cancer Paternal Grandfather         70S    No Known Problems Paternal Grandmother     No Known Problems Cousin     Breast cancer Other         20S    Diabetes Other     Cancer Paternal Aunt     ADD / ADHD Neg Hx     Alcohol abuse Neg Hx     Anxiety disorder Neg Hx     Bipolar disorder Neg Hx     Dementia Neg Hx     Drug abuse Neg Hx     OCD Neg Hx     Paranoid behavior Neg Hx     Schizophrenia Neg Hx     Seizures Neg Hx     Self-Injurious Behavior  Neg Hx     Suicide Attempts Neg Hx         Health Maintenance Due   Topic Date Due    Hepatitis B (1 of 3 - 19+ 3-dose series) Never done    COLORECTAL CANCER SCREENING  10/21/2024        Immunization History   Administered Date(s) Administered    Influenza, Unspecified 11/25/2019    Tdap 11/25/2019       Review of Systems   Neurological:  Positive for numbness.        Objective       Vitals:    05/14/25 1022   BP: 128/84   Pulse: 107   Resp: 18   Temp: 97.3 °F (36.3 °C)   SpO2: 96%   Weight: 99 kg (218 lb 3.2 oz)   Height: 152.4 cm (60\")       Body mass index is 42.61 kg/m².         Physical Exam  Vitals and nursing note reviewed.   Constitutional:       General: She " is not in acute distress.     Appearance: Normal appearance. She is well-developed. She is not ill-appearing.   HENT:      Right Ear: Tympanic membrane and ear canal normal.      Left Ear: Tympanic membrane and ear canal normal.      Nose: No congestion or rhinorrhea.      Mouth/Throat:      Pharynx: No oropharyngeal exudate or posterior oropharyngeal erythema.   Eyes:      Conjunctiva/sclera: Conjunctivae normal.   Neck:      Vascular: No carotid bruit.   Cardiovascular:      Rate and Rhythm: Normal rate and regular rhythm.      Heart sounds: Normal heart sounds. No murmur heard.  Pulmonary:      Effort: Pulmonary effort is normal.      Breath sounds: Normal breath sounds. No wheezing or rhonchi.   Musculoskeletal:      Cervical back: No tenderness.      Right lower leg: Edema present.      Left lower leg: Edema present.   Skin:     Findings: No bruising or rash.   Neurological:      General: No focal deficit present.      Mental Status: She is alert and oriented to person, place, and time. Mental status is at baseline.      Cranial Nerves: No cranial nerve deficit.      Motor: No weakness.   Psychiatric:         Mood and Affect: Mood and affect normal.         Behavior: Behavior normal.         Thought Content: Thought content normal.         Judgment: Judgment normal.           Physical Exam                Result Review :     The following data was reviewed by: RAHEEM Burgos on 05/14/2025:            MRI Knee Right Without Contrast  Result Date: 4/29/2025  Impression: 1.Radial tear of the posterior root of the medial meniscus. 2.High-grade partial-thickness articular cartilage loss along the anterior central aspect of the weightbearing medial femoral condyle with subchondral cystic change. 3.Trace knee joint effusion. Trace Eid's cyst which may be partially ruptured. Electronically Signed: Jose Shafer MD  4/29/2025 8:33 PM EDT  Workstation ID: [id number]    XR Knee 3 View Right  Result Date:  4/18/2025  1.Focal cortical thickening at the lateral aspect of the distal tibial diaphysis. This could be related to stress reaction or remote injury. Correlate for symptoms and history of injury in this location. MRI could be performed for further evaluation as clinically indicated. 2.Mild osteoarthritis of the knee. Electronically Signed: Sonny Lewis  4/18/2025 5:12 PM EDT  Workstation ID: [id number]    XR Tibia Fibula 2 View Right  Result Date: 4/18/2025  1.Focal cortical thickening at the lateral aspect of the distal tibial diaphysis. This could be related to stress reaction or remote injury. Correlate for symptoms and history of injury in this location. MRI could be performed for further evaluation as clinically indicated. 2.Mild osteoarthritis of the knee. Electronically Signed: Sonny Lewis  4/18/2025 5:12 PM EDT  Workstation ID: [id number]    MRI Pituitary With & Without Contrast  Result Date: 2/25/2025  Impression: Previously noted tiny pituitary cyst is not seen on today's exam, either relating to this findings small size and technical differences in scanning or potentially resolved in the interval. The pituitary on today's exam appears homogeneously enhancing without suspicious focal lesion. Otherwise normal contrast-enhanced MRI of the brain. Electronically Signed: Delfino Aguilar MD  2/25/2025 1:10 PM EST  Workstation ID: [id number]    Mammo Diagnostic Digital Tomosynthesis Bilateral With CAD  Result Date: 1/29/2025  Benign bilateral mammogram. Recommend a bilateral screening examination in 1 year.    TISSUE DENSITY: There are scattered areas of fibroglandular density.  BI-RADS ASSESSMENT: BI-RADS 2. Benign findings.   Note: It has been reported that there is approximately a 15% false negative in mammography. Therefore, management of a palpable abnormality should not be deferred because of a negative mammogram.    Electronically Signed By-DIEGO FISCHER MD On:1/29/2025 1:18 PM             Results  Labs   -  A1c: 05/15/2025, 7.6                    Assessment and Plan      Assessment & Plan  Annual physical exam  ADVICE WAS GIVEN RE:  ALCOHOL USE, SEATBELT USE, REGULAR EXERCISE, HEALTHY DIET, ROUTINE EYE AND DENTAL EXAMS    Orders:    CBC Auto Differential    Comprehensive Metabolic Panel    Lipid Panel With / Chol / HDL Ratio    TSH    Anxiety    Orders:    ARIPiprazole (ABILIFY) 5 MG tablet; Take 1.5 tablets by mouth Daily.    DULoxetine (CYMBALTA) 60 MG capsule; Take 1 capsule by mouth Daily.    Vitamin D deficiency    Orders:    Calcium Carbonate 1500 (600 Ca) MG tablet; Take 1 tablet by mouth Daily.    vitamin D (ERGOCALCIFEROL) 1.25 MG (14808 UT) capsule capsule; Take 1 capsule by mouth 1 (One) Time Per Week.    Vitamin D,25-Hydroxy    Gastroesophageal reflux disease without esophagitis    Orders:    cimetidine (TAGAMET) 200 MG tablet; Take 1 tablet by mouth 2 (Two) Times a Day Before Meals.    pantoprazole (PROTONIX) 40 MG EC tablet; Take 1 tablet by mouth Daily.    Primary hypertension      Orders:    cloNIDine (CATAPRES) 0.1 MG tablet; Take 1 tablet by mouth Every Night.    losartan (COZAAR) 25 MG tablet; Take 1 tablet by mouth 2 (Two) Times a Day.    hydroCHLOROthiazide 25 MG tablet; Take 1 tablet by mouth Daily.    Fibromyalgia    Orders:    DULoxetine (CYMBALTA) 60 MG capsule; Take 1 capsule by mouth Daily.    meloxicam (MOBIC) 7.5 MG tablet; Take 1 tablet by mouth Daily.    Vitamin E 90 MG (200 UNIT) capsule; Take 1 capsule by mouth Daily.    Hyperlipidemia, unspecified hyperlipidemia type       Orders:    ezetimibe (Zetia) 10 MG tablet; Take 1 tablet by mouth Daily.    icosapent ethyl (VASCEPA) 1 g capsule capsule; Take 2 g by mouth 2 (Two) Times a Day With Meals.    Iron deficiency anemia, unspecified iron deficiency anemia type    Orders:    iron polysaccharides (Ferrex 150) 150 MG capsule; Take 1 capsule by mouth Daily.    Iron Profile    Acquired hypothyroidism    Orders:    levothyroxine (SYNTHROID,  LEVOTHROID) 25 MCG tablet; Take 1 tablet by mouth Daily.    T3, Free    T4, Free    Seasonal allergic rhinitis, unspecified trigger    Orders:    montelukast (SINGULAIR) 10 MG tablet; Take 1 tablet by mouth Every Evening.    Other insomnia    Orders:    traZODone (DESYREL) 50 MG tablet; Take 1 tablet by mouth Every Night.    Screening for colon cancer    Orders:    Ambulatory Referral For Screening Colonoscopy    Family history of colon cancer    Orders:    Ambulatory Referral For Screening Colonoscopy    Type 2 diabetes mellitus with hyperglycemia, without long-term current use of insulin      Orders:    Microalbumin / Creatinine Urine Ratio - Urine, Clean Catch    Vitamin B 12 deficiency    Orders:    Vitamin B12 & Folate    Swelling    Orders:    hydroCHLOROthiazide 25 MG tablet; Take 1 tablet by mouth Daily.       Diagnosis Plan   1. Annual physical exam  CBC Auto Differential    Comprehensive Metabolic Panel    Lipid Panel With / Chol / HDL Ratio    TSH      2. Anxiety  ARIPiprazole (ABILIFY) 5 MG tablet    DULoxetine (CYMBALTA) 60 MG capsule      3. Vitamin D deficiency  Calcium Carbonate 1500 (600 Ca) MG tablet    vitamin D (ERGOCALCIFEROL) 1.25 MG (31581 UT) capsule capsule    Vitamin D,25-Hydroxy      4. Gastroesophageal reflux disease without esophagitis  cimetidine (TAGAMET) 200 MG tablet    pantoprazole (PROTONIX) 40 MG EC tablet      5. Primary hypertension  cloNIDine (CATAPRES) 0.1 MG tablet    losartan (COZAAR) 25 MG tablet    hydroCHLOROthiazide 25 MG tablet      6. Fibromyalgia  DULoxetine (CYMBALTA) 60 MG capsule    meloxicam (MOBIC) 7.5 MG tablet    Vitamin E 90 MG (200 UNIT) capsule      7. Hyperlipidemia, unspecified hyperlipidemia type  ezetimibe (Zetia) 10 MG tablet    icosapent ethyl (VASCEPA) 1 g capsule capsule      8. Iron deficiency anemia, unspecified iron deficiency anemia type  iron polysaccharides (Ferrex 150) 150 MG capsule    Iron Profile      9. Acquired hypothyroidism   levothyroxine (SYNTHROID, LEVOTHROID) 25 MCG tablet    T3, Free    T4, Free      10. Seasonal allergic rhinitis, unspecified trigger  montelukast (SINGULAIR) 10 MG tablet      11. Other insomnia  traZODone (DESYREL) 50 MG tablet      12. Screening for colon cancer  Ambulatory Referral For Screening Colonoscopy      13. Family history of colon cancer  Ambulatory Referral For Screening Colonoscopy      14. Type 2 diabetes mellitus with hyperglycemia, without long-term current use of insulin  Microalbumin / Creatinine Urine Ratio - Urine, Clean Catch      15. Vitamin B 12 deficiency  Vitamin B12 & Folate      16. Swelling  hydroCHLOROthiazide 25 MG tablet            Assessment & Plan  1. Health maintenance.  Her last mammogram was conducted in January 2025, with the next one due in January 2026. The most recent bone density test was performed in June 2024, and the subsequent one is scheduled for June 2026. A urine microalbumin test was completed on October 31, 2024. Her A1c level, measured on 05/15/2025, was 7.6, which is slightly elevated compared to previous readings. An eye examination was conducted in February 2025. Her last colonoscopy was performed in 2019 by Dr. Bañuelos. She maintains regular dental check-ups and consistently uses her seatbelt. She is advised to receive the shingles vaccine at her local pharmacy. A urine microalbumin test will be ordered for this year. Additionally, tests for kidney function, liver function, sodium, potassium, cholesterol, thyroid, vitamin D, and B12 levels will be conducted. A screening colonoscopy will be scheduled with Dr. Bañuelos.    2. Knee pain.  She opted for conservative treatment for her knee pain and is preparing to start physical therapy. She reports that her knee is feeling better. She is advised to continue using the Ace wrap and monitor for any changes. She has an upcoming appointment with Dr. Patel's physician assistant on 06/19/2025.    3. Medication  management.  She is currently taking Abilify 1.5 mg, calcium daily, Tagamet twice a day, clonidine 0.1 mg, Cymbalta 60 mg, Zetia 10 mg, Vascepa, iron supplements, levothyroxine, losartan twice a day, meloxicam once a day, Singulair for allergies, Protonix for reflux, trazodone for sleep, vitamin D and E supplements. Her hydrochlorothiazide dosage will be increased to 25 mg to manage swelling and tightness in her hands and wrists. If there is no improvement within a 2 to 3-week period, a switch to Lasix 20 mg will be considered. She is advised to complete her current supply of hydrochlorothiazide 12.5 mg by taking two tablets daily before starting the new prescription. All refills will be sent to pharmacy.    4. Diabetes Mellitus.  Her A1c level was 7.6 on 05/15/2025, which is slightly higher than before but not at the initial levels. No A1c lab test will be done today as it is too soon for insurance coverage.    5. Fibromyalgia.  She reports no major fibromyalgia issues while on Cymbalta 60 mg.          Follow Up     Return in about 6 months (around 11/14/2025).    Patient was given instructions and counseling regarding her condition or for health maintenance advice. Please see specific information pulled into the AVS if appropriate.     Parts of this note are electronic transcriptions/translations of spoken language to printed text using the Dragon Dictation system.          Audra Tran, APRN  05/14/2025

## 2025-05-15 NOTE — ASSESSMENT & PLAN NOTE
Orders:    Calcium Carbonate 1500 (600 Ca) MG tablet; Take 1 tablet by mouth Daily.    vitamin D (ERGOCALCIFEROL) 1.25 MG (78393 UT) capsule capsule; Take 1 capsule by mouth 1 (One) Time Per Week.    Vitamin D,25-Hydroxy

## 2025-05-15 NOTE — ASSESSMENT & PLAN NOTE
Orders:    ezetimibe (Zetia) 10 MG tablet; Take 1 tablet by mouth Daily.    icosapent ethyl (VASCEPA) 1 g capsule capsule; Take 2 g by mouth 2 (Two) Times a Day With Meals.

## 2025-05-15 NOTE — ASSESSMENT & PLAN NOTE
Orders:    cloNIDine (CATAPRES) 0.1 MG tablet; Take 1 tablet by mouth Every Night.    losartan (COZAAR) 25 MG tablet; Take 1 tablet by mouth 2 (Two) Times a Day.    hydroCHLOROthiazide 25 MG tablet; Take 1 tablet by mouth Daily.

## 2025-05-30 DIAGNOSIS — I10 PRIMARY HYPERTENSION: ICD-10-CM

## 2025-05-30 RX ORDER — AMLODIPINE BESYLATE 5 MG/1
5 TABLET ORAL DAILY
Qty: 30 TABLET | Refills: 5 | OUTPATIENT
Start: 2025-05-30

## 2025-06-19 ENCOUNTER — OFFICE VISIT (OUTPATIENT)
Dept: ORTHOPEDIC SURGERY | Facility: CLINIC | Age: 55
End: 2025-06-19
Payer: COMMERCIAL

## 2025-06-19 VITALS
BODY MASS INDEX: 42.8 KG/M2 | DIASTOLIC BLOOD PRESSURE: 84 MMHG | OXYGEN SATURATION: 94 % | HEIGHT: 60 IN | SYSTOLIC BLOOD PRESSURE: 121 MMHG | HEART RATE: 102 BPM | WEIGHT: 218 LBS

## 2025-06-19 DIAGNOSIS — M17.11 PRIMARY OSTEOARTHRITIS OF RIGHT KNEE: ICD-10-CM

## 2025-06-19 DIAGNOSIS — S83.241A TEAR OF MEDIAL MENISCUS OF RIGHT KNEE, CURRENT, UNSPECIFIED TEAR TYPE, INITIAL ENCOUNTER: Primary | ICD-10-CM

## 2025-06-19 NOTE — PROGRESS NOTES
Chief Complaint  Follow-up of the Right Knee    Subjective          History of Present Illness      Carlota Robin is a 54 y.o. female  presents to [facility] for     Patient presents for follow-up evaluation of right knee pain right knee osteoarthritis and medial meniscus tear.  She states that the therapy she chose to do has helped she [facility] [place] states that that her knee is stronger and the pain has resolved she states she is gone 3 days without using Ace wrap's for compression.  When she had pain it was in the lateral posterior knee and she states this has resolved she denies need for further treatment at this time she has a few more weeks of therapy that she will attend otherwise patient is happy with her progress.      Allergies   Allergen Reactions    Pravastatin Sodium Nausea Only    Hydrocodone-Acetaminophen Hallucinations     OK TO TAKE REGULAR TYLENOL     Lisinopril Cough    Pollen Extract Other (See Comments)     CONGESTION, WATERY EYES, SNEEZING         Social History     Socioeconomic History    Marital status:      Spouse name: ELIZABETH ROBIN    Number of children: 2    Years of education: 12+    Highest education level: Some college, no degree   Tobacco Use    Smoking status: Never     Passive exposure: Past    Smokeless tobacco: Never    Tobacco comments:     SECOND HAND EXPOSURE    Vaping Use    Vaping status: Never Used   Substance and Sexual Activity    Alcohol use: Never    Drug use: Never    Sexual activity: Yes     Partners: Female     Birth control/protection: Hysterectomy        REVIEW OF SYSTEMS    Constitutional: Awake alert and oriented x3, no acute distress, denies fevers, chills, weight loss  Respiratory: No respiratory distress  Vascular: Brisk cap refill, Intact distal pulses, No cyanosis, compartments soft with no signs or symptoms of compartment syndrome or DVT.   Cardiovascular: Denies chest pain, shortness of  "breath  Skin: Denies rashes, acute skin changes  Neurologic: Denies headache, loss of consciousness  MSK: Right knee pain      Objective   Vital Signs:   /84   Pulse 102   Ht 152.4 cm (60\")   Wt 98.9 kg (218 lb)   SpO2 94%   BMI 42.58 kg/m²     Body mass index is 42.58 kg/m².    Physical Exam       Right knee: Skin is intact, no erythema, no ecchymosis, no swelling, no effusion, no signs of infection, full extension, flexion 120, stable anterior/posterior drawer, stable to varus/valgus stress.  Nontender to palpation, no pain with range of motion. Negative Tree, Negative Lachman.      Procedures    Imaging Results (Most Recent)       None                   Assessment and Plan    Diagnoses and all orders for this visit:    1. Tear of medial meniscus of right knee, current, unspecified tear type, initial encounter (Primary)    2. Primary osteoarthritis of right knee        Discussed diagnosis and treatment options with the patient she was advised to continue activity as tolerated Home exercises and physical therapy as tolerated follow-up as needed.  Patient was advised future treatments might include injections or surgery if worsening symptoms occur.    Call or return if worsening symptoms.    Follow Up   Return if symptoms worsen or fail to improve.  Patient was given instructions and counseling regarding her condition or for health maintenance advice. Please see specific information pulled into the AVS if appropriate.       EMR Dragon/Transcription disclaimer:  Part of this note may be an electronic transcription/translation of spoken language to printed text using the Dragon Dictation System  "

## 2025-07-02 DIAGNOSIS — E11.65 TYPE 2 DIABETES MELLITUS WITH HYPERGLYCEMIA, UNSPECIFIED WHETHER LONG TERM INSULIN USE: ICD-10-CM

## 2025-07-03 RX ORDER — TIRZEPATIDE 15 MG/.5ML
15 INJECTION, SOLUTION SUBCUTANEOUS
Qty: 2 ML | Refills: 5 | Status: SHIPPED | OUTPATIENT
Start: 2025-07-03

## 2025-07-05 DIAGNOSIS — I10 PRIMARY HYPERTENSION: ICD-10-CM

## 2025-07-07 RX ORDER — AMLODIPINE BESYLATE 5 MG/1
5 TABLET ORAL DAILY
Qty: 30 TABLET | Refills: 5 | OUTPATIENT
Start: 2025-07-07

## 2025-07-14 NOTE — PROGRESS NOTES
Chief Complaint  Diabetes (Med management, A1C, CGM Eval)    Referred By: MADINA Burgos*    Patient or patient representative verbalized consent for the use of Ambient Listening during the visit with  RAHEEM Garcia for chart documentation. 7/15/2025  08:34 EDT    Subjective          Carlota Robin presents to [facility] for diabetes medication management    History of Present Illness    History of Present Illness  The patient presents for evaluation of diabetes mellitus, anxiety, and sleep issues.    She reports that her blood sugar levels have been largely under control, although she has been experiencing intense sugar cravings. She recently attended a family reunion in [place] where she indulged in sugary foods. She is not experiencing frequent thirst or urination, but reports persistent fatigue. Her appetite remains unchanged. She is currently on a regimen of Mounjaro 15 mg once a week, Lantus 72 units twice a day, Humalog 20 units three times a day, and Jardiance 10 mg daily.    She is on Cymbalta for anxiety and takes trazodone as needed for sleep.    She had a torn meniscus in her knee but has recovered and is off crutches. She was upstairs when her knee gave out. She is no longer doing physical therapy.    She was told she had too much cortisol in her body and is taking some herbs to help with that.    Social History:  Hobbies: Camping, boating  Sleep: Takes trazodone as needed for sleep    PAST SURGICAL HISTORY:  - Torn meniscus in knee  - Benign pituitary tumor (resolved)         Visit type:  follow-up  Diabetes type:  Type 2  Current diabetes status/concerns/issues: Last visit Jardiance 10 mg once daily was prescribed.  She reports she is tolerating this medication well without any side effects.  Reports she has been craving sugar more lately.  States she is also been to a family reunion and encountered more carbs and sugars.  Other health  concerns: reports she finished PT for a torn mensicus.   Current Diabetes symptoms:    Polyuria: No   Polydipsia: No   Polyphagia: No   Blurred vision: No   Excessive fatigue: Yes    Known Diabetes complications:  Neuropathy: None; Location: N/A  Renal: None  Eyes: None; Location: N/A; Last Eye Exam:  2022 ; Location: [facility]  Amputation/Wounds: None  GI: Reflux  Cardiovascular: Hypertension and Hyperlipidemia  ED: N/A  Other: None  Hypoglycemia:  None reported at this time and 0% per CGM  Hypoglycemia Symptoms:  No hypoglycemia at this time  Current diabetes treatment:  Mounjaro 15 mg weekly, Lantus 72 units bid, Humalog 20 units tid with meals , Jardiance 10mg qd   Blood glucose device:  [employer] CGM  Blood glucose monitoring frequency:  Continuous per CGM  Blood glucose range/average:  The 14-day sensor report shows an average glucose of 127mg/dL, with 99% in target range ( mgdL), 1% in the high range (181-250 mg/dL), 0% in the very high range (>250 mg/dL), 0% in the low range (54-70 mg/dL) and 0% in the very low range (<54 mg/dL).   Glucose Source: Device Reviewed  Dietary behavior:  Avoids sugary drinks, Number of meals each day - 3; Number of snacks each day - 1-2, she eliminated sugar from her diet and decreased her carb intake.   Activity/Exercise: walking, stretches, dancing to the oldies    Past Medical History:   Diagnosis Date    Acne     Allergic     Allergic rhinitis due to allergen 04/15/2021    Allergies     Anemia 12/03/2020    Anesthesia     HARD TO WAKE UP POSTOP    Anxiety 04/15/2021    Asthma     INHALERS PRN    Bundle branch block, right     SEE'S DR BENNETT IN 2019, NOW SEES ON AS NEEDED BASIS, MAINLY FOLLOW PCP ROUTINELY. DENIED CP/SOB    Depression     Diabetes     Diabetes mellitus, type 2 06/08/2020    DOESN'T CHECK BG AT HOME    Essential hypertension 06/08/2020    Fatigue 04/15/2021    Fibromyalgia     GERD (gastroesophageal reflux disease) 12/03/2020    High cholesterol      Hyperlipidemia 2020    Hypothyroidism 2020    Inguinal hernia     Insomnia     Macromastia     Migraine     Obesity 2020    DANY (obstructive sleep apnea) 2020    Palpitation     NO PROBLEMS CURRENTLY. FOLLOWS PCP    Right bundle branch block 2020    SEE'S DR BLUE ON AS NEEDED BASIS, SEE'S PCP ON YEARLY. DENIED CP/SOB    RLS (restless legs syndrome)     Seasonal allergies     Sinus trouble     Stomach pain 2016    Stress 2016    Thyroid disease     Urinary incontinence     Vitamin B 12 deficiency     Vitamin D deficiency 2020     Past Surgical History:   Procedure Laterality Date    BILATERAL BREAST REDUCTION Bilateral 2022    Procedure: BREAST REDUCTION;  Surgeon: Zeeshan Bone MD;  Location: [facility] OR [facility];  Service: Plastics;  Laterality: Bilateral;    BREAST BIOPSY Right      SECTION      CHOLECYSTECTOMY      COLONOSCOPY      CYSTOSCOPY BOTOX INJECTION OF BLADDER  2019    ENDOSCOPY      HERNIA REPAIR      HYSTERECTOMY  2017    WISDOM TOOTH EXTRACTION       Family History   Problem Relation Age of Onset    Stroke Mother     Diabetes Mother     Restless legs syndrome Mother     Hyperlipidemia Mother     Anesthesia problems Mother         slow to wake up    Hypertension Mother     Nephrolithiasis Father     Sleep apnea Father     Depression Father     Hypertension Father     Restless legs syndrome Sister     No Known Problems Brother     No Known Problems Maternal Aunt     No Known Problems Paternal Aunt     Diabetes Maternal Uncle     No Known Problems Paternal Uncle     No Known Problems Maternal Grandfather     Colon cancer Maternal Grandmother         50S    Cancer Maternal Grandmother     Colon cancer Paternal Grandfather         70S    No Known Problems Paternal Grandmother     No Known Problems Cousin     Breast cancer Other         20S    Diabetes Other     Cancer Paternal Aunt     ADD / ADHD Neg Hx     Alcohol abuse Neg Hx     Anxiety  disorder Neg Hx     Bipolar disorder Neg Hx     Dementia Neg Hx     Drug abuse Neg Hx     OCD Neg Hx     Paranoid behavior Neg Hx     Schizophrenia Neg Hx     Seizures Neg Hx     Self-Injurious Behavior  Neg Hx     Suicide Attempts Neg Hx      Social History     Socioeconomic History    Marital status:      Spouse name: ELIZABETH DIAZ    Number of children: 2    Years of education: 12+    Highest education level: Some college, no degree   Tobacco Use    Smoking status: Never     Passive exposure: Past    Smokeless tobacco: Never    Tobacco comments:     SECOND HAND EXPOSURE    Vaping Use    Vaping status: Never Used   Substance and Sexual Activity    Alcohol use: Never    Drug use: Never    Sexual activity: Yes     Partners: Female     Birth control/protection: Hysterectomy     Allergies   Allergen Reactions    Pravastatin Sodium Nausea Only    Hydrocodone-Acetaminophen Hallucinations     OK TO TAKE REGULAR TYLENOL     Lisinopril Cough    Pollen Extract Other (See Comments)     CONGESTION, WATERY EYES, SNEEZING        Current Outpatient Medications:     albuterol (ProAir RespiClick) 108 (90 Base) MCG/ACT inhaler, Inhale 2 puffs Every 6 (Six) Hours As Needed for Wheezing or Shortness of Air., Disp: 18 g, Rfl: 2    amLODIPine (NORVASC) 10 MG tablet, Take 1 tablet by mouth Daily., Disp: , Rfl:     amphetamine-dextroamphetamine (ADDERALL) 10 MG tablet, Take 1 tablet by mouth Daily., Disp: , Rfl:     ARIPiprazole (ABILIFY) 5 MG tablet, Take 1.5 tablets by mouth Daily., Disp: 45 tablet, Rfl: 5    Blood Glucose Monitoring Suppl (FreeStyle Lite) w/Device kit, , Disp: , Rfl:     buPROPion XL (WELLBUTRIN XL) 150 MG 24 hr tablet, Take 1 tablet by mouth Daily., Disp: , Rfl:     Calcium Carbonate 1500 (600 Ca) MG tablet, Take 1 tablet by mouth Daily., Disp: 30 tablet, Rfl: 5    cetirizine (zyrTEC) 10 MG tablet, Take 1 tablet by mouth Daily., Disp: , Rfl:     cimetidine (TAGAMET) 200 MG tablet, Take 1 tablet by mouth 2 (Two)  Times a Day Before Meals., Disp: 60 tablet, Rfl: 5    cloNIDine (CATAPRES) 0.1 MG tablet, Take 1 tablet by mouth Every Night., Disp: 30 tablet, Rfl: 5    Continuous Glucose Sensor (FreeStyle Harley 3 Sensor) misc, 1 each by Other route Every 14 (Fourteen) Days., Disp: 2 each, Rfl: 5    cyanocobalamin (VITAMIN B-12) 1000 MCG tablet, Vitamin B-12 1,000 mcg oral tablet take 1 tablet by oral route daily   Active, Disp: , Rfl:     DULoxetine (CYMBALTA) 60 MG capsule, Take 1 capsule by mouth Daily., Disp: 30 capsule, Rfl: 5    empagliflozin (Jardiance) 10 MG tablet tablet, Take 1 tablet by mouth Daily for 180 days., Disp: 90 tablet, Rfl: 1    ezetimibe (Zetia) 10 MG tablet, Take 1 tablet by mouth Daily., Disp: 30 tablet, Rfl: 5    FREESTYLE LITE test strip, Use as directed daily, Disp: 100 each, Rfl: 1    hydroCHLOROthiazide 25 MG tablet, Take 1 tablet by mouth Daily., Disp: 30 tablet, Rfl: 5    icosapent ethyl (VASCEPA) 1 g capsule capsule, Take 2 g by mouth 2 (Two) Times a Day With Meals., Disp: 60 capsule, Rfl: 5    Insulin Glargine (Lantus SoloStar) 100 UNIT/ML injection pen, Inject 72 Units under the skin into the appropriate area as directed 2 (Two) Times a Day for 90 days., Disp: 130 mL, Rfl: 1    Insulin Lispro, 1 Unit Dial, (HUMALOG) 100 UNIT/ML solution pen-injector, Inject 20 Units under the skin into the appropriate area as directed 3 times a day for 90 days. max daily dose of 60 units, Disp: 54 mL, Rfl: 1    Insulin Pen Needle (Pen Needles) 32G X 6 MM misc, Use 1 each 5 (Five) Times a Day., Disp: 450 each, Rfl: 1    iron polysaccharides (Ferrex 150) 150 MG capsule, Take 1 capsule by mouth Daily., Disp: 30 capsule, Rfl: 5    Lancets (freestyle) lancets, , Disp: , Rfl:     levothyroxine (SYNTHROID, LEVOTHROID) 25 MCG tablet, Take 1 tablet by mouth Daily., Disp: 30 tablet, Rfl: 5    losartan (COZAAR) 25 MG tablet, Take 1 tablet by mouth 2 (Two) Times a Day., Disp: 60 tablet, Rfl: 5    meloxicam (MOBIC) 7.5 MG  "tablet, Take 1 tablet by mouth Daily., Disp: 30 tablet, Rfl: 5    montelukast (SINGULAIR) 10 MG tablet, Take 1 tablet by mouth Every Evening., Disp: 30 tablet, Rfl: 5    Mounjaro 15 MG/0.5ML solution auto-injector, Inject 15 mg under the skin into the appropriate area as directed every 7 (seven) days., Disp: 2 mL, Rfl: 5    ondansetron (Zofran) 4 MG tablet, Take 1 tablet by mouth Every 8 (Eight) Hours As Needed for Nausea or Vomiting., Disp: 30 tablet, Rfl: 0    pantoprazole (PROTONIX) 40 MG EC tablet, Take 1 tablet by mouth Daily., Disp: 30 tablet, Rfl: 5    promethazine (PHENERGAN) 12.5 MG tablet, Take 1 tablet by mouth Every 6 (Six) Hours As Needed for Nausea or Vomiting., Disp: 20 tablet, Rfl: 0    saccharomyces boulardii (FLORASTOR) 250 MG capsule, Take 1 capsule by mouth 2 (Two) Times a Day., Disp: , Rfl:     solifenacin (VESICARE) 5 MG tablet, Vesicare 5 mg oral tablet take 1 tablet (5 mg) by oral route once daily   Suspended, Disp: , Rfl:     topiramate (TOPAMAX) 50 MG tablet, , Disp: , Rfl:     traZODone (DESYREL) 50 MG tablet, Take 1 tablet by mouth Every Night., Disp: 30 tablet, Rfl: 5    vitamin D (ERGOCALCIFEROL) 1.25 MG (95453 UT) capsule capsule, Take 1 capsule by mouth 1 (One) Time Per Week., Disp: 5 capsule, Rfl: 5    Vitamin E 90 MG (200 UNIT) capsule, Take 1 capsule by mouth Daily., Disp: 30 capsule, Rfl: 5    Objective     Vitals:    07/15/25 0810   BP: 119/79   BP Location: Left arm   Patient Position: Sitting   Cuff Size: Large Adult   Pulse: 87   SpO2: 97%   Weight: 99.3 kg (219 lb)   Height: 152.4 cm (60\")     Body mass index is 42.77 kg/m².    Physical Exam  Constitutional:       Appearance: Normal appearance. She is obese.      Comments: Severe Obesity (BMI >= 40) Pt Current BMI = 42.77     HENT:      Head: Normocephalic and atraumatic.      Right Ear: External ear normal.      Left Ear: External ear normal.      Nose: Nose normal.   Eyes:      Extraocular Movements: Extraocular movements " intact.      Conjunctiva/sclera: Conjunctivae normal.   Pulmonary:      Effort: Pulmonary effort is normal.   Musculoskeletal:         General: Normal range of motion.      Cervical back: Normal range of motion.   Skin:     General: Skin is warm and dry.   Neurological:      General: No focal deficit present.      Mental Status: She is alert and oriented to person, place, and time. Mental status is at baseline.   Psychiatric:         Mood and Affect: Mood normal.         Behavior: Behavior normal.         Thought Content: Thought content normal.         Judgment: Judgment normal.             Result Review :   The following data was reviewed by: RAHEEM Garcia on 07/15/2025:    Most Recent A1C          7/15/2025    08:23   HGBA1C Most Recent   Hemoglobin A1C 6.8        A1C Last 3 Results          1/14/2025    12:03 4/15/2025    08:25 7/15/2025    08:23   HGBA1C Last 3 Results   Hemoglobin A1C 7.20  7.6  6.8      A1c collected in the office today is 6.8%, indicating Controlled Type II diabetes.  This result is down from the prior result of 7.6% collected on 4/15/2025.    Glucose   Date Value Ref Range Status   07/15/2025 148 (H) 70 - 99 mg/dL Final     Comment:     Serial Number: [id number]:  [id number]     Point of care glucose in the office today is within normal limits for nonfasting glucose    Creatinine   Date Value Ref Range Status   05/14/2025 0.95 0.57 - 1.00 mg/dL Final   02/24/2025 0.70 0.60 - 1.30 mg/dL Final     Comment:     Serial Number: [id number]:  [id number]     eGFR   Date Value Ref Range Status   05/14/2025 70.9 >60.0 mL/min/1.73 Final   02/24/2025 102.3 >60.0 mL/min/1.73 Final     Labs collected on 5/14/2025 show Stage II mild (GFR = 60-89mL/min)    Microalbumin, Urine   Date Value Ref Range Status   05/14/2025 1.8 mg/dL Final   10/30/2024 <1.2 mg/dL Final     Creatinine, Urine   Date Value Ref Range Status   05/14/2025 78.9 mg/dL Final   10/30/2024 41.5 mg/dL Final      Microalbumin/Creatinine Ratio   Date Value Ref Range Status   05/14/2025 22.8 0.0 - 29.0 mg/g Final   10/30/2024   Final     Comment:     Unable to calculate     Urine microalbuminuria collected on 5/14/2025 is negative for microalbuminuria    Total Cholesterol   Date Value Ref Range Status   05/14/2025 210 (H) 0 - 200 mg/dL Final   11/14/2024 235 (H) 0 - 200 mg/dL Final     Triglycerides   Date Value Ref Range Status   05/14/2025 328 (H) 0 - 150 mg/dL Final   11/14/2024 348 (H) 0 - 150 mg/dL Final     HDL Cholesterol   Date Value Ref Range Status   05/14/2025 36 (L) 40 - 60 mg/dL Final   11/14/2024 35 (L) 40 - 60 mg/dL Final     LDL Cholesterol    Date Value Ref Range Status   05/14/2025 117 (H) 0 - 100 mg/dL Final   11/14/2024 137 (H) 0 - 100 mg/dL Final     Lipid panel collected on 5/14/2025 shows Hypercholesterolemia, Hypertriglyceridemia, and low HDL              Diagnoses and all orders for this visit:    1. Type 2 diabetes mellitus with hyperglycemia, unspecified whether long term insulin use (Primary)  -     POC Glycosylated Hemoglobin (Hb A1C)    2. Uses self-applied continuous glucose monitoring device    3. Type 2 diabetes mellitus with hyperglycemia, with long-term current use of insulin    4. Severe obesity (BMI >= 40)    5. Type 2 diabetes mellitus with hemoglobin A1c goal of less than 7.0%    Other orders  -     POC Glucose          Assessment & Plan  1. Diabetes Mellitus.  - A1c has improved from 7.6 to 6.8, and blood glucose level today is 148, which is within the acceptable range for non-fasting.  - Harely report indicates an average blood glucose level of 127, with 99% of readings within the target range.  - She has lost 1 pound since her last visit.  - Continue current medication regimen: Mounjaro 15 mg once a week, Lantus 72 units twice a day, Humalog 20 units three times a day, and Jardiance 10 mg daily.    2. Anxiety.  - Currently taking Cymbalta for anxiety.  - Continue current  medication.  - Consider stress-reducing activities such as vacationing or engaging in hobbies.    3. Sleep issues.  - Takes trazodone as needed for sleep.  - Continue this regimen.  - Ensure adequate sleep to help manage cortisol levels.    4. Torn meniscus.  - Completed physical therapy and is no longer using crutches.  - No further treatment required at this time.    5. Elevated cortisol levels.  - Manage stress and ensure adequate sleep to help lower cortisol levels.  - Discussed ways to manage stress.  - Discussed managing stress and getting plenty of sleep will also help lower glucose levels.    Follow-up visit scheduled in 3 months.      The patient will monitor her blood glucose levels per continuous glucose monitor.  If she develops problematic hyperglycemia or hypoglycemia or adverse drug reactions, she will contact the office for further instructions.        Follow Up     Return in about 3 months (around 10/15/2025) for Medication Mgmt, CGM Follow Up.    Patient was given instructions and counseling regarding her condition or for health maintenance advice. Please see specific information pulled into the AVS if appropriate.     Marilee Laughlin, APRN  07/15/2025      Dictated Utilizing Dragon Dictation.  Please note that portions of this note were completed with a voice recognition program.  Part of this note may be an electronic transcription/translation of spoken language to printed text using the Dragon Dictation System.

## 2025-07-15 ENCOUNTER — OFFICE VISIT (OUTPATIENT)
Dept: DIABETES SERVICES | Facility: HOSPITAL | Age: 55
End: 2025-07-15
Payer: COMMERCIAL

## 2025-07-15 VITALS
DIASTOLIC BLOOD PRESSURE: 79 MMHG | HEART RATE: 87 BPM | SYSTOLIC BLOOD PRESSURE: 119 MMHG | WEIGHT: 219 LBS | OXYGEN SATURATION: 97 % | BODY MASS INDEX: 43 KG/M2 | HEIGHT: 60 IN

## 2025-07-15 DIAGNOSIS — E11.65 TYPE 2 DIABETES MELLITUS WITH HYPERGLYCEMIA, UNSPECIFIED WHETHER LONG TERM INSULIN USE: Primary | ICD-10-CM

## 2025-07-15 DIAGNOSIS — E11.9 TYPE 2 DIABETES MELLITUS WITH HEMOGLOBIN A1C GOAL OF LESS THAN 7.0%: ICD-10-CM

## 2025-07-15 DIAGNOSIS — Z97.8 USES SELF-APPLIED CONTINUOUS GLUCOSE MONITORING DEVICE: ICD-10-CM

## 2025-07-15 DIAGNOSIS — E66.01 SEVERE OBESITY (BMI >= 40): ICD-10-CM

## 2025-07-15 DIAGNOSIS — E11.65 TYPE 2 DIABETES MELLITUS WITH HYPERGLYCEMIA, WITH LONG-TERM CURRENT USE OF INSULIN: ICD-10-CM

## 2025-07-15 DIAGNOSIS — Z79.4 TYPE 2 DIABETES MELLITUS WITH HYPERGLYCEMIA, WITH LONG-TERM CURRENT USE OF INSULIN: ICD-10-CM

## 2025-07-15 LAB
EXPIRATION DATE: ABNORMAL
GLUCOSE BLDC GLUCOMTR-MCNC: 148 MG/DL (ref 70–99)
HBA1C MFR BLD: 6.8 % (ref 4.5–5.7)
Lab: ABNORMAL

## 2025-07-15 PROCEDURE — G0463 HOSPITAL OUTPT CLINIC VISIT: HCPCS | Performed by: NURSE PRACTITIONER

## 2025-07-15 PROCEDURE — 3074F SYST BP LT 130 MM HG: CPT | Performed by: NURSE PRACTITIONER

## 2025-07-15 PROCEDURE — 99214 OFFICE O/P EST MOD 30 MIN: CPT | Performed by: NURSE PRACTITIONER

## 2025-07-15 PROCEDURE — 3044F HG A1C LEVEL LT 7.0%: CPT | Performed by: NURSE PRACTITIONER

## 2025-07-15 PROCEDURE — 82948 REAGENT STRIP/BLOOD GLUCOSE: CPT | Performed by: NURSE PRACTITIONER

## 2025-07-15 PROCEDURE — 83036 HEMOGLOBIN GLYCOSYLATED A1C: CPT | Performed by: NURSE PRACTITIONER

## 2025-07-15 PROCEDURE — 3078F DIAST BP <80 MM HG: CPT | Performed by: NURSE PRACTITIONER

## 2025-07-15 PROCEDURE — 1159F MED LIST DOCD IN RCRD: CPT | Performed by: NURSE PRACTITIONER

## 2025-07-15 PROCEDURE — 1160F RVW MEDS BY RX/DR IN RCRD: CPT | Performed by: NURSE PRACTITIONER

## 2025-07-28 ENCOUNTER — TELEPHONE (OUTPATIENT)
Dept: FAMILY MEDICINE CLINIC | Facility: CLINIC | Age: 55
End: 2025-07-28
Payer: COMMERCIAL

## 2025-07-28 RX ORDER — AMLODIPINE BESYLATE 5 MG/1
5 TABLET ORAL DAILY
Qty: 30 TABLET | Refills: 4 | Status: SHIPPED | OUTPATIENT
Start: 2025-07-28

## 2025-07-28 NOTE — TELEPHONE ENCOUNTER
Caller: Carlota Robin    Relationship to patient: Self    Best call back number: [phone]    Patient is needing: Patient called in and is requesting a refill on Amlodipine 5 MG once daily. This dose is not in list. Patient is out of medication. Patient said it is okay to leave message on phone.    [facility] - [place], [place] - [address] - [phone]  - [phone]  [phone]

## 2025-08-04 DIAGNOSIS — N30.00 ACUTE CYSTITIS WITHOUT HEMATURIA: Primary | ICD-10-CM

## 2025-08-12 DIAGNOSIS — E11.65 TYPE 2 DIABETES MELLITUS WITH HYPERGLYCEMIA, UNSPECIFIED WHETHER LONG TERM INSULIN USE: ICD-10-CM

## 2025-08-12 RX ORDER — INSULIN LISPRO 100 [IU]/ML
20 INJECTION, SOLUTION INTRAVENOUS; SUBCUTANEOUS
Qty: 54 ML | Refills: 0 | Status: SHIPPED | OUTPATIENT
Start: 2025-08-12 | End: 2025-11-10

## 2025-08-13 ENCOUNTER — LAB (OUTPATIENT)
Dept: LAB | Facility: HOSPITAL | Age: 55
End: 2025-08-13
Payer: COMMERCIAL

## 2025-08-13 DIAGNOSIS — N30.00 ACUTE CYSTITIS WITHOUT HEMATURIA: ICD-10-CM

## 2025-08-13 PROCEDURE — 87086 URINE CULTURE/COLONY COUNT: CPT

## 2025-08-14 LAB — BACTERIA SPEC AEROBE CULT: NORMAL

## 2025-08-22 ENCOUNTER — PROCEDURE VISIT (OUTPATIENT)
Dept: UROLOGY | Age: 55
End: 2025-08-22
Payer: COMMERCIAL

## 2025-08-22 VITALS — WEIGHT: 219 LBS | BODY MASS INDEX: 43 KG/M2 | HEIGHT: 60 IN

## 2025-08-22 DIAGNOSIS — N32.81 OAB (OVERACTIVE BLADDER): Primary | ICD-10-CM

## 2025-08-22 LAB
BILIRUB BLD-MCNC: NEGATIVE MG/DL
CLARITY, POC: CLEAR
COLOR UR: YELLOW
EXPIRATION DATE: ABNORMAL
GLUCOSE UR STRIP-MCNC: ABNORMAL MG/DL
KETONES UR QL: NEGATIVE
LEUKOCYTE EST, POC: NEGATIVE
Lab: ABNORMAL
NITRITE UR-MCNC: NEGATIVE MG/ML
PH UR: 5.5 [PH] (ref 5–8)
PROT UR STRIP-MCNC: NEGATIVE MG/DL
RBC # UR STRIP: NEGATIVE /UL
SP GR UR: 1.02 (ref 1–1.03)
UROBILINOGEN UR QL: ABNORMAL

## 2025-08-28 ENCOUNTER — OFFICE VISIT (OUTPATIENT)
Dept: SURGERY | Facility: CLINIC | Age: 55
End: 2025-08-28
Payer: COMMERCIAL

## 2025-08-28 ENCOUNTER — PREP FOR SURGERY (OUTPATIENT)
Dept: OTHER | Facility: HOSPITAL | Age: 55
End: 2025-08-28
Payer: COMMERCIAL

## 2025-08-28 VITALS
SYSTOLIC BLOOD PRESSURE: 147 MMHG | WEIGHT: 214.29 LBS | DIASTOLIC BLOOD PRESSURE: 90 MMHG | HEART RATE: 84 BPM | BODY MASS INDEX: 42.07 KG/M2 | HEIGHT: 60 IN

## 2025-08-28 DIAGNOSIS — Z12.11 SCREENING FOR MALIGNANT NEOPLASM OF COLON: Primary | ICD-10-CM

## 2025-08-28 DIAGNOSIS — Z80.0 FAMILY HISTORY OF COLON CANCER: ICD-10-CM

## 2025-08-28 DIAGNOSIS — Z12.11 ENCOUNTER FOR SCREENING FOR MALIGNANT NEOPLASM OF COLON: Primary | ICD-10-CM

## 2025-08-28 RX ORDER — SODIUM CHLORIDE 0.9 % (FLUSH) 0.9 %
3 SYRINGE (ML) INJECTION EVERY 12 HOURS SCHEDULED
OUTPATIENT
Start: 2025-08-28

## 2025-08-28 RX ORDER — POLYETHYLENE GLYCOL 3350 17 G/17G
POWDER, FOR SOLUTION ORAL
Qty: 238 PACKET | Refills: 0 | Status: SHIPPED | OUTPATIENT
Start: 2025-08-28

## 2025-08-28 RX ORDER — SODIUM CHLORIDE 9 MG/ML
40 INJECTION, SOLUTION INTRAVENOUS AS NEEDED
OUTPATIENT
Start: 2025-08-28

## 2025-08-28 RX ORDER — SODIUM CHLORIDE 0.9 % (FLUSH) 0.9 %
10 SYRINGE (ML) INJECTION AS NEEDED
OUTPATIENT
Start: 2025-08-28

## (undated) DEVICE — PAD,ABDOMINAL,8"X10",ST,LF: Brand: MEDLINE

## (undated) DEVICE — ANTIBACTERIAL UNDYED BRAIDED (POLYGLACTIN 910), SYNTHETIC ABSORBABLE SUTURE: Brand: COATED VICRYL

## (undated) DEVICE — PENCL E/S SMOKEEVAC W/TELESCP CANN

## (undated) DEVICE — GLV SURG SENSICARE PI PF LF 7 GRN STRL

## (undated) DEVICE — INTENDED FOR TISSUE SEPARATION, AND OTHER PROCEDURES THAT REQUIRE A SHARP SURGICAL BLADE TO PUNCTURE OR CUT.: Brand: BARD-PARKER ® STAINLESS STEEL BLADES

## (undated) DEVICE — GLV SURG SENSICARE SLT PF LF 5.5 STRL

## (undated) DEVICE — GOWN,REINFORCE,POLY,SIRUS,BREATH SLV,XLG: Brand: MEDLINE

## (undated) DEVICE — PLASTIC MINOR PACK-LF: Brand: MEDLINE INDUSTRIES, INC.

## (undated) DEVICE — SLV SCD LEG COMFORT KENDALLSCD MD REPROC

## (undated) DEVICE — MARKR SKIN W/RULR AND LBL

## (undated) DEVICE — GLV SURG SENSICARE SLT PF LF 6.5 STRL

## (undated) DEVICE — CVR HNDL LT SURG ACCSSRY BLU STRL

## (undated) DEVICE — PROXIMATE RH ROTATING HEAD SKIN STAPLERS (35 WIDE) CONTAINS 35 STAINLESS STEEL STAPLES: Brand: PROXIMATE